# Patient Record
Sex: FEMALE | Race: WHITE | NOT HISPANIC OR LATINO | Employment: OTHER | URBAN - METROPOLITAN AREA
[De-identification: names, ages, dates, MRNs, and addresses within clinical notes are randomized per-mention and may not be internally consistent; named-entity substitution may affect disease eponyms.]

---

## 2017-02-14 ENCOUNTER — HOSPITAL ENCOUNTER (OUTPATIENT)
Dept: ORTHOPEDIC SURGERY | Facility: CLINIC | Age: 62
Discharge: HOME | End: 2017-02-14
Attending: PHYSICIAN ASSISTANT | Admitting: PHYSICIAN ASSISTANT

## 2017-06-07 ENCOUNTER — HOSPITAL ENCOUNTER (OUTPATIENT)
Dept: MRI IMAGING | Facility: HOSPITAL | Age: 62
Discharge: HOME | End: 2017-06-07
Attending: ORTHOPAEDIC SURGERY | Admitting: ORTHOPAEDIC SURGERY

## 2018-01-24 ENCOUNTER — HOSPITAL ENCOUNTER (OUTPATIENT)
Dept: CARDIOLOGY | Facility: HOSPITAL | Age: 63
Discharge: HOME | End: 2018-01-24
Attending: NURSE PRACTITIONER | Admitting: NURSE PRACTITIONER

## 2018-02-09 ENCOUNTER — HOSPITAL ENCOUNTER (OUTPATIENT)
Dept: ULTRASOUND IMAGING | Facility: HOSPITAL | Age: 63
Discharge: HOME | End: 2018-02-09
Attending: NURSE PRACTITIONER | Admitting: NURSE PRACTITIONER

## 2018-04-12 ENCOUNTER — OFFICE (AMBULATORY)
Dept: URBAN - METROPOLITAN AREA CLINIC 64 | Facility: CLINIC | Age: 63
End: 2018-04-12

## 2018-04-12 VITALS
HEIGHT: 60 IN | SYSTOLIC BLOOD PRESSURE: 108 MMHG | HEART RATE: 88 BPM | WEIGHT: 165 LBS | DIASTOLIC BLOOD PRESSURE: 67 MMHG

## 2018-04-12 DIAGNOSIS — R19.7 DIARRHEA, UNSPECIFIED: ICD-10-CM

## 2018-04-12 DIAGNOSIS — K21.9 GASTRO-ESOPHAGEAL REFLUX DISEASE WITHOUT ESOPHAGITIS: ICD-10-CM

## 2018-04-12 DIAGNOSIS — R13.10 DYSPHAGIA, UNSPECIFIED: ICD-10-CM

## 2018-04-12 PROCEDURE — 99203 OFFICE O/P NEW LOW 30 MIN: CPT | Performed by: NURSE PRACTITIONER

## 2018-04-12 RX ORDER — COLESTIPOL HYDROCHLORIDE 1 G/1
2 TABLET, FILM COATED ORAL
Qty: 60 | Refills: 11 | Status: COMPLETED
Start: 2018-04-12 | End: 2018-08-22

## 2018-04-26 ENCOUNTER — ON CAMPUS - OUTPATIENT (AMBULATORY)
Dept: URBAN - METROPOLITAN AREA HOSPITAL 85 | Facility: HOSPITAL | Age: 63
End: 2018-04-26

## 2018-04-26 ENCOUNTER — HOSPITAL ENCOUNTER (OUTPATIENT)
Dept: PREOP | Facility: HOSPITAL | Age: 63
Setting detail: HOSPITAL OUTPATIENT SURGERY
Discharge: HOME | End: 2018-04-26
Attending: INTERNAL MEDICINE | Admitting: INTERNAL MEDICINE

## 2018-04-26 DIAGNOSIS — K22.2 ESOPHAGEAL OBSTRUCTION: ICD-10-CM

## 2018-04-26 DIAGNOSIS — R13.10 DYSPHAGIA, UNSPECIFIED: ICD-10-CM

## 2018-04-26 DIAGNOSIS — K21.9 GASTRO-ESOPHAGEAL REFLUX DISEASE WITHOUT ESOPHAGITIS: ICD-10-CM

## 2018-04-26 LAB — GLUCOSE BLD-MCNC: 122 MG/DL (ref 70–105)

## 2018-04-26 PROCEDURE — 43450 DILATE ESOPHAGUS 1/MULT PASS: CPT | Performed by: INTERNAL MEDICINE

## 2018-04-26 PROCEDURE — 43239 EGD BIOPSY SINGLE/MULTIPLE: CPT | Performed by: INTERNAL MEDICINE

## 2018-08-22 ENCOUNTER — OFFICE (AMBULATORY)
Dept: URBAN - METROPOLITAN AREA CLINIC 64 | Facility: CLINIC | Age: 63
End: 2018-08-22

## 2018-08-22 VITALS
WEIGHT: 165 LBS | HEART RATE: 71 BPM | SYSTOLIC BLOOD PRESSURE: 115 MMHG | DIASTOLIC BLOOD PRESSURE: 72 MMHG | HEIGHT: 60 IN

## 2018-08-22 DIAGNOSIS — R10.32 LEFT LOWER QUADRANT PAIN: ICD-10-CM

## 2018-08-22 DIAGNOSIS — R19.7 DIARRHEA, UNSPECIFIED: ICD-10-CM

## 2018-08-22 PROCEDURE — 99213 OFFICE O/P EST LOW 20 MIN: CPT | Performed by: NURSE PRACTITIONER

## 2018-08-22 RX ORDER — DICYCLOMINE HYDROCHLORIDE 10 MG/1
40 CAPSULE ORAL
Qty: 60 | Refills: 11 | Status: COMPLETED
Start: 2018-08-22 | End: 2020-07-10

## 2018-08-27 ENCOUNTER — HOSPITAL ENCOUNTER (OUTPATIENT)
Dept: CT IMAGING | Facility: HOSPITAL | Age: 63
Discharge: HOME | End: 2018-08-27
Attending: NURSE PRACTITIONER | Admitting: NURSE PRACTITIONER

## 2018-08-27 LAB — CREAT BLDA-MCNC: 1.2 MG/DL (ref 0.6–1.3)

## 2019-02-21 ENCOUNTER — HOSPITAL ENCOUNTER (OUTPATIENT)
Dept: PHYSICAL THERAPY | Facility: HOSPITAL | Age: 64
Setting detail: RECURRING SERIES
Discharge: HOME | End: 2019-05-24
Attending: NURSE PRACTITIONER | Admitting: NURSE PRACTITIONER

## 2019-02-27 ENCOUNTER — HOSPITAL ENCOUNTER (OUTPATIENT)
Dept: ORTHOPEDIC SURGERY | Facility: CLINIC | Age: 64
Discharge: HOME | End: 2019-02-27
Attending: ORTHOPAEDIC SURGERY | Admitting: ORTHOPAEDIC SURGERY

## 2019-03-04 ENCOUNTER — HOSPITAL ENCOUNTER (OUTPATIENT)
Dept: ORTHOPEDIC SURGERY | Facility: CLINIC | Age: 64
Discharge: HOME | End: 2019-03-04
Attending: PHYSICIAN ASSISTANT | Admitting: PHYSICIAN ASSISTANT

## 2019-04-04 ENCOUNTER — HOSPITAL ENCOUNTER (OUTPATIENT)
Dept: OTHER | Facility: HOSPITAL | Age: 64
Discharge: HOME | End: 2019-04-04
Attending: NEUROLOGICAL SURGERY | Admitting: NEUROLOGICAL SURGERY

## 2019-09-19 ENCOUNTER — OFFICE VISIT (OUTPATIENT)
Dept: NEUROSURGERY | Facility: CLINIC | Age: 64
End: 2019-09-19

## 2019-09-19 VITALS
HEART RATE: 71 BPM | OXYGEN SATURATION: 99 % | DIASTOLIC BLOOD PRESSURE: 77 MMHG | SYSTOLIC BLOOD PRESSURE: 121 MMHG | HEIGHT: 60 IN | WEIGHT: 148 LBS | BODY MASS INDEX: 29.06 KG/M2

## 2019-09-19 DIAGNOSIS — M47.22 CERVICAL SPONDYLOSIS WITH MYELOPATHY AND RADICULOPATHY: Primary | ICD-10-CM

## 2019-09-19 DIAGNOSIS — M47.12 CERVICAL SPONDYLOSIS WITH MYELOPATHY AND RADICULOPATHY: Primary | ICD-10-CM

## 2019-09-19 PROCEDURE — 99213 OFFICE O/P EST LOW 20 MIN: CPT | Performed by: NEUROLOGICAL SURGERY

## 2019-09-19 RX ORDER — GABAPENTIN 600 MG/1
600 TABLET ORAL 2 TIMES DAILY
Refills: 0 | COMMUNITY
Start: 2019-09-09 | End: 2022-12-29 | Stop reason: SDUPTHER

## 2019-09-19 RX ORDER — METOPROLOL SUCCINATE 100 MG/1
100 TABLET, EXTENDED RELEASE ORAL 2 TIMES DAILY
COMMUNITY
Start: 2015-10-02 | End: 2023-03-24

## 2019-09-19 RX ORDER — HYDROXYZINE 50 MG/1
TABLET, FILM COATED ORAL
COMMUNITY
Start: 2019-02-27 | End: 2020-08-11

## 2019-09-19 RX ORDER — FENOFIBRATE 160 MG/1
160 TABLET ORAL EVERY EVENING
COMMUNITY
End: 2022-10-18 | Stop reason: SDDI

## 2019-09-19 RX ORDER — GLIPIZIDE 5 MG/1
5 TABLET ORAL
COMMUNITY
Start: 2015-09-30 | End: 2022-10-03

## 2019-09-19 RX ORDER — CLONIDINE HYDROCHLORIDE 0.2 MG/1
0.2 TABLET ORAL NIGHTLY
COMMUNITY
Start: 2015-10-02 | End: 2022-09-22

## 2019-09-19 RX ORDER — HYDRALAZINE HYDROCHLORIDE 50 MG/1
50 TABLET, FILM COATED ORAL 3 TIMES DAILY
COMMUNITY
Start: 2015-10-02 | End: 2022-09-22

## 2019-09-19 RX ORDER — ICOSAPENT ETHYL 1000 MG/1
1 CAPSULE ORAL DAILY
COMMUNITY
Start: 2016-09-30 | End: 2022-10-18 | Stop reason: SDDI

## 2019-09-19 RX ORDER — PRAVASTATIN SODIUM 20 MG
20 TABLET ORAL NIGHTLY
COMMUNITY
Start: 2019-02-27 | End: 2021-08-11

## 2019-09-19 RX ORDER — FUROSEMIDE 40 MG/1
40 TABLET ORAL EVERY MORNING
COMMUNITY
Start: 2015-09-30

## 2019-09-19 RX ORDER — DULOXETIN HYDROCHLORIDE 30 MG/1
30 CAPSULE, DELAYED RELEASE ORAL EVERY MORNING
COMMUNITY
End: 2020-10-15

## 2019-09-19 RX ORDER — SPIRONOLACTONE 50 MG/1
50 TABLET, FILM COATED ORAL EVERY MORNING
COMMUNITY
Start: 2015-09-30 | End: 2022-09-22

## 2019-09-19 NOTE — PROGRESS NOTES
"Subjective   Odalis Solo is a 64 y.o. female.     Chief Complaint   Patient presents with   • Neck Pain     follow up after Pain Management injections and ablation, continues to have radicular pain and paresthesias to the left upper extremity     Visit Vitals  /77 (BP Location: Left arm, Patient Position: Sitting, Cuff Size: Adult)   Pulse 71   Ht 152.4 cm (60\")   Wt 67.1 kg (148 lb)   SpO2 99%   BMI 28.90 kg/m²       History of Present Illness: Mrs. Solo is here today for follow-up after undergoing a series of epidural injections.  She states that the injections might of helped her neck pain for just a few days.  She is still experiencing worsening left arm pain and now right arm pain.  She has numbness in the hands feels like she is dropping things more readily.  She states her arms are feeling more fatigued when she tries to use them too much.  She denies any gait dysfunction or urinary urgency or incontinence.  She was recently prescribed Cymbalta but she has not started taking this yet.    The following portions of the patient's history were reviewed and updated as appropriate: allergies, current medications, past family history, past medical history, past social history, past surgical history and problem list.    Review of Systems      Past Surgical History:   Procedure Laterality Date   • APPENDECTOMY     • BRAIN SURGERY      x7   • CARPAL TUNNEL RELEASE     •  SECTION     • CHOLECYSTECTOMY OPEN     • HIP SURGERY     • SHOULDER ARTHROSCOPY         Past Medical History:   Diagnosis Date   • Diabetes mellitus (CMS/[facility])    • Hyperlipidemia    • Hypertension    • Low back pain    • TIA (transient ischemic attack)        Objective   Physical Exam  Neurologic Exam  Ortho Exam    Bilateral weakness deltoid biceps and hand intrinsics of 4/5  Positive Spurling sign  Positive intermittent  Decreased sensation in a patchy distribution along the C5-6 distribution  No overt upper motor neuron " signs    Assessment and Plan: It is my feeling that Mrs. 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  I reviewed again the MRI of the cervical flexion-extension films.  She has slight motion at the C4-5 level on flexion-extension.  She has relatively severe bilateral foraminal stenosis at C4-5 and central canal stenosis of approximately 7 mm.  This is also prevalent at the C5-6.  This does correlate with her radicular signs and my examination last time.  At this time I feel she is failed conservative measures and with her complaints progressive weakness in the hands I feel it more prudent to proceed ahead with surgical intervention.  I proposed her today C4-6 intracervical discectomy and arthrodesis.  I did discuss with her all the risk being bleeding, death, paralysis, infection, CSF leak, stroke, they were the procedure need for the procedures, injury to the current laryngeal nerve or to the esophagus.  She indicates she understands wished to proceed.  She watch the video of the procedure today and I answered all the questions to the best of my ability and her satisfaction.      Problems Addressed this Visit     None

## 2019-09-23 ENCOUNTER — PREP FOR SURGERY (OUTPATIENT)
Dept: OTHER | Facility: HOSPITAL | Age: 64
End: 2019-09-23

## 2019-09-23 DIAGNOSIS — M47.22 CERVICAL SPONDYLOSIS WITH MYELOPATHY AND RADICULOPATHY: Primary | ICD-10-CM

## 2019-09-23 DIAGNOSIS — M47.12 CERVICAL SPONDYLOSIS WITH MYELOPATHY AND RADICULOPATHY: Primary | ICD-10-CM

## 2019-09-23 DIAGNOSIS — Z51.89 ENCOUNTER FOR OTHER SPECIFIED AFTERCARE: ICD-10-CM

## 2019-09-23 RX ORDER — CEFAZOLIN SODIUM IN 0.9 % NACL 3 G/100 ML
3 INTRAVENOUS SOLUTION, PIGGYBACK (ML) INTRAVENOUS ONCE
Status: CANCELLED | OUTPATIENT
Start: 2019-09-23 | End: 2019-09-23

## 2019-09-23 RX ORDER — SODIUM CHLORIDE 0.9 % (FLUSH) 0.9 %
3 SYRINGE (ML) INJECTION EVERY 12 HOURS SCHEDULED
Status: CANCELLED | OUTPATIENT
Start: 2019-09-23

## 2019-09-23 RX ORDER — SODIUM CHLORIDE 0.9 % (FLUSH) 0.9 %
3-10 SYRINGE (ML) INJECTION AS NEEDED
Status: CANCELLED | OUTPATIENT
Start: 2019-09-23

## 2019-11-12 ENCOUNTER — HOSPITAL ENCOUNTER (OUTPATIENT)
Dept: GENERAL RADIOLOGY | Facility: HOSPITAL | Age: 64
Discharge: HOME | End: 2019-11-12
Admitting: PHYSICIAN ASSISTANT

## 2019-11-12 ENCOUNTER — APPOINTMENT (OUTPATIENT)
Dept: PREADMISSION TESTING | Facility: HOSPITAL | Age: 64
End: 2019-11-12

## 2019-11-12 VITALS
HEART RATE: 66 BPM | RESPIRATION RATE: 14 BRPM | DIASTOLIC BLOOD PRESSURE: 73 MMHG | WEIGHT: 149.4 LBS | SYSTOLIC BLOOD PRESSURE: 107 MMHG | OXYGEN SATURATION: 96 % | BODY MASS INDEX: 29.33 KG/M2 | HEIGHT: 60 IN | TEMPERATURE: 98.2 F

## 2019-11-12 DIAGNOSIS — M47.22 CERVICAL SPONDYLOSIS WITH MYELOPATHY AND RADICULOPATHY: ICD-10-CM

## 2019-11-12 DIAGNOSIS — Z51.89 ENCOUNTER FOR OTHER SPECIFIED AFTERCARE: ICD-10-CM

## 2019-11-12 DIAGNOSIS — M47.12 CERVICAL SPONDYLOSIS WITH MYELOPATHY AND RADICULOPATHY: ICD-10-CM

## 2019-11-12 LAB
ABO GROUP BLD: NORMAL
ALBUMIN SERPL-MCNC: 4.7 G/DL (ref 3.5–5.2)
ALBUMIN/GLOB SERPL: 1.7 G/DL
ALP SERPL-CCNC: 63 U/L (ref 39–117)
ALT SERPL W P-5'-P-CCNC: 17 U/L (ref 1–33)
ANION GAP SERPL CALCULATED.3IONS-SCNC: 12 MMOL/L (ref 5–15)
APTT PPP: 24.9 SECONDS (ref 24–31)
AST SERPL-CCNC: 17 U/L (ref 1–32)
BASOPHILS # BLD AUTO: 0 10*3/MM3 (ref 0–0.2)
BASOPHILS NFR BLD AUTO: 0.7 % (ref 0–1.5)
BILIRUB SERPL-MCNC: 0.3 MG/DL (ref 0.2–1.2)
BILIRUB UR QL STRIP: NEGATIVE
BLD GP AB SCN SERPL QL: NEGATIVE
BUN BLD-MCNC: 22 MG/DL (ref 8–23)
BUN/CREAT SERPL: 17.7 (ref 7–25)
CALCIUM SPEC-SCNC: 10.2 MG/DL (ref 8.6–10.5)
CHLORIDE SERPL-SCNC: 101 MMOL/L (ref 98–107)
CLARITY UR: CLEAR
CO2 SERPL-SCNC: 27 MMOL/L (ref 22–29)
COLOR UR: YELLOW
CREAT BLD-MCNC: 1.24 MG/DL (ref 0.57–1)
DEPRECATED RDW RBC AUTO: 42.4 FL (ref 37–54)
EOSINOPHIL # BLD AUTO: 0.1 10*3/MM3 (ref 0–0.4)
EOSINOPHIL NFR BLD AUTO: 1.6 % (ref 0.3–6.2)
ERYTHROCYTE [DISTWIDTH] IN BLOOD BY AUTOMATED COUNT: 13.3 % (ref 12.3–15.4)
GFR SERPL CREATININE-BSD FRML MDRD: 44 ML/MIN/1.73
GLOBULIN UR ELPH-MCNC: 2.7 GM/DL
GLUCOSE BLD-MCNC: 115 MG/DL (ref 65–99)
GLUCOSE UR STRIP-MCNC: NEGATIVE MG/DL
HCT VFR BLD AUTO: 37 % (ref 34–46.6)
HGB BLD-MCNC: 12.4 G/DL (ref 12–15.9)
HGB UR QL STRIP.AUTO: NEGATIVE
INR PPP: 0.97 (ref 0.9–1.1)
KETONES UR QL STRIP: NEGATIVE
LEUKOCYTE ESTERASE UR QL STRIP.AUTO: NEGATIVE
LYMPHOCYTES # BLD AUTO: 1.5 10*3/MM3 (ref 0.7–3.1)
LYMPHOCYTES NFR BLD AUTO: 25.5 % (ref 19.6–45.3)
MCH RBC QN AUTO: 30.3 PG (ref 26.6–33)
MCHC RBC AUTO-ENTMCNC: 33.6 G/DL (ref 31.5–35.7)
MCV RBC AUTO: 90.2 FL (ref 79–97)
MONOCYTES # BLD AUTO: 0.5 10*3/MM3 (ref 0.1–0.9)
MONOCYTES NFR BLD AUTO: 9.1 % (ref 5–12)
NEUTROPHILS # BLD AUTO: 3.7 10*3/MM3 (ref 1.7–7)
NEUTROPHILS NFR BLD AUTO: 63.1 % (ref 42.7–76)
NITRITE UR QL STRIP: NEGATIVE
NRBC BLD AUTO-RTO: 0.1 /100 WBC (ref 0–0.2)
PH UR STRIP.AUTO: 6 [PH] (ref 5–8)
PLATELET # BLD AUTO: 280 10*3/MM3 (ref 140–450)
PMV BLD AUTO: 9.2 FL (ref 6–12)
POTASSIUM BLD-SCNC: 4.3 MMOL/L (ref 3.5–5.2)
PROT SERPL-MCNC: 7.4 G/DL (ref 6–8.5)
PROT UR QL STRIP: NEGATIVE
PROTHROMBIN TIME: 10.2 SECONDS (ref 9.6–11.7)
RBC # BLD AUTO: 4.1 10*6/MM3 (ref 3.77–5.28)
RH BLD: POSITIVE
SODIUM BLD-SCNC: 140 MMOL/L (ref 136–145)
SP GR UR STRIP: 1.01 (ref 1–1.03)
T&S EXPIRATION DATE: NORMAL
UROBILINOGEN UR QL STRIP: NORMAL
WBC NRBC COR # BLD: 5.9 10*3/MM3 (ref 3.4–10.8)

## 2019-11-12 PROCEDURE — 93010 ELECTROCARDIOGRAM REPORT: CPT | Performed by: INTERNAL MEDICINE

## 2019-11-12 PROCEDURE — 85610 PROTHROMBIN TIME: CPT | Performed by: PHYSICIAN ASSISTANT

## 2019-11-12 PROCEDURE — 85730 THROMBOPLASTIN TIME PARTIAL: CPT | Performed by: PHYSICIAN ASSISTANT

## 2019-11-12 PROCEDURE — 86900 BLOOD TYPING SEROLOGIC ABO: CPT | Performed by: PHYSICIAN ASSISTANT

## 2019-11-12 PROCEDURE — 85025 COMPLETE CBC W/AUTO DIFF WBC: CPT | Performed by: PHYSICIAN ASSISTANT

## 2019-11-12 PROCEDURE — 86901 BLOOD TYPING SEROLOGIC RH(D): CPT | Performed by: PHYSICIAN ASSISTANT

## 2019-11-12 PROCEDURE — 80053 COMPREHEN METABOLIC PANEL: CPT | Performed by: PHYSICIAN ASSISTANT

## 2019-11-12 PROCEDURE — 93005 ELECTROCARDIOGRAM TRACING: CPT

## 2019-11-12 PROCEDURE — 86850 RBC ANTIBODY SCREEN: CPT | Performed by: PHYSICIAN ASSISTANT

## 2019-11-12 PROCEDURE — 36415 COLL VENOUS BLD VENIPUNCTURE: CPT

## 2019-11-12 PROCEDURE — 86900 BLOOD TYPING SEROLOGIC ABO: CPT

## 2019-11-12 PROCEDURE — 86901 BLOOD TYPING SEROLOGIC RH(D): CPT

## 2019-11-12 PROCEDURE — 71046 X-RAY EXAM CHEST 2 VIEWS: CPT

## 2019-11-12 PROCEDURE — 87081 CULTURE SCREEN ONLY: CPT | Performed by: PHYSICIAN ASSISTANT

## 2019-11-12 PROCEDURE — 81003 URINALYSIS AUTO W/O SCOPE: CPT | Performed by: PHYSICIAN ASSISTANT

## 2019-11-13 DIAGNOSIS — Z22.322 MRSA CARRIER: Primary | ICD-10-CM

## 2019-11-13 LAB — MRSA SPEC QL CULT: NORMAL

## 2019-11-13 RX ORDER — CHLORHEXIDINE GLUCONATE 4 G/100ML
SOLUTION TOPICAL
Qty: 118 ML | Refills: 0 | Status: SHIPPED | OUTPATIENT
Start: 2019-11-13 | End: 2019-12-05

## 2019-11-18 ENCOUNTER — ANESTHESIA EVENT (OUTPATIENT)
Dept: PERIOP | Facility: HOSPITAL | Age: 64
End: 2019-11-18

## 2019-11-19 ENCOUNTER — APPOINTMENT (OUTPATIENT)
Dept: GENERAL RADIOLOGY | Facility: HOSPITAL | Age: 64
End: 2019-11-19

## 2019-11-19 ENCOUNTER — HOSPITAL ENCOUNTER (INPATIENT)
Facility: HOSPITAL | Age: 64
LOS: 1 days | Discharge: HOME | End: 2019-11-20
Attending: NEUROLOGICAL SURGERY | Admitting: NEUROLOGICAL SURGERY

## 2019-11-19 ENCOUNTER — ANESTHESIA (OUTPATIENT)
Dept: PERIOP | Facility: HOSPITAL | Age: 64
End: 2019-11-19

## 2019-11-19 DIAGNOSIS — M47.22 CERVICAL SPONDYLOSIS WITH MYELOPATHY AND RADICULOPATHY: ICD-10-CM

## 2019-11-19 DIAGNOSIS — M47.12 CERVICAL SPONDYLOSIS WITH MYELOPATHY AND RADICULOPATHY: ICD-10-CM

## 2019-11-19 DIAGNOSIS — Z22.322 MRSA CARRIER: ICD-10-CM

## 2019-11-19 LAB
GLUCOSE BLDC GLUCOMTR-MCNC: 120 MG/DL (ref 70–105)
GLUCOSE BLDC GLUCOMTR-MCNC: 148 MG/DL (ref 70–105)
GLUCOSE BLDC GLUCOMTR-MCNC: 94 MG/DL (ref 70–105)

## 2019-11-19 PROCEDURE — 4A11X4G MONITORING OF PERIPHERAL NERVOUS ELECTRICAL ACTIVITY, INTRAOPERATIVE, EXTERNAL APPROACH: ICD-10-PCS | Performed by: NEUROLOGICAL SURGERY

## 2019-11-19 PROCEDURE — 00NW0ZZ RELEASE CERVICAL SPINAL CORD, OPEN APPROACH: ICD-10-PCS | Performed by: NEUROLOGICAL SURGERY

## 2019-11-19 PROCEDURE — 22845 INSERT SPINE FIXATION DEVICE: CPT | Performed by: NEUROLOGICAL SURGERY

## 2019-11-19 PROCEDURE — 22552 ARTHRD ANT NTRBD CERVICAL EA: CPT | Performed by: NEUROLOGICAL SURGERY

## 2019-11-19 PROCEDURE — 25010000002 FENTANYL CITRATE (PF) 100 MCG/2ML SOLUTION: Performed by: ANESTHESIOLOGIST ASSISTANT

## 2019-11-19 PROCEDURE — 25010000002 DEXAMETHASONE PER 1 MG: Performed by: ANESTHESIOLOGIST ASSISTANT

## 2019-11-19 PROCEDURE — 25010000002 CEFAZOLIN PER 500 MG: Performed by: NEUROLOGICAL SURGERY

## 2019-11-19 PROCEDURE — 25010000002 MIDAZOLAM PER 1 MG: Performed by: ANESTHESIOLOGIST ASSISTANT

## 2019-11-19 PROCEDURE — 25010000002 PHENYLEPHRINE PER 1 ML: Performed by: ANESTHESIOLOGIST ASSISTANT

## 2019-11-19 PROCEDURE — 25010000002 ONDANSETRON PER 1 MG: Performed by: ANESTHESIOLOGIST ASSISTANT

## 2019-11-19 PROCEDURE — 72020 X-RAY EXAM OF SPINE 1 VIEW: CPT

## 2019-11-19 PROCEDURE — 25010000002 PROPOFOL 200 MG/20ML EMULSION: Performed by: ANESTHESIOLOGIST ASSISTANT

## 2019-11-19 PROCEDURE — C1713 ANCHOR/SCREW BN/BN,TIS/BN: HCPCS | Performed by: NEUROLOGICAL SURGERY

## 2019-11-19 PROCEDURE — 0RB30ZZ EXCISION OF CERVICAL VERTEBRAL DISC, OPEN APPROACH: ICD-10-PCS | Performed by: NEUROLOGICAL SURGERY

## 2019-11-19 PROCEDURE — 82962 GLUCOSE BLOOD TEST: CPT

## 2019-11-19 PROCEDURE — 25010000002 MORPHINE PER 10 MG: Performed by: ANESTHESIOLOGIST ASSISTANT

## 2019-11-19 PROCEDURE — 25010000002 KETOROLAC TROMETHAMINE PER 15 MG: Performed by: ANESTHESIOLOGIST ASSISTANT

## 2019-11-19 PROCEDURE — 22853 INSJ BIOMECHANICAL DEVICE: CPT | Performed by: NEUROLOGICAL SURGERY

## 2019-11-19 PROCEDURE — 01N10ZZ RELEASE CERVICAL NERVE, OPEN APPROACH: ICD-10-PCS | Performed by: NEUROLOGICAL SURGERY

## 2019-11-19 PROCEDURE — 22551 ARTHRD ANT NTRBDY CERVICAL: CPT | Performed by: NEUROLOGICAL SURGERY

## 2019-11-19 PROCEDURE — 0RG20A0 FUSION OF 2 OR MORE CERVICAL VERTEBRAL JOINTS WITH INTERBODY FUSION DEVICE, ANTERIOR APPROACH, ANTERIOR COLUMN, OPEN APPROACH: ICD-10-PCS | Performed by: NEUROLOGICAL SURGERY

## 2019-11-19 DEVICE — IMPLANTABLE DEVICE: Type: IMPLANTABLE DEVICE | Site: SPINE CERVICAL | Status: FUNCTIONAL

## 2019-11-19 DEVICE — ALLOGRFT SPNG OSTEOAMP COMPRESSIBLE SM TALL 10X10X16MM: Type: IMPLANTABLE DEVICE | Site: SPINE CERVICAL | Status: FUNCTIONAL

## 2019-11-19 RX ORDER — HYDROCODONE BITARTRATE AND ACETAMINOPHEN 7.5; 325 MG/1; MG/1
1 TABLET ORAL EVERY 4 HOURS PRN
Status: DISCONTINUED | OUTPATIENT
Start: 2019-11-19 | End: 2019-11-20 | Stop reason: HOSPADM

## 2019-11-19 RX ORDER — HYDROXYZINE HYDROCHLORIDE 25 MG/1
50 TABLET, FILM COATED ORAL NIGHTLY PRN
Status: DISCONTINUED | OUTPATIENT
Start: 2019-11-19 | End: 2019-11-20 | Stop reason: HOSPADM

## 2019-11-19 RX ORDER — HYDRALAZINE HYDROCHLORIDE 25 MG/1
50 TABLET, FILM COATED ORAL EVERY 8 HOURS SCHEDULED
Status: DISCONTINUED | OUTPATIENT
Start: 2019-11-19 | End: 2019-11-20 | Stop reason: HOSPADM

## 2019-11-19 RX ORDER — GABAPENTIN 300 MG/1
600 CAPSULE ORAL EVERY 8 HOURS SCHEDULED
Status: DISCONTINUED | OUTPATIENT
Start: 2019-11-19 | End: 2019-11-20 | Stop reason: HOSPADM

## 2019-11-19 RX ORDER — DEXAMETHASONE SODIUM PHOSPHATE 4 MG/ML
INJECTION, SOLUTION INTRA-ARTICULAR; INTRALESIONAL; INTRAMUSCULAR; INTRAVENOUS; SOFT TISSUE AS NEEDED
Status: DISCONTINUED | OUTPATIENT
Start: 2019-11-19 | End: 2019-11-19 | Stop reason: SURG

## 2019-11-19 RX ORDER — LIDOCAINE HYDROCHLORIDE 10 MG/ML
INJECTION, SOLUTION EPIDURAL; INFILTRATION; INTRACAUDAL; PERINEURAL AS NEEDED
Status: DISCONTINUED | OUTPATIENT
Start: 2019-11-19 | End: 2019-11-19 | Stop reason: SURG

## 2019-11-19 RX ORDER — DOCUSATE SODIUM 100 MG/1
100 CAPSULE, LIQUID FILLED ORAL 2 TIMES DAILY PRN
Status: DISCONTINUED | OUTPATIENT
Start: 2019-11-19 | End: 2019-11-20 | Stop reason: HOSPADM

## 2019-11-19 RX ORDER — PROMETHAZINE HYDROCHLORIDE 25 MG/1
25 TABLET ORAL ONCE AS NEEDED
Status: DISCONTINUED | OUTPATIENT
Start: 2019-11-19 | End: 2019-11-19 | Stop reason: HOSPADM

## 2019-11-19 RX ORDER — SODIUM CHLORIDE 0.9 % (FLUSH) 0.9 %
3 SYRINGE (ML) INJECTION EVERY 12 HOURS SCHEDULED
Status: DISCONTINUED | OUTPATIENT
Start: 2019-11-19 | End: 2019-11-19 | Stop reason: HOSPADM

## 2019-11-19 RX ORDER — MORPHINE SULFATE 4 MG/ML
4 INJECTION, SOLUTION INTRAMUSCULAR; INTRAVENOUS
Status: DISCONTINUED | OUTPATIENT
Start: 2019-11-19 | End: 2019-11-19 | Stop reason: HOSPADM

## 2019-11-19 RX ORDER — MORPHINE SULFATE 4 MG/ML
1 INJECTION, SOLUTION INTRAMUSCULAR; INTRAVENOUS
Status: DISCONTINUED | OUTPATIENT
Start: 2019-11-19 | End: 2019-11-19 | Stop reason: HOSPADM

## 2019-11-19 RX ORDER — PROMETHAZINE HYDROCHLORIDE 25 MG/ML
6.25 INJECTION, SOLUTION INTRAMUSCULAR; INTRAVENOUS ONCE AS NEEDED
Status: DISCONTINUED | OUTPATIENT
Start: 2019-11-19 | End: 2019-11-19 | Stop reason: HOSPADM

## 2019-11-19 RX ORDER — SODIUM CHLORIDE 0.9 % (FLUSH) 0.9 %
3 SYRINGE (ML) INJECTION EVERY 12 HOURS SCHEDULED
Status: DISCONTINUED | OUTPATIENT
Start: 2019-11-19 | End: 2019-11-20 | Stop reason: HOSPADM

## 2019-11-19 RX ORDER — DIPHENHYDRAMINE HCL 25 MG
25 TABLET ORAL NIGHTLY PRN
Status: DISCONTINUED | OUTPATIENT
Start: 2019-11-19 | End: 2019-11-20 | Stop reason: HOSPADM

## 2019-11-19 RX ORDER — METOPROLOL SUCCINATE 50 MG/1
100 TABLET, EXTENDED RELEASE ORAL
Status: DISCONTINUED | OUTPATIENT
Start: 2019-11-20 | End: 2019-11-20 | Stop reason: HOSPADM

## 2019-11-19 RX ORDER — CEFAZOLIN SODIUM IN 0.9 % NACL 3 G/100 ML
3 INTRAVENOUS SOLUTION, PIGGYBACK (ML) INTRAVENOUS ONCE
Status: DISCONTINUED | OUTPATIENT
Start: 2019-11-19 | End: 2019-11-19

## 2019-11-19 RX ORDER — ONDANSETRON 2 MG/ML
4 INJECTION INTRAMUSCULAR; INTRAVENOUS ONCE AS NEEDED
Status: DISCONTINUED | OUTPATIENT
Start: 2019-11-19 | End: 2019-11-19 | Stop reason: HOSPADM

## 2019-11-19 RX ORDER — GLIPIZIDE 5 MG/1
5 TABLET ORAL
Status: DISCONTINUED | OUTPATIENT
Start: 2019-11-19 | End: 2019-11-20 | Stop reason: HOSPADM

## 2019-11-19 RX ORDER — ACETAMINOPHEN 325 MG/1
650 TABLET ORAL EVERY 4 HOURS PRN
Status: DISCONTINUED | OUTPATIENT
Start: 2019-11-19 | End: 2019-11-20 | Stop reason: HOSPADM

## 2019-11-19 RX ORDER — FENTANYL CITRATE 50 UG/ML
50 INJECTION, SOLUTION INTRAMUSCULAR; INTRAVENOUS
Status: DISCONTINUED | OUTPATIENT
Start: 2019-11-19 | End: 2019-11-19 | Stop reason: HOSPADM

## 2019-11-19 RX ORDER — BISACODYL 5 MG/1
5 TABLET, DELAYED RELEASE ORAL DAILY PRN
Status: DISCONTINUED | OUTPATIENT
Start: 2019-11-19 | End: 2019-11-20 | Stop reason: HOSPADM

## 2019-11-19 RX ORDER — NALOXONE HCL 0.4 MG/ML
0.4 VIAL (ML) INJECTION AS NEEDED
Status: DISCONTINUED | OUTPATIENT
Start: 2019-11-19 | End: 2019-11-19 | Stop reason: HOSPADM

## 2019-11-19 RX ORDER — ONDANSETRON 4 MG/1
4 TABLET, FILM COATED ORAL EVERY 6 HOURS PRN
Status: DISCONTINUED | OUTPATIENT
Start: 2019-11-19 | End: 2019-11-20 | Stop reason: HOSPADM

## 2019-11-19 RX ORDER — FUROSEMIDE 40 MG/1
40 TABLET ORAL DAILY
Status: DISCONTINUED | OUTPATIENT
Start: 2019-11-19 | End: 2019-11-20 | Stop reason: HOSPADM

## 2019-11-19 RX ORDER — ROCURONIUM BROMIDE 10 MG/ML
INJECTION, SOLUTION INTRAVENOUS AS NEEDED
Status: DISCONTINUED | OUTPATIENT
Start: 2019-11-19 | End: 2019-11-19 | Stop reason: SURG

## 2019-11-19 RX ORDER — FLUMAZENIL 0.1 MG/ML
0.2 INJECTION INTRAVENOUS AS NEEDED
Status: DISCONTINUED | OUTPATIENT
Start: 2019-11-19 | End: 2019-11-19 | Stop reason: HOSPADM

## 2019-11-19 RX ORDER — EPHEDRINE SULFATE 50 MG/ML
INJECTION INTRAVENOUS AS NEEDED
Status: DISCONTINUED | OUTPATIENT
Start: 2019-11-19 | End: 2019-11-19 | Stop reason: SURG

## 2019-11-19 RX ORDER — BISACODYL 10 MG
10 SUPPOSITORY, RECTAL RECTAL DAILY PRN
Status: DISCONTINUED | OUTPATIENT
Start: 2019-11-19 | End: 2019-11-20 | Stop reason: HOSPADM

## 2019-11-19 RX ORDER — KETOROLAC TROMETHAMINE 15 MG/ML
15 INJECTION, SOLUTION INTRAMUSCULAR; INTRAVENOUS EVERY 6 HOURS PRN
Status: COMPLETED | OUTPATIENT
Start: 2019-11-19 | End: 2019-11-19

## 2019-11-19 RX ORDER — DULOXETIN HYDROCHLORIDE 30 MG/1
30 CAPSULE, DELAYED RELEASE ORAL DAILY
Status: DISCONTINUED | OUTPATIENT
Start: 2019-11-20 | End: 2019-11-20 | Stop reason: HOSPADM

## 2019-11-19 RX ORDER — SODIUM CHLORIDE 9 MG/ML
9 INJECTION, SOLUTION INTRAVENOUS CONTINUOUS PRN
Status: DISCONTINUED | OUTPATIENT
Start: 2019-11-19 | End: 2019-11-20 | Stop reason: HOSPADM

## 2019-11-19 RX ORDER — CLONIDINE HYDROCHLORIDE 0.1 MG/1
0.2 TABLET ORAL EVERY 12 HOURS SCHEDULED
Status: DISCONTINUED | OUTPATIENT
Start: 2019-11-19 | End: 2019-11-20

## 2019-11-19 RX ORDER — PROPOFOL 10 MG/ML
INJECTION, EMULSION INTRAVENOUS AS NEEDED
Status: DISCONTINUED | OUTPATIENT
Start: 2019-11-19 | End: 2019-11-19 | Stop reason: SURG

## 2019-11-19 RX ORDER — HYDROMORPHONE HCL 110MG/55ML
1 PATIENT CONTROLLED ANALGESIA SYRINGE INTRAVENOUS EVERY 4 HOURS PRN
Status: DISCONTINUED | OUTPATIENT
Start: 2019-11-19 | End: 2019-11-20 | Stop reason: HOSPADM

## 2019-11-19 RX ORDER — ATORVASTATIN CALCIUM 10 MG/1
10 TABLET, FILM COATED ORAL DAILY
Status: DISCONTINUED | OUTPATIENT
Start: 2019-11-19 | End: 2019-11-20 | Stop reason: HOSPADM

## 2019-11-19 RX ORDER — MIDAZOLAM HYDROCHLORIDE 1 MG/ML
INJECTION INTRAMUSCULAR; INTRAVENOUS AS NEEDED
Status: DISCONTINUED | OUTPATIENT
Start: 2019-11-19 | End: 2019-11-19 | Stop reason: SURG

## 2019-11-19 RX ORDER — HYDRALAZINE HYDROCHLORIDE 20 MG/ML
5 INJECTION INTRAMUSCULAR; INTRAVENOUS
Status: DISCONTINUED | OUTPATIENT
Start: 2019-11-19 | End: 2019-11-19 | Stop reason: HOSPADM

## 2019-11-19 RX ORDER — FENTANYL CITRATE 50 UG/ML
INJECTION, SOLUTION INTRAMUSCULAR; INTRAVENOUS AS NEEDED
Status: DISCONTINUED | OUTPATIENT
Start: 2019-11-19 | End: 2019-11-19 | Stop reason: SURG

## 2019-11-19 RX ORDER — ONDANSETRON 2 MG/ML
4 INJECTION INTRAMUSCULAR; INTRAVENOUS EVERY 6 HOURS PRN
Status: DISCONTINUED | OUTPATIENT
Start: 2019-11-19 | End: 2019-11-20 | Stop reason: HOSPADM

## 2019-11-19 RX ORDER — IPRATROPIUM BROMIDE AND ALBUTEROL SULFATE 2.5; .5 MG/3ML; MG/3ML
3 SOLUTION RESPIRATORY (INHALATION) ONCE AS NEEDED
Status: DISCONTINUED | OUTPATIENT
Start: 2019-11-19 | End: 2019-11-19 | Stop reason: HOSPADM

## 2019-11-19 RX ORDER — SODIUM CHLORIDE 0.9 % (FLUSH) 0.9 %
10 SYRINGE (ML) INJECTION AS NEEDED
Status: DISCONTINUED | OUTPATIENT
Start: 2019-11-19 | End: 2019-11-20 | Stop reason: HOSPADM

## 2019-11-19 RX ORDER — NALOXONE HCL 0.4 MG/ML
0.4 VIAL (ML) INJECTION
Status: DISCONTINUED | OUTPATIENT
Start: 2019-11-19 | End: 2019-11-20 | Stop reason: HOSPADM

## 2019-11-19 RX ORDER — SPIRONOLACTONE 25 MG/1
50 TABLET ORAL DAILY
Status: DISCONTINUED | OUTPATIENT
Start: 2019-11-19 | End: 2019-11-20 | Stop reason: HOSPADM

## 2019-11-19 RX ORDER — LABETALOL HYDROCHLORIDE 5 MG/ML
5 INJECTION, SOLUTION INTRAVENOUS
Status: DISCONTINUED | OUTPATIENT
Start: 2019-11-19 | End: 2019-11-19 | Stop reason: HOSPADM

## 2019-11-19 RX ORDER — MORPHINE SULFATE 4 MG/ML
2 INJECTION, SOLUTION INTRAMUSCULAR; INTRAVENOUS
Status: DISCONTINUED | OUTPATIENT
Start: 2019-11-19 | End: 2019-11-19 | Stop reason: HOSPADM

## 2019-11-19 RX ORDER — OXYCODONE HYDROCHLORIDE 5 MG/1
7.5 TABLET ORAL ONCE AS NEEDED
Status: COMPLETED | OUTPATIENT
Start: 2019-11-19 | End: 2019-11-19

## 2019-11-19 RX ORDER — SODIUM CHLORIDE 0.9 % (FLUSH) 0.9 %
3-10 SYRINGE (ML) INJECTION AS NEEDED
Status: DISCONTINUED | OUTPATIENT
Start: 2019-11-19 | End: 2019-11-19 | Stop reason: HOSPADM

## 2019-11-19 RX ORDER — MEPERIDINE HYDROCHLORIDE 25 MG/ML
12.5 INJECTION INTRAMUSCULAR; INTRAVENOUS; SUBCUTANEOUS
Status: DISCONTINUED | OUTPATIENT
Start: 2019-11-19 | End: 2019-11-19 | Stop reason: HOSPADM

## 2019-11-19 RX ORDER — ONDANSETRON 2 MG/ML
INJECTION INTRAMUSCULAR; INTRAVENOUS AS NEEDED
Status: DISCONTINUED | OUTPATIENT
Start: 2019-11-19 | End: 2019-11-19 | Stop reason: SURG

## 2019-11-19 RX ORDER — FENTANYL CITRATE 50 UG/ML
25 INJECTION, SOLUTION INTRAMUSCULAR; INTRAVENOUS
Status: DISCONTINUED | OUTPATIENT
Start: 2019-11-19 | End: 2019-11-19 | Stop reason: HOSPADM

## 2019-11-19 RX ORDER — PROMETHAZINE HYDROCHLORIDE 25 MG/1
25 SUPPOSITORY RECTAL ONCE AS NEEDED
Status: DISCONTINUED | OUTPATIENT
Start: 2019-11-19 | End: 2019-11-19 | Stop reason: HOSPADM

## 2019-11-19 RX ADMIN — PROPOFOL 200 MG: 10 INJECTION, EMULSION INTRAVENOUS at 07:20

## 2019-11-19 RX ADMIN — CLONIDINE HYDROCHLORIDE 0.2 MG: 0.1 TABLET ORAL at 20:58

## 2019-11-19 RX ADMIN — HYDROCODONE BITARTRATE AND ACETAMINOPHEN 1 TABLET: 7.5; 325 TABLET ORAL at 14:35

## 2019-11-19 RX ADMIN — GABAPENTIN 600 MG: 300 CAPSULE ORAL at 21:00

## 2019-11-19 RX ADMIN — HYDRALAZINE HYDROCHLORIDE 50 MG: 25 TABLET, FILM COATED ORAL at 21:00

## 2019-11-19 RX ADMIN — EPHEDRINE SULFATE 5 MG: 50 INJECTION, SOLUTION INTRAVENOUS at 08:15

## 2019-11-19 RX ADMIN — EPHEDRINE SULFATE 5 MG: 50 INJECTION, SOLUTION INTRAVENOUS at 07:36

## 2019-11-19 RX ADMIN — MORPHINE SULFATE 1 MG: 4 INJECTION INTRAVENOUS at 10:40

## 2019-11-19 RX ADMIN — CEFAZOLIN SODIUM 2 G: 10 INJECTION, POWDER, FOR SOLUTION INTRAVENOUS at 20:58

## 2019-11-19 RX ADMIN — GLIPIZIDE 5 MG: 5 TABLET ORAL at 20:58

## 2019-11-19 RX ADMIN — OXYCODONE HYDROCHLORIDE 7.5 MG: 5 TABLET ORAL at 11:24

## 2019-11-19 RX ADMIN — MORPHINE SULFATE 1 MG: 4 INJECTION INTRAVENOUS at 09:43

## 2019-11-19 RX ADMIN — LIDOCAINE HYDROCHLORIDE 50 MG: 10 INJECTION, SOLUTION EPIDURAL; INFILTRATION; INTRACAUDAL; PERINEURAL at 07:20

## 2019-11-19 RX ADMIN — DEXAMETHASONE SODIUM PHOSPHATE 4 MG: 4 INJECTION, SOLUTION INTRAMUSCULAR; INTRAVENOUS at 07:37

## 2019-11-19 RX ADMIN — CEFAZOLIN SODIUM 2 G: 1 INJECTION, POWDER, FOR SOLUTION INTRAMUSCULAR; INTRAVENOUS at 07:25

## 2019-11-19 RX ADMIN — FENTANYL CITRATE 50 MCG: 50 INJECTION, SOLUTION INTRAMUSCULAR; INTRAVENOUS at 07:40

## 2019-11-19 RX ADMIN — SODIUM CHLORIDE: 0.9 INJECTION, SOLUTION INTRAVENOUS at 07:11

## 2019-11-19 RX ADMIN — PHENYLEPHRINE HYDROCHLORIDE 100 MCG: 10 INJECTION, SOLUTION INTRAMUSCULAR; INTRAVENOUS; SUBCUTANEOUS at 09:19

## 2019-11-19 RX ADMIN — FENTANYL CITRATE 50 MCG: 50 INJECTION, SOLUTION INTRAMUSCULAR; INTRAVENOUS at 07:14

## 2019-11-19 RX ADMIN — MORPHINE SULFATE 1 MG: 4 INJECTION INTRAVENOUS at 10:08

## 2019-11-19 RX ADMIN — FENTANYL CITRATE 25 MCG: 50 INJECTION, SOLUTION INTRAMUSCULAR; INTRAVENOUS at 09:11

## 2019-11-19 RX ADMIN — MIDAZOLAM 2 MG: 1 INJECTION INTRAMUSCULAR; INTRAVENOUS at 07:13

## 2019-11-19 RX ADMIN — Medication 3 ML: at 21:00

## 2019-11-19 RX ADMIN — KETOROLAC TROMETHAMINE 15 MG: 15 INJECTION, SOLUTION INTRAMUSCULAR; INTRAVENOUS at 09:23

## 2019-11-19 RX ADMIN — ACETAMINOPHEN 650 MG: 325 TABLET ORAL at 19:19

## 2019-11-19 RX ADMIN — HYDROXYZINE HYDROCHLORIDE 50 MG: 25 TABLET ORAL at 20:58

## 2019-11-19 RX ADMIN — ROCURONIUM BROMIDE 25 MG: 10 INJECTION, SOLUTION INTRAVENOUS at 07:20

## 2019-11-19 RX ADMIN — ONDANSETRON 4 MG: 2 INJECTION INTRAMUSCULAR; INTRAVENOUS at 08:54

## 2019-11-19 RX ADMIN — MORPHINE SULFATE 1 MG: 4 INJECTION INTRAVENOUS at 10:29

## 2019-11-19 NOTE — H&P
Patient Care Team:  Carol Meehan APRN as PCP - General (Family Medicine)    Chief complaint neck and arm pain    Subjective     Mrs Solo is a 63 yr old female that presents for follow up for Cervical spine pain and cervical radiculopathy to the left upper extremity.  She continues to have some numbness that extends to the left hand into the thumb.   Her symptoms continue and are starting to worsen.  The MRI demonstrates advanced spondylitic disease with loss of cervical lordosis.  Her worst levels of the C3-4 C4-5 and C5-6 levels.  At C4-5 there is bilateral neural foraminal  stenosis left greater than right.  She describes more but tingling sensation in the arm but mainly it is her neck pain.  She  does have cerebellar sag on the MRI I at the prior decompression but she demonstrates no symptoms cerebellar ptosis.  At this time I feel she suffers more from symptoms suggestive spondylosis with mild spondylitic radiculopathy.  At this time she cannot take   anti-inflammatories due to her kidney function.  She has mild stenosis but no overt cord compression.  She failed conservative measures including traction and rhizotomies. She is scheduled for a C4-6 ACDF  Review of Systems   Pertinent items are noted in HPI    History  Past Medical History:   Diagnosis Date   • Diabetes mellitus (CMS/HCC)    • Hyperlipidemia    • Hypertension    • Low back pain    • MRSA (methicillin resistant Staphylococcus aureus)     after brain surgery   • TIA (transient ischemic attack)      Past Surgical History:   Procedure Laterality Date   • APPENDECTOMY     • BRAIN SURGERY      x7   • CARPAL TUNNEL RELEASE     •  SECTION     • CHOLECYSTECTOMY OPEN     • HIP SURGERY     • HYSTERECTOMY     • JOINT REPLACEMENT Left     hip   • SHOULDER ARTHROSCOPY     • TONSILLECTOMY       Family History   Problem Relation Age of Onset   • Cancer Mother    • Heart disease Mother    • Hypertension Mother    • Hyperlipidemia Mother    •  Cancer Father    • Diabetes Sister    • Asthma Sister    • Leukemia Brother    • Hypertension Brother      Social History     Tobacco Use   • Smoking status: Former Smoker     Types: Cigarettes   • Smokeless tobacco: Never Used   Substance Use Topics   • Alcohol use: Yes     Comment: 2 times a month   • Drug use: Yes     Types: Hydrocodone     Facility-Administered Medications Prior to Admission   Medication Dose Route Frequency Provider Last Rate Last Dose   • mupirocin (BACTROBAN) 2 % ointment   Topical Q12H Lipscomb, Sierra, PA-C         Medications Prior to Admission   Medication Sig Dispense Refill Last Dose   • chlorhexidine (HIBICLENS) 4 % external liquid Wash BID x 2 days. Do NOT get into eyes or wash face. 118 mL 0 11/19/2019 at Unknown time   • Cholecalciferol (VITAMIN D3 PO) Take 2,000 Units by mouth Daily.   Past Month at Unknown time   • CloNIDine (CATAPRES) 0.2 MG tablet clonidine HCl 0.2 mg tablet  BID   11/18/2019 at Unknown time   • Dulaglutide (TRULICITY) 0.75 MG/0.5ML solution pen-injector Trulicity 0.75 mg/0.5 mL subcutaneous pen injector Weekly on Tuesday 11/18/2019 at Unknown time   • DULoxetine (CYMBALTA) 30 MG capsule Take 30 mg by mouth Daily.   11/19/2019 at Unknown time   • fenofibrate 160 MG tablet fenofibrate 160 mg tablet Daily   11/18/2019 at Unknown time   • furosemide (LASIX) 40 MG tablet furosemide 40 mg tablet Daily   11/18/2019 at Unknown time   • glipiZIDE (GLUCOTROL) 5 MG tablet glipizide 5 mg tablet  BID   11/18/2019 at Unknown time   • hydrOXYzine (ATARAX) 50 MG tablet hydroxyzine HCl 50 mg tablet @ HS   11/18/2019 at Unknown time   • icosapent ethyl (VASCEPA) 1 g capsule capsule VASCEPA CAPS   BID   11/18/2019 at Unknown time   • metoprolol succinate XL (TOPROL-XL) 100 MG 24 hr tablet METOPROLOL SUCCINATE  MG XR24H-TAB  BID   11/19/2019 at Unknown time   • pravastatin (PRAVACHOL) 20 MG tablet pravastatin 20 mg tablet @ HS   11/18/2019 at Unknown time   • spironolactone  (ALDACTONE) 50 MG tablet Take 50 mg by mouth Daily.   11/18/2019 at Unknown time   • Acetaminophen 500 MG capsule Take 500 mg by mouth Every 4 (Four) Hours As Needed.   Unknown at Unknown time   • gabapentin (NEURONTIN) 600 MG tablet Take 600 mg by mouth 3 (Three) Times a Day.  0 Taking   • hydrALAZINE (APRESOLINE) 50 MG tablet hydralazine 50 mg tablet  TID   Taking       Objective     Vital Signs  Temp:  [97.6 °F (36.4 °C)] 97.6 °F (36.4 °C)  Heart Rate:  [67] 67  Resp:  [12] 12  BP: (125)/(72) 125/72    Physical Exam:    General Appearance: alert, appears stated age and cooperative  Neck: no adenopathy, supple, trachea midline, no thyromegaly, no carotid bruit and no JVD  Neurologic: Mental Status orientated to person, place, time and situation, Sensory intact, Motor strength is equal in upper and lower extremities    Results Review:    I reviewed the patient's new clinical results.    Assessment/Plan       Cervical spondylosis with myelopathy and radiculopathy      C4-6 ACDF    I discussed the patients findings and my recommendations with patient, family and nursing staff.     Ian Foley MD  11/19/19  7:07 AM

## 2019-11-19 NOTE — ANESTHESIA PROCEDURE NOTES
Airway  Urgency: elective    Date/Time: 11/19/2019 7:23 AM  Airway not difficult    General Information and Staff    Patient location during procedure: OR  Anesthesiologist: Alfredo Workman MD  CRNA: Dinah Hickey AA    Indications and Patient Condition  Indications for airway management: airway protection    Preoxygenated: yes  MILS maintained throughout  Mask difficulty assessment: 2 - vent by mask + OA or adjuvant +/- NMBA    Final Airway Details  Final airway type: endotracheal airway      Successful airway: ETT  Cuffed: yes   Successful intubation technique: direct laryngoscopy  Endotracheal tube insertion site: oral  Blade: Toi  Blade size: 3  ETT size (mm): 7.0  Cormack-Lehane Classification: grade I - full view of glottis  Placement verified by: chest auscultation and capnometry   Measured from: lips  ETT/EBT  to lips (cm): 22  Number of attempts at approach: 1  Assessment: lips, teeth, and gum same as pre-op and atraumatic intubation

## 2019-11-19 NOTE — ANESTHESIA POSTPROCEDURE EVALUATION
Patient: Odalis Solo    Procedure Summary     Date:  11/19/19 Room / Location:  [facility] OR  / [facility] MAIN OR    Anesthesia Start:  0711 Anesthesia Stop:  0904    Procedure:  CERVICAL DISCECTOMY ANTERIOR WITH FUSION, C4-6 (N/A Spine Cervical) Diagnosis:       Cervical spondylosis with myelopathy and radiculopathy      (Cervical spondylosis with myelopathy and radiculopathy [M47.12, M47.22])    Surgeon:  Ian Foley MD Provider:  Alfredo Workman MD    Anesthesia Type:  general ASA Status:  3          Anesthesia Type: general  Last vitals  BP   135/78 (11/19/19 0947)   Temp   97 °F (36.1 °C) (11/19/19 0902)   Pulse   70 (11/19/19 0947)   Resp   12 (11/19/19 0947)     SpO2   100 % (11/19/19 0947)     Post Anesthesia Care and Evaluation    Patient location during evaluation: PACU  Patient participation: complete - patient participated  Level of consciousness: awake  Pain score: 3 (See nurse's notes for pain score)  Pain management: adequate  Airway patency: patent  Anesthetic complications: No anesthetic complications  PONV Status: none  Cardiovascular status: acceptable  Respiratory status: acceptable  Hydration status: acceptable    Comments: Patient seen and examined postoperatively; vital signs stable; SpO2 greater than or equal to 90%; cardiopulmonary status stable; nausea/vomiting adequately controlled; pain adequately controlled; no apparent anesthesia complications; patient discharged from anesthesia care when discharge criteria were met

## 2019-11-19 NOTE — ANESTHESIA PREPROCEDURE EVALUATION
Anesthesia Evaluation     Patient summary reviewed and Nursing notes reviewed   NPO Solid Status: > 8 hours  NPO Liquid Status: > 8 hours           Airway   Mallampati: II  TM distance: >3 FB  Neck ROM: full  No difficulty expected  Dental - normal exam   (+) edentulous    Pulmonary - normal exam   Cardiovascular - normal exam    ECG reviewed    (+) hypertension, hyperlipidemia,       Neuro/Psych  (+) TIA,     GI/Hepatic/Renal/Endo    (+)   diabetes mellitus,     Musculoskeletal     Abdominal  - normal exam    Bowel sounds: normal.   Substance History      OB/GYN          Other                        Anesthesia Plan    ASA 3     general   total IV anesthesia  intravenous induction     Use of blood products discussed with patient  Consented to blood products.

## 2019-11-19 NOTE — PLAN OF CARE
Problem: Laminectomy/Foraminotomy/Discectomy (Adult)  Goal: Signs and Symptoms of Listed Potential Problems Will be Absent, Minimized or Managed (Laminectomy/Foraminotomy/Discectomy)  Outcome: Ongoing (interventions implemented as appropriate)    Goal: Anesthesia/Sedation Recovery  Outcome: Ongoing (interventions implemented as appropriate)

## 2019-11-19 NOTE — OP NOTE
CERVICAL DISCECTOMY ANTERIOR WITH FUSION  Procedure Report    Patient Name:  Odalis Solo  YOB: 1955    Date of Surgery:  11/19/2019     Indications:  Mrs. Solo is a 64-year-old lady who suffers from cervical spondylitic radiculopathy with axial back pain.  She also has mild myelopathic features.  She is here today for a C4-6 anterior cervical discectomy and arthrodesis    Pre-op Diagnosis:   Cervical spondylosis with myelopathy and radiculopathy [M47.12, M47.22]       Post-Op Diagnosis Codes:     * Cervical spondylosis with myelopathy and radiculopathy [M47.12, M47.22]    Procedure/CPT® Codes:  1.  C4-6 intracervical discectomy and arthrodesis (Innovasis 28 mm Excella plate)  2.  Use of PEEK interbody spacers (CXHA - 6 x 14 mm cage)  3.  Use of allograft (Bioventus - Osteoamp)    Procedure(s):  CERVICAL DISCECTOMY ANTERIOR WITH FUSION, C4-6    Staff:  Surgeon(s):  Ian Foley MD         Anesthesia: General    Estimated Blood Loss: 15 ml    Implants:    Implant Name Type Inv. Item Serial No.  Lot No. LRB No. Used   ALLOGRFT SPNG OSTEOAMP COMPRESSIBLE SM TALL [id number] - [id number] Implant ALLOGRFT SPNG OSTEOAMP COMPRESSIBLE SM TALL [id number]  [employer] [id number] N/A 1   INTRBDYANT/CERV CXHA PEEK 8D [id number] - [id number] Implant INTRBDYANT/CERV CXHA PEEK 8D [id number]  INNOVASIS 1901 N/A 1   ALLOGRFT SPNG OSTEOAMP COMPRESSIBLE SM TALL [id number] - [id number] Implant ALLOGRFT SPNG OSTEOAMP COMPRESSIBLE SM TALL [id number]  [employer] [id number] N/A 1   INTRBDYANT/CERV CXHA PEEK 8D [id number] - [id number] Implant INTRBDYANT/CERV CXHA PEEK 8D [id number]  INNOVASIS 1901 N/A 1   SCRW VARI OPTERYX 4X14MM - [id number] Implant SCRW VARI OPTERYX 4X14MM  INNOVASIS . N/A 4   SCRW FIX OPTERYX 4X14MM - [id number] Implant SCRW FIX OPTERYX 4X14MM  INNOVASIS . N/A 2   ACP OPTRYX LVL2 28MM - [id number] Implant ACP OPTRYX LVL2 28MM  INNOVASIS . N/A 1       Specimen:          None         Findings: Dictated    Complications: None    Description of Procedure:    After induction of general anesthesia with intravenous agents patient was intubated and placed on the operating table in the supine position.  A transverse shoulder roll was then placed in the head was hyperextended and rested on a surgical pillow.  The neuro monitoring technician placed electrodes for free running EMG and SSEP.  Baseline recordings were performed demonstrating good activity.  All pressure points were adequately padded and inspected and the arms were tucked.  The neck was prepped prepped and draped in a sterile fashion.  An incision was made in the right lower portion of the neck with the midportion of the incision being at the medial border of the sternocleidomastoid muscle.  Hemostasis was achieved with bipolar cautery and using pickups and Metzenbaum scissors the subcutaneous tissue was dissected off of the platysma.  At this time the platysma was divided longitudinally and dissection was carried down through the middle cervical fascia, between the tracheoesophageal complex and carotid complex.  Dissection was carried above the omohyoid muscle which had been mobilized.  Dissection was carried down to the anterior aspect of the cervical spine.  A needle was placed in the first available disc space.  Intraoperative x-ray demonstrated this to be C4-5.  Using Bovie cautery the soft tissue was dissected off of the C4-6 segments and the longus colli muscles were elevated off both sides of the anterior cervical spine using Bovie cautery.  A Leksell rondure was then used to remove the anterior osteophytes.  Trimline retractors were then placed and used to hold the esophagus, trachea, and recurrent laryngeal bundle medially and the carotid bundle and its contents laterally.  The disc space at the C4-5 level was incised with an 11 blade scalpel and disc material was removed with the 0 and 3-0 up-biting and straight curettes. The  pituitary was also used to remove disc material.  Following this the posterior lip of the vertebral body was drilled off in combination with the uncovertebral joints on each side.  The posterior longitudinal ligament was left intact and was quite mobile.  Bilateral foraminotomies were then performed with a combination of 1 and 2 mm Kerrison punches completely decompressing the spinal cord and the nerve roots.  Once the nerve roots were completely decompressed bleeding was controlled with a combination of the bipolar cautery, FloSeal.  Once the bleeding was stopped the disc space was sized and a 6 x 14 mm CXHA PEEK was packed with the allograft and tapped into the disc space under direct vision.    Following this attention was turned to the C5-6 level.  At this level the same procedure was carried out as described and a 6 x 14 mm size cage was placed at this segment.  A 28 mm Excella plate was then attached to the front of the cervical spine using 14 mm screws.  The retractors were removed and final x-rays taken. Bleeding was controlled with the FloSeal which was then irrigated out.  Inspection demonstrated hemostasis.  The incision was closed in layers, dressed and the patient was taken to the recovery room in stable condition.  Sponge instrument and needle counts were correct at the end of the procedure.          Ian Foley MD     Date: 11/19/2019  Time: 10:52 AM

## 2019-11-20 ENCOUNTER — APPOINTMENT (OUTPATIENT)
Dept: GENERAL RADIOLOGY | Facility: HOSPITAL | Age: 64
End: 2019-11-20

## 2019-11-20 VITALS
WEIGHT: 147.27 LBS | TEMPERATURE: 98.8 F | RESPIRATION RATE: 16 BRPM | HEART RATE: 81 BPM | DIASTOLIC BLOOD PRESSURE: 74 MMHG | BODY MASS INDEX: 28.91 KG/M2 | HEIGHT: 60 IN | OXYGEN SATURATION: 97 % | SYSTOLIC BLOOD PRESSURE: 121 MMHG

## 2019-11-20 LAB
ALBUMIN SERPL-MCNC: 3.6 G/DL (ref 3.5–5.2)
ALBUMIN/GLOB SERPL: 1.8 G/DL
ALP SERPL-CCNC: 46 U/L (ref 39–117)
ALT SERPL W P-5'-P-CCNC: 11 U/L (ref 1–33)
ANION GAP SERPL CALCULATED.3IONS-SCNC: 11 MMOL/L (ref 5–15)
AST SERPL-CCNC: 19 U/L (ref 1–32)
BASOPHILS # BLD AUTO: 0 10*3/MM3 (ref 0–0.2)
BASOPHILS NFR BLD AUTO: 0.2 % (ref 0–1.5)
BILIRUB SERPL-MCNC: <0.2 MG/DL (ref 0.2–1.2)
BUN BLD-MCNC: 27 MG/DL (ref 8–23)
BUN/CREAT SERPL: 19.7 (ref 7–25)
CALCIUM SPEC-SCNC: 8.4 MG/DL (ref 8.6–10.5)
CHLORIDE SERPL-SCNC: 104 MMOL/L (ref 98–107)
CO2 SERPL-SCNC: 23 MMOL/L (ref 22–29)
CREAT BLD-MCNC: 1.37 MG/DL (ref 0.57–1)
DEPRECATED RDW RBC AUTO: 41.1 FL (ref 37–54)
EOSINOPHIL # BLD AUTO: 0 10*3/MM3 (ref 0–0.4)
EOSINOPHIL NFR BLD AUTO: 0.4 % (ref 0.3–6.2)
ERYTHROCYTE [DISTWIDTH] IN BLOOD BY AUTOMATED COUNT: 12.9 % (ref 12.3–15.4)
GFR SERPL CREATININE-BSD FRML MDRD: 39 ML/MIN/1.73
GLOBULIN UR ELPH-MCNC: 2 GM/DL
GLUCOSE BLD-MCNC: 83 MG/DL (ref 65–99)
HCT VFR BLD AUTO: 28.3 % (ref 34–46.6)
HGB BLD-MCNC: 9.4 G/DL (ref 12–15.9)
LYMPHOCYTES # BLD AUTO: 1.3 10*3/MM3 (ref 0.7–3.1)
LYMPHOCYTES NFR BLD AUTO: 16.4 % (ref 19.6–45.3)
MCH RBC QN AUTO: 30 PG (ref 26.6–33)
MCHC RBC AUTO-ENTMCNC: 33.3 G/DL (ref 31.5–35.7)
MCV RBC AUTO: 90.1 FL (ref 79–97)
MONOCYTES # BLD AUTO: 0.7 10*3/MM3 (ref 0.1–0.9)
MONOCYTES NFR BLD AUTO: 9.1 % (ref 5–12)
NEUTROPHILS # BLD AUTO: 5.8 10*3/MM3 (ref 1.7–7)
NEUTROPHILS NFR BLD AUTO: 73.9 % (ref 42.7–76)
NRBC BLD AUTO-RTO: 0.1 /100 WBC (ref 0–0.2)
PLATELET # BLD AUTO: 177 10*3/MM3 (ref 140–450)
PMV BLD AUTO: 8.3 FL (ref 6–12)
POTASSIUM BLD-SCNC: 4.5 MMOL/L (ref 3.5–5.2)
PROT SERPL-MCNC: 5.6 G/DL (ref 6–8.5)
RBC # BLD AUTO: 3.14 10*6/MM3 (ref 3.77–5.28)
SODIUM BLD-SCNC: 138 MMOL/L (ref 136–145)
WBC NRBC COR # BLD: 7.8 10*3/MM3 (ref 3.4–10.8)

## 2019-11-20 PROCEDURE — 25010000002 CEFAZOLIN PER 500 MG: Performed by: NEUROLOGICAL SURGERY

## 2019-11-20 PROCEDURE — 80053 COMPREHEN METABOLIC PANEL: CPT | Performed by: NEUROLOGICAL SURGERY

## 2019-11-20 PROCEDURE — 85025 COMPLETE CBC W/AUTO DIFF WBC: CPT | Performed by: NEUROLOGICAL SURGERY

## 2019-11-20 PROCEDURE — 25010000002 KETOROLAC TROMETHAMINE PER 15 MG: Performed by: PHYSICIAN ASSISTANT

## 2019-11-20 PROCEDURE — 72040 X-RAY EXAM NECK SPINE 2-3 VW: CPT

## 2019-11-20 RX ORDER — KETOROLAC TROMETHAMINE 30 MG/ML
30 INJECTION, SOLUTION INTRAMUSCULAR; INTRAVENOUS ONCE
Status: COMPLETED | OUTPATIENT
Start: 2019-11-20 | End: 2019-11-20

## 2019-11-20 RX ORDER — CLONIDINE HYDROCHLORIDE 0.1 MG/1
0.2 TABLET ORAL NIGHTLY
Status: DISCONTINUED | OUTPATIENT
Start: 2019-11-20 | End: 2019-11-20 | Stop reason: HOSPADM

## 2019-11-20 RX ORDER — HYDROCODONE BITARTRATE AND ACETAMINOPHEN 7.5; 325 MG/1; MG/1
1 TABLET ORAL EVERY 6 HOURS PRN
Qty: 90 TABLET | Refills: 0 | Status: SHIPPED | OUTPATIENT
Start: 2019-11-20 | End: 2020-08-06

## 2019-11-20 RX ADMIN — Medication 3 ML: at 09:05

## 2019-11-20 RX ADMIN — FUROSEMIDE 40 MG: 40 TABLET ORAL at 09:03

## 2019-11-20 RX ADMIN — GABAPENTIN 600 MG: 300 CAPSULE ORAL at 05:34

## 2019-11-20 RX ADMIN — GABAPENTIN 600 MG: 300 CAPSULE ORAL at 13:54

## 2019-11-20 RX ADMIN — ACETAMINOPHEN 650 MG: 325 TABLET ORAL at 05:34

## 2019-11-20 RX ADMIN — SPIRONOLACTONE 50 MG: 25 TABLET, FILM COATED ORAL at 09:03

## 2019-11-20 RX ADMIN — ONDANSETRON HYDROCHLORIDE 4 MG: 4 TABLET, FILM COATED ORAL at 08:44

## 2019-11-20 RX ADMIN — CEFAZOLIN SODIUM 2 G: 10 INJECTION, POWDER, FOR SOLUTION INTRAVENOUS at 05:34

## 2019-11-20 RX ADMIN — DULOXETINE HYDROCHLORIDE 30 MG: 30 CAPSULE, DELAYED RELEASE ORAL at 09:03

## 2019-11-20 RX ADMIN — KETOROLAC TROMETHAMINE 30 MG: 30 INJECTION, SOLUTION INTRAMUSCULAR at 08:30

## 2019-11-20 RX ADMIN — ACETAMINOPHEN 650 MG: 325 TABLET ORAL at 13:54

## 2019-11-20 RX ADMIN — ACETAMINOPHEN 650 MG: 325 TABLET ORAL at 01:10

## 2019-11-20 RX ADMIN — GLIPIZIDE 5 MG: 5 TABLET ORAL at 09:03

## 2019-11-20 RX ADMIN — ATORVASTATIN CALCIUM 10 MG: 10 TABLET, FILM COATED ORAL at 09:05

## 2019-11-20 NOTE — PROGRESS NOTES
Neurosurgery Progress Note      Patient: Odalis Solo    YOB: 1955    Medical Record Number: [id number]    Attending Physician: Ian Foley MD    Date of Admission: 11/19/2019  5:48 AM    Status Post: CERVICAL DISCECTOMY ANTERIOR WITH FUSION, C4-6    Post Operative Day Number: POD 1    Admitting Dx: Cervical spondylosis with myelopathy and radiculopathy [M47.12, M47.22]  Cervical spondylosis with myelopathy and radiculopathy [M47.12, M47.22]  Cervical spondylosis with myelopathy and radiculopathy [M47.12, M47.22]    General Appearance:  64 y.o. female     Current Problem List:   Patient Active Problem List   Diagnosis   • Cervical spondylosis with myelopathy and radiculopathy           Current Medications:  Scheduled Meds:  atorvastatin 10 mg Oral Daily   cloNIDine 0.2 mg Oral Nightly   Dulaglutide 0.75 mg Subcutaneous Weekly   DULoxetine 30 mg Oral Daily   furosemide 40 mg Oral Daily   gabapentin 600 mg Oral Q8H   glipizide 5 mg Oral BID AC   hydrALAZINE 50 mg Oral Q8H   ketorolac 30 mg Intravenous Once   metoprolol succinate  mg Oral Q24H   sodium chloride 3 mL Intravenous Q12H   spironolactone 50 mg Oral Daily     Continuous Infusions:  sodium chloride 9 mL/hr     PRN Meds:.•  acetaminophen  •  bisacodyl  •  bisacodyl  •  diphenhydrAMINE  •  docusate sodium  •  HYDROcodone-acetaminophen  •  HYDROmorphone **AND** naloxone  •  hydrOXYzine  •  ondansetron **OR** ondansetron  •  sodium chloride  •  sodium chloride      Diagnostic Tests:    Lab Results (last 24 hours)     Procedure Component Value Units Date/Time    Comprehensive Metabolic Panel [[id number]]  (Abnormal) Collected:  11/20/19 0435    Specimen:  Blood Updated:  11/20/19 0545     Glucose 83 mg/dL      BUN 27 mg/dL      Creatinine 1.37 mg/dL      Sodium 138 mmol/L      Potassium 4.5 mmol/L      Chloride 104 mmol/L      CO2 23.0 mmol/L      Calcium 8.4 mg/dL      Total Protein 5.6 g/dL      Albumin 3.60 g/dL      ALT (SGPT) 11  U/L      AST (SGOT) 19 U/L      Alkaline Phosphatase 46 U/L      Total Bilirubin <0.2 mg/dL      eGFR Non African Amer 39 mL/min/1.73      Globulin 2.0 gm/dL      A/G Ratio 1.8 g/dL      BUN/Creatinine Ratio 19.7     Anion Gap 11.0 mmol/L     Narrative:       GFR Normal >60  Chronic Kidney Disease <60  Kidney Failure <15    CBC & Differential [[id number]] Collected:  11/20/19 0435    Specimen:  Blood Updated:  11/20/19 0528    Narrative:       The following orders were created for panel order CBC & Differential.  Procedure                               Abnormality         Status                     ---------                               -----------         ------                     CBC Auto Differential[[id number]]        Abnormal            Final result                 Please view results for these tests on the individual orders.    CBC Auto Differential [[id number]]  (Abnormal) Collected:  11/20/19 0435    Specimen:  Blood Updated:  11/20/19 0528     WBC 7.80 10*3/mm3      RBC 3.14 10*6/mm3      Hemoglobin 9.4 g/dL      Hematocrit 28.3 %      MCV 90.1 fL      MCH 30.0 pg      MCHC 33.3 g/dL      RDW 12.9 %      RDW-SD 41.1 fl      MPV 8.3 fL      Platelets 177 10*3/mm3      Neutrophil % 73.9 %      Lymphocyte % 16.4 %      Monocyte % 9.1 %      Eosinophil % 0.4 %      Basophil % 0.2 %      Neutrophils, Absolute 5.80 10*3/mm3      Lymphocytes, Absolute 1.30 10*3/mm3      Monocytes, Absolute 0.70 10*3/mm3      Eosinophils, Absolute 0.00 10*3/mm3      Basophils, Absolute 0.00 10*3/mm3      nRBC 0.1 /100 WBC     POC Glucose Once [[id number]]  (Abnormal) Collected:  11/19/19 1650    Specimen:  Blood Updated:  11/19/19 1657     Glucose 120 mg/dL      Comment: Serial Number: [id number]:  [id number]       POC Glucose Once [[id number]]  (Abnormal) Collected:  11/19/19 0909    Specimen:  Blood Updated:  11/19/19 0910     Glucose 148 mg/dL      Comment: Serial Number: [id number]:  [id number]             Imaging  Results (Last 24 Hours)     Procedure Component Value Units Date/Time    XR Spine Cervical 1 View [[id number]] Collected:  11/19/19 1020     Updated:  11/19/19 1023    Narrative:       DATE OF EXAM:  11/19/2019 8:30 AM     PROCEDURE:  XR SPINE CERVICAL 1 VW-     INDICATIONS:  Final intra-op portable for ACDF C4-6; M47.12-Other spondylosis with  myelopathy, cervical region; M47.22-Other spondylosis with  radiculopathy, cervical region     COMPARISON:  March 4, 2019     TECHNIQUE:   A single radiologic view of the cervical spine was obtained.     FINDINGS:  The patient has had anterior discectomy and interbody fusion from C4-5  and C5-6. A screw-plate device is seen from C4 to C6. There some  anterior osseous spurring about the disc space at the C3-4 level. Exam  is somewhat limited by intraoperative hardware.       Impression:       1.Postsurgical changes from C4-5 to C5-6.     Electronically Signed By-Rg Patrick On:11/19/2019 10:21 AM  This report was finalized on [id number] by  Rg Patrick, .    XR Spine Cervical 1 View [[id number]] Collected:  11/19/19 0825     Updated:  11/19/19 0829    Narrative:       DATE OF EXAM:  11/19/2019 7:39 AM     PROCEDURE:  XR SPINE CERVICAL 1 VW-     INDICATIONS:  level verification for ACDF C4-6; M47.12-Other spondylosis with  myelopathy, cervical region; M47.22-Other spondylosis with  radiculopathy, cervical region     COMPARISON:  Cervical spine radiographs 03/04/2019     TECHNIQUE:   2 intraoperative crosstable lateral views of the upper cervical spine  were obtained.     FINDINGS:  Radiopaque probe projects at the anterior C4-C5 disc space. Degenerative  changes are seen in the included upper cervical spine.        Impression:       Intraoperative crosstable lateral views show radiopaque probe at the  anterior C4-C5 disc space.     Electronically Signed By-Milka Covington On:11/19/2019 8:26 AM  This report was finalized on [id number] by  Milka Covington, .          Physical  Exam:   General Appearance:    Alert, cooperative, in no acute distress   Head:    Normocephalic, without obvious abnormality, atraumatic   Back:    Decreased range of motion around her neck due to recent fusion   Abdomen:    non-tender, non-distended   Extremities/MSK:   Moves all extremities well, no edema, no cyanosis, no             Redness    Skin:  Well-healing surgical incision to the anterior neck with surgical glue in place   Neurologic:   Cranial nerves 2 - 12 grossly intact, sensation intact, DTR       present and equal bilaterally          Vitals:    11/19/19 2300 11/20/19 0245 11/20/19 0534 11/20/19 0600   BP: 142/78 95/56 101/63 101/63   BP Location:  Right arm     Patient Position: Lying Lying  Lying   Pulse: 77 75  71   Resp: 16 14  16   Temp: 98.1 °F (36.7 °C) 98.6 °F (37 °C)  99.3 °F (37.4 °C)   TempSrc: Oral Oral  Oral   SpO2: 97% 97%  97%   Weight:       Height:             Assessment:   Postop day 1 from a C4-6 ACDF performed for cervical radiculopathy with myelopathy    Plan:    Plan is to discharge the patient home today with outpatient follow-up in 2 weeks.    We will have the patient fit for a [place] J cervical collar this morning prior to her discharge    Continue Pain management efforts-1 dose of IV Toradol for acute postop inflammation and difficulty swallowing.  Patient is able to handle her own secretions and swallow water she is has some pain with swallowing.  This is to be expected.  I did discuss this with the patient in length and instructed her not to worry.  If She is unable to handle her own secretions she is to go straight to the emergency department after discharge.    Continue mobilization efforts-patient to walk as much as possible    Continue incisional care-keep the incision open to air.  Keep the incision dry.  Do not allow wet washcloth to remain over the incision.  Surgical glue over the incision will dissolve in time.    Continue DVT Prophylaxis    Postop x-rays are  pending    Discharge Plan: Home today after patient is fit for a cervical collar and after her postop x-rays    Date: 11/20/2019    Sierra Arnold PA-C  8:25 AM

## 2019-11-20 NOTE — PLAN OF CARE
Problem: Laminectomy/Foraminotomy/Discectomy (Adult)  Goal: Anesthesia/Sedation Recovery  Outcome: Outcome(s) achieved Date Met: 11/20/19

## 2019-11-20 NOTE — PROGRESS NOTES
Discharge Planning Assessment   [place]     Patient Name: Odalis Solo  MRN: [id number]  Today's Date: 11/20/2019    Admit Date: 11/19/2019    Discharge Needs Assessment     Row Name 11/20/19 1421       Living Environment    Lives With  alone Senior apartments in [facility]    Current Living Arrangements  home/apartment/condo    Primary Care Provided by  self    Provides Primary Care For  no one    Family Caregiver if Needed  none    Able to Return to Prior Arrangements  yes       Resource/Environmental Concerns    Resource/Environmental Concerns  none    Transportation Concerns  car, none       Transition Planning    Patient/Family Anticipates Transition to  home with family    Patient/Family Anticipated Services at Transition  none    Transportation Anticipated  family or friend will provide       Discharge Needs Assessment    Readmission Within the Last 30 Days  no previous admission in last 30 days    Concerns to be Addressed  no discharge needs identified    Equipment Currently Used at Home  cane, straight    Anticipated Changes Related to Illness  none    Equipment Needed After Discharge  none    Discharge Coordination/Progress  Routine discharge home. Sister will help out. Patient denies any needs at this time.         Discharge Plan     Row Name 11/20/19 1424       Plan    Plan  Routine discharge home. Sister will help out. Patient denies any needs at this time.     Provided post acute provider list?  Refused    Patient/Family in Agreement with Plan  yes    Plan Comments  Patient received a neck brace from MD office.              Expected Discharge Date and Time     Expected Discharge Date Expected Discharge Time    Nov 20, 2019         Demographic Summary     Row Name 11/20/19 1420       General Information    Admission Type  inpatient    Arrived From  home    Referral Source  other (see comments)    Reason for Consult  discharge planning    Preferred Language  English     Used During This  Interaction  no        Functional Status     Row Name 11/20/19 1421       Functional Status    Usual Activity Tolerance  good    Current Activity Tolerance  moderate       Functional Status, IADL    Medications  independent    Meal Preparation  independent    Housekeeping  independent    Laundry  independent    Shopping  independent       Mental Status    General Appearance WDL  WDL       Mental Status Summary    Recent Changes in Mental Status/Cognitive Functioning  no changes        Psychosocial     Row Name 11/20/19 1421       Values/Beliefs    Spiritual, Cultural Beliefs, Protestant Practices, Values that Affect Care  no       Behavior WDL    Behavior WDL  WDL       Emotion Mood WDL    Emotion/Mood/Affect WDL  WDL       Speech WDL    Speech WDL  WDL       Perceptual State WDL    Perceptual State WDL  WDL       Thought Process WDL    Thought Process WDL  WDL       Intellectual Performance WDL    Intellectual Performance WDL  WDL    Level of Consciousness  Alert       Coping/Stress    Major Change/Loss/Stressor  medical condition/diagnosis    Patient Personal Strengths  able to adapt    Sources of Support  other family members    Reaction to Health Status  accepting;adjusting    Understanding of Condition and Treatment  adequate understanding of medical condition       C-SSRS (Screen-Recent) Past Month    Q1 Wished to be Dead (Past Month)  no    Q2 Suicidal Thoughts (Past Month)  no    Q6 Suicide Behavior (Lifetime)  no       Violence Risk    Previous Attempt to Harm Others  no          Anna Naegele RN    Phone [phone]  Fax   [phone]

## 2019-11-20 NOTE — DISCHARGE SUMMARY
Odalis Solo  1955    Patient Care Team:  Carol Meehan APRN as PCP - General (Family Medicine)    Date of Admit: 11/19/2019    Date of Discharge:  11/20/2019    Discharge Diagnosis:  Cervical spondylosis with myelopathy and radiculopathy      Procedures Performed  Procedure(s):  CERVICAL DISCECTOMY ANTERIOR WITH FUSION, C4-6       Complications: None    Consultants:   Consults     No orders found for last 30 day(s).          Condition on Discharge: stable    Discharge disposition: home    HPI: Odalis Solo is a 64 y.o. female     Hospital Course: Patient admitted for above procedure. The patient was transferred to [facility] following recovery.  On postop day 1 the patient was fit for a cervical collar.  She was tolerating p.o. intake and ambulating around the floor.  She did complain of some mild pain when she tried to swallow she felt a little nauseated.  This resolved with some medication.  I did instruct the patient to avoid any foods that irritate her throat.  Patient was deemed stable for discharge home and was instructed to follow-up in the office in 2 weeks.    Vitals:    11/20/19 1116   BP: 106/66   Pulse: 71   Resp: 16   Temp: 98.9 °F (37.2 °C)   SpO2: 96%         Lab Results (last 24 hours)     Procedure Component Value Units Date/Time    Comprehensive Metabolic Panel [[id number]]  (Abnormal) Collected:  11/20/19 0435    Specimen:  Blood Updated:  11/20/19 0545     Glucose 83 mg/dL      BUN 27 mg/dL      Creatinine 1.37 mg/dL      Sodium 138 mmol/L      Potassium 4.5 mmol/L      Chloride 104 mmol/L      CO2 23.0 mmol/L      Calcium 8.4 mg/dL      Total Protein 5.6 g/dL      Albumin 3.60 g/dL      ALT (SGPT) 11 U/L      AST (SGOT) 19 U/L      Alkaline Phosphatase 46 U/L      Total Bilirubin <0.2 mg/dL      eGFR Non African Amer 39 mL/min/1.73      Globulin 2.0 gm/dL      A/G Ratio 1.8 g/dL      BUN/Creatinine Ratio 19.7     Anion Gap 11.0 mmol/L     Narrative:       GFR Normal >60  Chronic Kidney  Disease <60  Kidney Failure <15    CBC & Differential [[id number]] Collected:  11/20/19 0435    Specimen:  Blood Updated:  11/20/19 0528    Narrative:       The following orders were created for panel order CBC & Differential.  Procedure                               Abnormality         Status                     ---------                               -----------         ------                     CBC Auto Differential[[id number]]        Abnormal            Final result                 Please view results for these tests on the individual orders.    CBC Auto Differential [[id number]]  (Abnormal) Collected:  11/20/19 0435    Specimen:  Blood Updated:  11/20/19 0528     WBC 7.80 10*3/mm3      RBC 3.14 10*6/mm3      Hemoglobin 9.4 g/dL      Hematocrit 28.3 %      MCV 90.1 fL      MCH 30.0 pg      MCHC 33.3 g/dL      RDW 12.9 %      RDW-SD 41.1 fl      MPV 8.3 fL      Platelets 177 10*3/mm3      Neutrophil % 73.9 %      Lymphocyte % 16.4 %      Monocyte % 9.1 %      Eosinophil % 0.4 %      Basophil % 0.2 %      Neutrophils, Absolute 5.80 10*3/mm3      Lymphocytes, Absolute 1.30 10*3/mm3      Monocytes, Absolute 0.70 10*3/mm3      Eosinophils, Absolute 0.00 10*3/mm3      Basophils, Absolute 0.00 10*3/mm3      nRBC 0.1 /100 WBC     POC Glucose Once [[id number]]  (Abnormal) Collected:  11/19/19 1650    Specimen:  Blood Updated:  11/19/19 1657     Glucose 120 mg/dL      Comment: Serial Number: [id number]:  [id number]             Imaging Results (Last 24 Hours)     Procedure Component Value Units Date/Time    XR Spine Cervical 2 or 3 View [[id number]] Collected:  11/20/19 1107     Updated:  11/20/19 1111    Narrative:       Examination: XR SPINE CERVICAL 2 OR 3 VW-     Date of Exam: 11/20/2019 10:00 AM     Indication: post surgical, C4-6 ACDF, ap/lat only; M47.12-Other  spondylosis with myelopathy, cervical region; M47.22-Other spondylosis  with radiculopathy, cervical region; 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     Comparison: Intraoperative fluoroscopic radiographs 11/19/2019     Technique: Three radiographic views of the cervical spine were obtained.     Findings:  Postoperative changes of recent ACDF noted at the C4-C6 levels. There is  soft tissue swelling in the prevertebral soft tissues with a small  amount of postoperative air within the prevertebral soft tissues. There  is disc narrowing at the C3-4 and C6-7 levels. Prosthetic disc spacers  noted at the C4-5 and C5-6 levels. Lateral masses of C1 are normally  aligned on C2. Carotid calcifications noted on the left. The included  lung apices are clear.        Impression:       1. Postoperative findings of recent ACDF at C4-C6 with hardware intact.  2. Degenerative findings above at C3-4 and C6-7.     Electronically Signed By-Kevin Starkey On:11/20/2019 11:09 AM  This report was finalized on [id number] by  Kevin Starkey, .            Discharge Physical Exam:      General Appearance:    Alert, cooperative, in no acute distress   Head:    Normocephalic, without obvious abnormality, atraumatic   Back:     No kyphosis present, no scoliosis present, no skin lesions,      erythema or scars, no tenderness to percussion or                   palpation,   range of motion normal   Abdomen:    non-tender, non-distended   Extremities/MSK:   Moves all extremities well, no edema, no cyanosis, no             Redness    Skin:  Surgical incision to her anterior neck   Neurologic:   Cranial nerves 2 - 12 grossly intact, sensation intact, DTR       present and equal bilaterally        Discharge Medications  Inspect has been reviewed and narcotic consent is on file in the patient's chart.     Your medication list      START taking these medications      Instructions Last Dose Given Next Dose Due   HYDROcodone-acetaminophen 7.5-325 MG per tablet  Commonly known as:  NORCO      Take 1 tablet by mouth Every 6 (Six) Hours As Needed for Moderate  Pain .          CONTINUE taking these medications      Instructions Last Dose Given Next Dose Due   Acetaminophen 500 MG capsule      Take 500 mg by mouth Every 4 (Four) Hours As Needed.       chlorhexidine 4 % external liquid  Commonly known as:  HIBICLENS      Wash BID x 2 days. Do NOT get into eyes or wash face.       cloNIDine 0.2 MG tablet  Commonly known as:  CATAPRES      clonidine HCl 0.2 mg tablet  BID       DULoxetine 30 MG capsule  Commonly known as:  CYMBALTA      Take 30 mg by mouth Daily.       fenofibrate 160 MG tablet      fenofibrate 160 mg tablet Daily       furosemide 40 MG tablet  Commonly known as:  LASIX      furosemide 40 mg tablet Daily       gabapentin 600 MG tablet  Commonly known as:  NEURONTIN      Take 600 mg by mouth 3 (Three) Times a Day.       glipizide 5 MG tablet  Commonly known as:  GLUCOTROL      glipizide 5 mg tablet  BID       hydrALAZINE 50 MG tablet  Commonly known as:  APRESOLINE      hydralazine 50 mg tablet  TID       hydrOXYzine 50 MG tablet  Commonly known as:  ATARAX      hydroxyzine HCl 50 mg tablet @ HS       metoprolol succinate  MG 24 hr tablet  Commonly known as:  TOPROL-XL      METOPROLOL SUCCINATE  MG XR24H-TAB  BID       pravastatin 20 MG tablet  Commonly known as:  PRAVACHOL      pravastatin 20 mg tablet @ HS       spironolactone 50 MG tablet  Commonly known as:  ALDACTONE      Take 50 mg by mouth Daily.       TRULICITY 0.75 MG/0.5ML solution pen-injector  Generic drug:  Dulaglutide      Trulicity 0.75 mg/0.5 mL subcutaneous pen injector Weekly on Tuesday       VASCEPA 1 g capsule capsule  Generic drug:  icosapent ethyl      VASCEPA CAPS   BID       VITAMIN D3 PO      Take 2,000 Units by mouth Daily.             Where to Get Your Medications      You can get these medications from any pharmacy    Bring a paper prescription for each of these medications  · HYDROcodone-acetaminophen 7.5-325 MG per tablet         Discharge Diet:   Diet Instructions      Diet:      Diet Texture / Consistency:  Regular          Activity at Discharge:   Activity Instructions     Discharge Activity      INSTRUCTION & CARE AFTER YOUR ANTERIOR CERVICAL FUSION    [facility]   [address] [place] [address]  [address]   P: [phone]  F: [phone]    Ian Foley M.D., F.A.C.S     Wear your neck collar when you are up more than 45 degrees for more than 15 minutes, except when showering, shaving, eating or drinking, being careful not to bend your head forward, backward, or to the side while the collar is off.    Wear your collar when you are riding in a vehicle.    No lifting anything heavier than gallon of milk    No driving for one week. We recommend that you limit riding in a car because of the risk of an accident. If you are a passenger, wear your seatbelt. In order to advance to driving, you will have to adjust your mirrors so that you are not turning your head and looking behind you.     You may bend over or reach over your head as long as you don't lift anything heavy.    Walk as much as possible.    Leave the incision open to air. If you notice any redness, swelling or drainage, call the office.     You may shower the evening of discharge. Don't let the water beat directly on the incision. Gently pat the incision with mild soap and water, pat dry.     Schedule a 2 week follow up appointment.    You have a prescription for pain medication that will last you through to your 2 week follow up appointment. This prescription refill cannot be called in to the pharmacy. You must see us for your two week follow up appointment for a refill.    You may have some difficulty with swallowing. This is normal and may persist for up to 6 months post operatively. We recommend a soft to chew diet to include thickened liquids, cold ice water and easy to chew food. Avoid fried foods and chips. If you are unable to swallow your own secretions, please call the  office.    Don't be alarmed if you experience some of your pre-operative symptoms after going home. This is not uncommon and normally goes away in a few days but may last longer. Pain, aching and stiffness in the neck, across the shoulders and between the shoulder blades are very common. If you have any questions or concerns don't hesitate to call the office.    Do NOT take any anti-inflammatory medications to include Ibuprofen, Aleve, Naproxen, Advil, Mobic, Celebrex    Constipation is common after surgery. Your bowels are slow due to anesthesia, opioid pain medications and lack of movement. We do not want you to strain to use the restroom. Use a mild stool softener or laxative as needed. Drink plenty of liquids to include water and juice.     Nausea is common with pain medications. To reduce this chance, do not take your medication on an empty stomach.          Call for: questions or concerns    Follow-up Appointments  No future appointments.  Follow-up Information     Ian Foley MD Follow up in 2 week(s).    Specialties:  Neurosurgery, Spine Surgery  Contact information:  [facility] [address] [phone]             Carol Meehan, APRN .    Specialty:  Family Medicine  Contact information:  [facility] [place] [address] [phone]                     I discussed the discharge instructions with patient    Sierra Arnold PA-C  11/20/19  1:16 PM

## 2019-11-20 NOTE — PLAN OF CARE
Problem: Laminectomy/Foraminotomy/Discectomy (Adult)  Goal: Signs and Symptoms of Listed Potential Problems Will be Absent, Minimized or Managed (Laminectomy/Foraminotomy/Discectomy)  Outcome: Ongoing (interventions implemented as appropriate)

## 2019-11-21 ENCOUNTER — READMISSION MANAGEMENT (OUTPATIENT)
Dept: CALL CENTER | Facility: HOSPITAL | Age: 64
End: 2019-11-21

## 2019-11-21 NOTE — PROGRESS NOTES
Case Management Discharge Note      Final Note: Routine discharge home. Sister will help out. Patient denies any needs at this time    Provided post acute provider list?: Refused              Final Discharge Disposition Code: 01 - home or self-care

## 2019-11-21 NOTE — OUTREACH NOTE
Prep Survey      Responses   Facility patient discharged from?  [facility]   Is patient eligible?  Yes   Discharge diagnosis  CERVICAL SPONDYLOSIS WITH MYELOPATHY AND RADICULOPATHY   Does the patient have one of the following disease processes/diagnoses(primary or secondary)?  General Surgery   Does the patient have Home health ordered?  No   Is there a DME ordered?  No   Medication alerts for this patient  NORCO   Prep survey completed?  Yes          Saba Price LPN

## 2019-11-22 ENCOUNTER — READMISSION MANAGEMENT (OUTPATIENT)
Dept: CALL CENTER | Facility: HOSPITAL | Age: 64
End: 2019-11-22

## 2019-11-22 NOTE — OUTREACH NOTE
General Surgery Week 1 Survey      Responses   Facility patient discharged from?  [facility]   Does the patient have one of the following disease processes/diagnoses(primary or secondary)?  General Surgery   Is there a successful TCM telephone encounter documented?  No   Week 1 attempt successful?  Yes   Call start time  1407   Call end time  1412   Medication alerts for this patient  NORCO   Meds reviewed with patient/caregiver?  Yes   Is the patient having any side effects they believe may be caused by any medication additions or changes?  No   Does the patient have all medications related to this admission filled (includes all antibiotics, pain medications, etc.)  Yes   Is the patient taking all medications as directed (includes completed medication regime)?  Yes   Does the patient have a follow up appointment scheduled with their surgeon?  Yes   Has the patient kept scheduled appointments due by today?  N/A   Has home health visited the patient within 72 hours of discharge?  N/A   Did the patient receive a copy of their discharge instructions?  Yes   Nursing interventions  Reviewed instructions with patient   What is the patient's perception of their health status since discharge?  Improving   Nursing interventions  Nurse provided patient education   Is the patient /caregiver able to teach back basic post-op care?  Continue use of incentive spirometry at least 1 week post discharge, Practice 'cough and deep breath', Take showers only when approved by MD-sponge bathe until then, Drive as instructed by MD in discharge instructions, No tub bath, swimming, or hot tub until instructed by MD, Keep incision areas clean,dry and protected, Do not remove steri-strips, Lifting as instructed by MD in discharge instructions   Is the patient/caregiver able to teach back signs and symptoms of incisional infection?  Increased redness, swelling or pain at the incisonal site, Increased drainage or bleeding, Incisional warmth, Pus or  odor from incision, Fever   Is the patient/caregiver able to teach back steps to recovery at home?  Set small, achievable goals for return to baseline health, Rest and rebuild strength, gradually increase activity, Make a list of questions for surgeon's appointment   Is the patient/caregiver able to teach back the hierarchy of who to call/visit for symptoms/problems? PCP, Specialist, Home health nurse, Urgent Care, ED, 911  Yes   Week 1 call completed?  Yes   Revoked  No further contact(revokes)-requires comment          Saba Price LPN

## 2019-12-05 ENCOUNTER — OFFICE VISIT (OUTPATIENT)
Dept: NEUROSURGERY | Facility: CLINIC | Age: 64
End: 2019-12-05

## 2019-12-05 VITALS
HEIGHT: 60 IN | DIASTOLIC BLOOD PRESSURE: 81 MMHG | WEIGHT: 145.8 LBS | BODY MASS INDEX: 28.62 KG/M2 | HEART RATE: 73 BPM | SYSTOLIC BLOOD PRESSURE: 128 MMHG

## 2019-12-05 DIAGNOSIS — Z98.1 S/P CERVICAL SPINAL FUSION: Primary | ICD-10-CM

## 2019-12-05 PROBLEM — M50.30 DDD (DEGENERATIVE DISC DISEASE), CERVICAL: Status: ACTIVE | Noted: 2019-04-24

## 2019-12-05 PROBLEM — E11.9 TYPE 2 DIABETES MELLITUS (HCC): Status: ACTIVE | Noted: 2019-12-05

## 2019-12-05 PROBLEM — M21.70 LEG LENGTH DISCREPANCY: Status: ACTIVE | Noted: 2017-02-14

## 2019-12-05 PROBLEM — M54.16 LUMBAR RADICULOPATHY: Status: ACTIVE | Noted: 2017-06-02

## 2019-12-05 PROBLEM — M25.512 PAIN IN LEFT SHOULDER: Status: ACTIVE | Noted: 2019-02-27

## 2019-12-05 PROBLEM — I10 BENIGN ESSENTIAL HTN: Status: ACTIVE | Noted: 2019-12-05

## 2019-12-05 PROBLEM — E78.5 HYPERLIPIDEMIA: Status: ACTIVE | Noted: 2019-12-05

## 2019-12-05 PROBLEM — E66.9 OBESITY: Status: ACTIVE | Noted: 2019-12-05

## 2019-12-05 PROBLEM — Z83.3 FAMILY HISTORY OF DIABETES MELLITUS: Status: ACTIVE | Noted: 2019-12-05

## 2019-12-05 PROBLEM — I10 HYPERTENSION: Status: ACTIVE | Noted: 2019-12-05

## 2019-12-05 PROBLEM — M54.2 NECK PAIN: Status: ACTIVE | Noted: 2019-03-04

## 2019-12-05 PROBLEM — I63.9 STROKE: Status: ACTIVE | Noted: 2019-12-05

## 2019-12-05 PROBLEM — M70.70 BURSITIS OF HIP: Status: ACTIVE | Noted: 2017-03-28

## 2019-12-05 PROBLEM — M47.812 CERVICAL SPONDYLOSIS: Status: ACTIVE | Noted: 2019-04-24

## 2019-12-05 PROBLEM — M54.12 CERVICAL RADICULOPATHY: Status: ACTIVE | Noted: 2019-03-04

## 2019-12-05 PROCEDURE — 99024 POSTOP FOLLOW-UP VISIT: CPT | Performed by: PHYSICIAN ASSISTANT

## 2019-12-05 NOTE — PROGRESS NOTES
"Subjective   Odalis Solo is a 64 y.o. female.     Chief Complaint   Patient presents with   • Post-op     ACDF C4-6     Visit Vitals  /81   Pulse 73   Ht 152.4 cm (60\")   Wt 66.1 kg (145 lb 12.8 oz)   BMI 28.47 kg/m²       History of Present Illness:  Ms. Odalis Solo is a 64-year-old female that presents the office today as a postop follow-up.  On 2018 she underwent a C4-6 ACDF.  Patient states that her preoperative symptoms are about 90% better.  She does not have any pain or numbness down her left upper extremity.  She only has some intermittent numbness in her right thumb and pointer finger.  She does have some posterior neck pain but this is positional.  She has been wearing her neck brace daily basis.  She denies any difficulty swallowing, gait instability, fever, chills, weight loss.    The following portions of the patient's history were reviewed and updated as appropriate: allergies, current medications, past family history, past medical history, past social history, past surgical history and problem list.    Review of Systems      Past Surgical History:   Procedure Laterality Date   • ANTERIOR CERVICAL DISCECTOMY W/ FUSION N/A 2019    Procedure: CERVICAL DISCECTOMY ANTERIOR WITH FUSION, C4-6;  Surgeon: Ian Foley MD;  Location: [facility];  Service: Neurosurgery   • APPENDECTOMY     • BRAIN SURGERY      x7   • CARPAL TUNNEL RELEASE     •  SECTION     • CHOLECYSTECTOMY OPEN     • HIP SURGERY     • HYSTERECTOMY     • JOINT REPLACEMENT Left     hip   • SHOULDER ARTHROSCOPY     • TONSILLECTOMY         Past Medical History:   Diagnosis Date   • Diabetes mellitus (CMS/HCC)    • Hyperlipidemia    • Hypertension    • Low back pain    • MRSA (methicillin resistant Staphylococcus aureus)     after brain surgery   • TIA (transient ischemic attack)        Objective   Physical Exam  Neurologic Exam  Ortho Exam        Assessment and Plan:   Ms. Solo is a 64-year-old female that is " 2 weeks postop from a C4-6 ACDF.  Patient states she is doing pretty well in the postoperative period and she has no pain in her left arm anymore.  She does have some residual pain just in the thumb and pointer on her right hand.  This may take several weeks to resolve.  Patient is aware of this.  She does have some mild pain in the back of her neck but this is more positional.  I did review range of motion exercises with the patient that she can do on a daily basis.  Explained that they needs to be gentle movements and no harsh movements around her neck.  She is to continue to wear her cervical collar on a daily basis especially when she is in a vehicle.  Her incision is now well-healed and she can wait to the end of the month before taking a bath or submerging in any body of water.  She can continue to take shower and wash the incision with soap and water and keep it dry.  Surgical glue still in place and will fall off over time.    At this time Ms. Solo can follow-up in our office in 3 months or sooner for any concerns.      Problems Addressed this Visit     None

## 2020-01-24 ENCOUNTER — TELEPHONE (OUTPATIENT)
Dept: NEUROSURGERY | Facility: CLINIC | Age: 65
End: 2020-01-24

## 2020-01-24 NOTE — TELEPHONE ENCOUNTER
Voicemail from patient is s/p C4-C6 ACDF 11/19/2019. Her follow up appt was moved from 3/5/2020 to 3/25/2020 and wanted to know if she had to continue to wear her cervical collar until she is seen on 3/25/2020 or if she can come out of it now. Please advise.

## 2020-01-28 NOTE — TELEPHONE ENCOUNTER
Per Dr. Foley patient is okay to come out of her cervical collar at this time.     Call placed to patient advised as per Dr. Foley. Patient expressed understanding thanked us for calling.

## 2020-05-13 ENCOUNTER — OFFICE VISIT (OUTPATIENT)
Dept: NEUROSURGERY | Facility: CLINIC | Age: 65
End: 2020-05-13

## 2020-05-13 DIAGNOSIS — Z98.1 S/P CERVICAL SPINAL FUSION: ICD-10-CM

## 2020-05-13 DIAGNOSIS — M54.12 CERVICAL RADICULOPATHY: ICD-10-CM

## 2020-05-13 DIAGNOSIS — R20.2 PARESTHESIA OF RIGHT ARM: Primary | ICD-10-CM

## 2020-05-13 PROCEDURE — 99441 PR PHYS/QHP TELEPHONE EVALUATION 5-10 MIN: CPT | Performed by: NURSE PRACTITIONER

## 2020-05-13 NOTE — PROGRESS NOTES
Subjective   History of Present Illness: Odalis Solo is a 64 y.o. female is here today for follow-up via Telephone Visit. Ms. Solo is s/p C4-6 ACDF done by Dr. Foley 11/19/2019.    You have chosen to receive care through a telephone visit. Do you consent to use a telephone visit for your medical care today? Yes      History of Present Illness     She is 6 months postop C4-C6 ACDF.  She says most of the preop symptoms have resolved.  She still has some numbness and tingling in the right thumb.  She describes it as burning sensation at times.  She also gets a sensation in her palm. She also gets burning in the right elbow. Before surgery she had more of a tightness in the outside of her right arm and numbness in the thumb.. She says if she accidentally hits the elbow on anything, it shoots a terrible pain down her forearm. She denies any swelling in the elbow or hand. She denies any similar symptoms in the left arm except an occasional tingling sensation in the pinky side of the left hand. She has a history of bilateral carpal tunnel release over 25 years ago. She has been taking Tylenol as she cannot take NSAIDs. She also takes gabapentin twice daily chronically. She uses ice packs on her elbow as needed.  She denies any weakness in the hand.  She denies any trouble with her incision.  She has no hoarseness of voice or difficulty swallowing.    The following portions of the patient's history were reviewed and updated as appropriate: allergies, current medications, past family history, past medical history, past social history, past surgical history and problem list.    Review of Systems   Genitourinary: Negative for difficulty urinating.   Musculoskeletal: Positive for myalgias. Negative for neck pain.   Neurological: Positive for numbness. Negative for weakness.   All other systems reviewed and are negative.      Objective     .There were no vitals taken for this visit.   There is no height or weight on file to  calculate BMI.    Last BMI= 28.5  Physical Exam   Constitutional: She is oriented to person, place, and time.   Neurological: She is oriented to person, place, and time.   Psychiatric: Her speech is normal.     Neurologic Exam     Mental Status   Oriented to person, place, and time.   Speech: speech is normal   Level of consciousness: alert        Assessment/Plan   Independent Review of Radiographic Studies:      I personally reviewed the images from the following studies.    No recent imaging    Medical Decision Making:      This was a telephone visit agreed upon by the patient due to risk of coronavirus.  I spent 9 minutes on the phone discussing her symptoms and treatment options.    She is overall satisfied with the results of surgery, but has developed fairly new symptoms since surgery.  The sensation in her right thumb and the palm of her hand is now described as a burning sensation.  She also now has the burning sensation in the elbow and will get a shooting pain down her forearm with any irritation of the elbow.  She has a history of carpal tunnel release 25 years ago so we will investigate further with EMG/NCV of the right arm or possibly both arms if needed for comparison.  We will bring her back afterwards to discuss the results and treatment options.  She is currently on Neurontin and is taking Tylenol as needed.  She denies any swelling or redness of the elbow and cannot take NSAIDs due to having 1 kidney.  We will see her back after completion of the EMG/NCV.  At that time we may also obtain cervical x-rays to evaluate the bony healing since her for cervical fusion.  She understands and agrees the plan.    Diagnoses and all orders for this visit:    Paresthesia of right arm  -     EMG Right Arm; Future  -     Nerve Conduction Test Right Arm; Future    Cervical radiculopathy      Return for After EMG/NCV tests.

## 2020-06-22 RX ORDER — METOPROLOL TARTRATE 100 MG/1
100 TABLET ORAL 2 TIMES DAILY
COMMUNITY
Start: 2020-04-06 | End: 2020-08-06

## 2020-06-22 RX ORDER — CYCLOBENZAPRINE HCL 10 MG
TABLET ORAL
COMMUNITY
Start: 2020-04-06 | End: 2020-10-15

## 2020-06-22 RX ORDER — ACETAMINOPHEN 160 MG
2000 TABLET,DISINTEGRATING ORAL DAILY
COMMUNITY
Start: 2020-04-06 | End: 2020-08-11

## 2020-06-22 NOTE — PROGRESS NOTES
EMG and Nerve Conduction Studies    The complete report includes the data sheets.     Referring Doctor:Luz Maria DAS    History:RUE PARESTHESIA OF RIGHT ARM    Results:    1.  The right median sensory and motor nerve conduction studies are normal.    2.  The right ulnar sensory and ulnar motor nerve conduction studies were normal.    3.  EMG of the deltoid and triceps muscle showed normal insertional activity there were some increased amplitude and prolonged duration motor units and decreased recruitment in the triceps muscle.  There were no fibrillations or positive sharp waves.  The extensor digitorum first dorsal interosseous and abductor pollicis brevis muscles were normal.    Impression:    This is a mildly abnormal study and that there are some chronic neurogenic changes predominantly in the right C6 myotome however there were no acute neurogenic changes in any muscles examined in the C5-T1 myotomes.  There is no evidence of focal median or ulnar neuropathy.      Joseph Seipel, M.D.

## 2020-06-23 ENCOUNTER — PROCEDURE VISIT (OUTPATIENT)
Dept: NEUROLOGY | Facility: CLINIC | Age: 65
End: 2020-06-23

## 2020-06-23 VITALS — TEMPERATURE: 98.2 F

## 2020-06-23 DIAGNOSIS — R20.2 PARESTHESIA OF RIGHT ARM: ICD-10-CM

## 2020-06-23 DIAGNOSIS — M54.12 CERVICAL RADICULOPATHY: Primary | ICD-10-CM

## 2020-06-23 PROCEDURE — 95886 MUSC TEST DONE W/N TEST COMP: CPT | Performed by: PSYCHIATRY & NEUROLOGY

## 2020-06-23 PROCEDURE — 95908 NRV CNDJ TST 3-4 STUDIES: CPT | Performed by: PSYCHIATRY & NEUROLOGY

## 2020-07-10 ENCOUNTER — OFFICE (AMBULATORY)
Dept: URBAN - METROPOLITAN AREA CLINIC 64 | Facility: CLINIC | Age: 65
End: 2020-07-10

## 2020-07-10 VITALS
WEIGHT: 156 LBS | HEART RATE: 90 BPM | HEIGHT: 60 IN | DIASTOLIC BLOOD PRESSURE: 76 MMHG | SYSTOLIC BLOOD PRESSURE: 103 MMHG

## 2020-07-10 DIAGNOSIS — K21.9 GASTRO-ESOPHAGEAL REFLUX DISEASE WITHOUT ESOPHAGITIS: ICD-10-CM

## 2020-07-10 DIAGNOSIS — I10 ESSENTIAL (PRIMARY) HYPERTENSION: ICD-10-CM

## 2020-07-10 DIAGNOSIS — R13.10 DYSPHAGIA, UNSPECIFIED: ICD-10-CM

## 2020-07-10 DIAGNOSIS — E11.9 TYPE 2 DIABETES MELLITUS WITHOUT COMPLICATIONS: ICD-10-CM

## 2020-07-10 PROCEDURE — 99213 OFFICE O/P EST LOW 20 MIN: CPT | Performed by: INTERNAL MEDICINE

## 2020-07-10 RX ORDER — OMEPRAZOLE 40 MG/1
40 CAPSULE, DELAYED RELEASE ORAL
Qty: 90 | Refills: 3 | Status: COMPLETED
Start: 2020-07-10 | End: 2023-03-02

## 2020-07-15 ENCOUNTER — ON CAMPUS - OUTPATIENT (AMBULATORY)
Dept: URBAN - METROPOLITAN AREA HOSPITAL 2 | Facility: HOSPITAL | Age: 65
End: 2020-07-15

## 2020-07-15 VITALS
DIASTOLIC BLOOD PRESSURE: 86 MMHG | OXYGEN SATURATION: 98 % | HEART RATE: 81 BPM | DIASTOLIC BLOOD PRESSURE: 83 MMHG | OXYGEN SATURATION: 97 % | TEMPERATURE: 97.5 F | HEIGHT: 60 IN | DIASTOLIC BLOOD PRESSURE: 102 MMHG | OXYGEN SATURATION: 99 % | RESPIRATION RATE: 16 BRPM | HEART RATE: 73 BPM | HEART RATE: 77 BPM | DIASTOLIC BLOOD PRESSURE: 69 MMHG | DIASTOLIC BLOOD PRESSURE: 82 MMHG | WEIGHT: 157 LBS | RESPIRATION RATE: 18 BRPM | OXYGEN SATURATION: 100 % | HEART RATE: 85 BPM | SYSTOLIC BLOOD PRESSURE: 131 MMHG | SYSTOLIC BLOOD PRESSURE: 126 MMHG | HEART RATE: 79 BPM | SYSTOLIC BLOOD PRESSURE: 130 MMHG | SYSTOLIC BLOOD PRESSURE: 124 MMHG

## 2020-07-15 DIAGNOSIS — K22.10 ULCER OF ESOPHAGUS WITHOUT BLEEDING: ICD-10-CM

## 2020-07-15 DIAGNOSIS — K22.2 ESOPHAGEAL OBSTRUCTION: ICD-10-CM

## 2020-07-15 DIAGNOSIS — K21.0 GASTRO-ESOPHAGEAL REFLUX DISEASE WITH ESOPHAGITIS: ICD-10-CM

## 2020-07-15 DIAGNOSIS — R13.10 DYSPHAGIA, UNSPECIFIED: ICD-10-CM

## 2020-07-15 PROBLEM — K20.8 OTHER ESOPHAGITIS: Status: ACTIVE | Noted: 2020-07-15

## 2020-07-15 PROCEDURE — 43450 DILATE ESOPHAGUS 1/MULT PASS: CPT | Performed by: INTERNAL MEDICINE

## 2020-07-15 PROCEDURE — 43235 EGD DIAGNOSTIC BRUSH WASH: CPT | Performed by: INTERNAL MEDICINE

## 2020-08-06 ENCOUNTER — OFFICE VISIT (OUTPATIENT)
Dept: ORTHOPEDIC SURGERY | Facility: CLINIC | Age: 65
End: 2020-08-06

## 2020-08-06 VITALS
SYSTOLIC BLOOD PRESSURE: 104 MMHG | WEIGHT: 145 LBS | HEIGHT: 55 IN | HEART RATE: 91 BPM | DIASTOLIC BLOOD PRESSURE: 70 MMHG | BODY MASS INDEX: 33.56 KG/M2

## 2020-08-06 DIAGNOSIS — M25.511 ACUTE PAIN OF RIGHT SHOULDER: Primary | ICD-10-CM

## 2020-08-06 PROCEDURE — 99213 OFFICE O/P EST LOW 20 MIN: CPT | Performed by: ORTHOPAEDIC SURGERY

## 2020-08-06 RX ORDER — OMEPRAZOLE 10 MG/1
40 CAPSULE, DELAYED RELEASE ORAL EVERY MORNING
COMMUNITY
End: 2021-08-11

## 2020-08-06 NOTE — PROGRESS NOTES
Patient ID: Odalis Solo is a 65 y.o. female.    Chief Complaint:    Chief Complaint   Patient presents with   • Right Shoulder - Consult       HPI:  Odalis is a 65-year-old female here with several months of right shoulder pain.  She had a history of rotator cuff repair about 20 years ago.  There is no recent injury but she has noted significant loss of range of motion and strength in the shoulder.  She has pain in the impingement area and bicep groove which is worse at night, and is no better with oral medication or stretching.  It is sharp and a 6/10  Past Medical History:   Diagnosis Date   • Diabetes mellitus (CMS/HCC)    • Hyperlipidemia    • Hypertension    • Low back pain    • MRSA (methicillin resistant Staphylococcus aureus)     after brain surgery   • TIA (transient ischemic attack)        Past Surgical History:   Procedure Laterality Date   • ANTERIOR CERVICAL DISCECTOMY W/ FUSION N/A 2019    Procedure: CERVICAL DISCECTOMY ANTERIOR WITH FUSION, C4-6;  Surgeon: Ian Foley MD;  Location: [facility];  Service: Neurosurgery   • APPENDECTOMY     • BRAIN SURGERY      x7   • CARPAL TUNNEL RELEASE     •  SECTION     • CHOLECYSTECTOMY OPEN     • HIP SURGERY     • HYSTERECTOMY     • JOINT REPLACEMENT Left     hip   • SHOULDER ARTHROSCOPY     • TONSILLECTOMY         Family History   Problem Relation Age of Onset   • Cancer Mother    • Heart disease Mother    • Hypertension Mother    • Hyperlipidemia Mother    • Cancer Father    • Diabetes Sister    • Asthma Sister    • Leukemia Brother    • Hypertension Brother           Social History     Occupational History   • Not on file   Tobacco Use   • Smoking status: Former Smoker     Types: Cigarettes   • Smokeless tobacco: Never Used   Substance and Sexual Activity   • Alcohol use: Yes     Comment: 2 times a month   • Drug use: Yes     Types: Hydrocodone   • Sexual activity: Defer      Review of Systems   Cardiovascular: Negative for chest  "pain.   Musculoskeletal: Positive for arthralgias.       Objective:    /70   Pulse 91   Ht 127 cm (50\")   Wt 65.8 kg (145 lb)   BMI 40.78 kg/m²     Physical Examination:  She is a pleasant female in no distress. She is alert and oriented x3 and appears her stated age.  Right shoulder demonstrates a healed mini open deltoid incision from cuff repair.  There is mild supraspinatus atrophy.  She has pain over the impingement area.  Passive elevation is 160 degrees abduction 130 degrees external rotation 30 degrees, internal rotation S1.  She has pain and weakness on Speed, [place], and supraspinatus testing.  Belly press and liftoff are 4/5 on 3/5.Sensory and motor exam are intact all distributions. Radial pulse is palpable and capillary refill is less than two seconds to all digits  Imaging:  X-rays demonstrate well-maintained joint spaces    Assessment:  Right shoulder pain possible cuff tear    Plan:  Recommend MRI to evaluate his rotator cuff          Disclaimer: Please note that areas of this note were completed with computer voice recognition software.  Quite often unanticipated grammatical, syntax, homophones, and other interpretive errors are inadvertently transcribed by the computer software. Please excuse any errors that have escaped final proofreading.  "

## 2020-08-12 ENCOUNTER — HOSPITAL ENCOUNTER (OUTPATIENT)
Dept: MRI IMAGING | Facility: HOSPITAL | Age: 65
Discharge: HOME | End: 2020-08-12
Admitting: ORTHOPAEDIC SURGERY

## 2020-08-12 DIAGNOSIS — M25.511 ACUTE PAIN OF RIGHT SHOULDER: ICD-10-CM

## 2020-08-12 PROCEDURE — 73221 MRI JOINT UPR EXTREM W/O DYE: CPT

## 2020-08-19 ENCOUNTER — OFFICE VISIT (OUTPATIENT)
Dept: ORTHOPEDIC SURGERY | Facility: CLINIC | Age: 65
End: 2020-08-19

## 2020-08-19 ENCOUNTER — PREP FOR SURGERY (OUTPATIENT)
Dept: OTHER | Facility: HOSPITAL | Age: 65
End: 2020-08-19

## 2020-08-19 VITALS
SYSTOLIC BLOOD PRESSURE: 114 MMHG | DIASTOLIC BLOOD PRESSURE: 75 MMHG | BODY MASS INDEX: 30.43 KG/M2 | HEIGHT: 60 IN | HEART RATE: 88 BPM | WEIGHT: 155 LBS

## 2020-08-19 DIAGNOSIS — M75.121 COMPLETE TEAR OF RIGHT ROTATOR CUFF, UNSPECIFIED WHETHER TRAUMATIC: Primary | ICD-10-CM

## 2020-08-19 DIAGNOSIS — R79.1 ABNORMAL COAGULATION PROFILE: ICD-10-CM

## 2020-08-19 PROCEDURE — 99214 OFFICE O/P EST MOD 30 MIN: CPT | Performed by: ORTHOPAEDIC SURGERY

## 2020-08-19 NOTE — PROGRESS NOTES
"     Patient ID: Odalis Solo is a 65 y.o. female.  Right shoulder pain  Odalis is a 65 female with continued right shoulder pain, she has difficulty at night with overhead activities, pain is sharp and a 6/10 the day    Review of Systems:    Right shoulder pain  Denies chest pain    Objective:    /75   Pulse 88   Ht 152.4 cm (60\")   Wt 70.3 kg (155 lb)   BMI 30.27 kg/m²     Physical Examination:   She is a pleasant female in no distress. She is alert and oriented x3 and appears her stated age.  Right shoulder demonstrates a healed mini open deltoid incision from cuff repair.  There is mild supraspinatus atrophy.  She has pain over the impingement area.  Passive elevation is 160 degrees abduction 130 degrees external rotation 30 degrees, internal rotation S1.  She has pain and weakness on Speed, [place], and supraspinatus testing.  Belly press and liftoff are 4/5 on 3/5.Sensory and motor exam are intact all distributions. Radial pulse is palpable and capillary refill is less than two seconds to all digits      Imaging:   MRI demonstrates full-thickness moderately retracted and thinned tear of the supraspinatus with upper subscapularis tearing and on my read possible bicep instability with fluid in the biceps sheath and mild to moderate degenerative joint disease    Assessment:    Right shoulder full-thickness cuff tear with possible bicep tendinitis    Plan:  Treatment options discussed and she would like to with right shoulder arthroscopy with rotator cuff repair and possible bicep tenodesis.Risks and benefits, specifically risks of bleeding, scar, infection, fracture, stiffness, retear, nerve, tendon, artery damage, the need for further surgery, DVT, and loss of life or limb and rehab were discussed. All questions were answered and addressed.          Disclaimer: Please note that areas of this note were completed with computer voice recognition software.  Quite often unanticipated grammatical, syntax, " homophones, and other interpretive errors are inadvertently transcribed by the computer software. Please excuse any errors that have escaped final proofreading.

## 2020-08-20 PROBLEM — M75.121 COMPLETE TEAR OF RIGHT ROTATOR CUFF: Status: ACTIVE | Noted: 2020-08-20

## 2020-09-09 ENCOUNTER — HOSPITAL ENCOUNTER (OUTPATIENT)
Dept: CARDIOLOGY | Facility: HOSPITAL | Age: 65
Discharge: HOME | End: 2020-09-09

## 2020-09-09 ENCOUNTER — LAB (OUTPATIENT)
Dept: LAB | Facility: HOSPITAL | Age: 65
End: 2020-09-09

## 2020-09-09 DIAGNOSIS — M75.121 COMPLETE TEAR OF RIGHT ROTATOR CUFF, UNSPECIFIED WHETHER TRAUMATIC: ICD-10-CM

## 2020-09-09 DIAGNOSIS — R79.1 ABNORMAL COAGULATION PROFILE: ICD-10-CM

## 2020-09-09 LAB
ANION GAP SERPL CALCULATED.3IONS-SCNC: 11.8 MMOL/L (ref 5–15)
APTT PPP: 27.6 SECONDS (ref 24–31)
BASOPHILS # BLD AUTO: 0.03 10*3/MM3 (ref 0–0.2)
BASOPHILS NFR BLD AUTO: 0.6 % (ref 0–1.5)
BUN SERPL-MCNC: 25 MG/DL (ref 8–23)
BUN/CREAT SERPL: 23.1 (ref 7–25)
CALCIUM SPEC-SCNC: 9.4 MG/DL (ref 8.6–10.5)
CHLORIDE SERPL-SCNC: 101 MMOL/L (ref 98–107)
CO2 SERPL-SCNC: 21.2 MMOL/L (ref 22–29)
CREAT SERPL-MCNC: 1.08 MG/DL (ref 0.57–1)
DEPRECATED RDW RBC AUTO: 38.1 FL (ref 37–54)
EOSINOPHIL # BLD AUTO: 0.13 10*3/MM3 (ref 0–0.4)
EOSINOPHIL NFR BLD AUTO: 2.5 % (ref 0.3–6.2)
ERYTHROCYTE [DISTWIDTH] IN BLOOD BY AUTOMATED COUNT: 12.3 % (ref 12.3–15.4)
GFR SERPL CREATININE-BSD FRML MDRD: 51 ML/MIN/1.73
GLUCOSE SERPL-MCNC: 117 MG/DL (ref 65–99)
HCT VFR BLD AUTO: 32.5 % (ref 34–46.6)
HGB BLD-MCNC: 11.3 G/DL (ref 12–15.9)
IMM GRANULOCYTES # BLD AUTO: 0.02 10*3/MM3 (ref 0–0.05)
IMM GRANULOCYTES NFR BLD AUTO: 0.4 % (ref 0–0.5)
INR PPP: 0.95 (ref 0.93–1.1)
LYMPHOCYTES # BLD AUTO: 1.63 10*3/MM3 (ref 0.7–3.1)
LYMPHOCYTES NFR BLD AUTO: 32 % (ref 19.6–45.3)
MCH RBC QN AUTO: 29.6 PG (ref 26.6–33)
MCHC RBC AUTO-ENTMCNC: 34.8 G/DL (ref 31.5–35.7)
MCV RBC AUTO: 85.1 FL (ref 79–97)
MONOCYTES # BLD AUTO: 0.39 10*3/MM3 (ref 0.1–0.9)
MONOCYTES NFR BLD AUTO: 7.6 % (ref 5–12)
NEUTROPHILS NFR BLD AUTO: 2.9 10*3/MM3 (ref 1.7–7)
NEUTROPHILS NFR BLD AUTO: 56.9 % (ref 42.7–76)
NRBC BLD AUTO-RTO: 0 /100 WBC (ref 0–0.2)
PLATELET # BLD AUTO: 216 10*3/MM3 (ref 140–450)
PMV BLD AUTO: 10.9 FL (ref 6–12)
POTASSIUM SERPL-SCNC: 4.4 MMOL/L (ref 3.5–5.2)
PROTHROMBIN TIME: 10.3 SECONDS (ref 9.6–11.7)
RBC # BLD AUTO: 3.82 10*6/MM3 (ref 3.77–5.28)
SODIUM SERPL-SCNC: 134 MMOL/L (ref 136–145)
WBC # BLD AUTO: 5.1 10*3/MM3 (ref 3.4–10.8)

## 2020-09-09 PROCEDURE — 93010 ELECTROCARDIOGRAM REPORT: CPT | Performed by: INTERNAL MEDICINE

## 2020-09-09 PROCEDURE — 85025 COMPLETE CBC W/AUTO DIFF WBC: CPT

## 2020-09-09 PROCEDURE — 93005 ELECTROCARDIOGRAM TRACING: CPT | Performed by: ORTHOPAEDIC SURGERY

## 2020-09-09 PROCEDURE — 80048 BASIC METABOLIC PNL TOTAL CA: CPT

## 2020-09-09 PROCEDURE — 85610 PROTHROMBIN TIME: CPT

## 2020-09-09 PROCEDURE — 36415 COLL VENOUS BLD VENIPUNCTURE: CPT

## 2020-09-09 PROCEDURE — 85730 THROMBOPLASTIN TIME PARTIAL: CPT

## 2020-09-12 ENCOUNTER — LAB (OUTPATIENT)
Dept: LAB | Facility: HOSPITAL | Age: 65
End: 2020-09-12

## 2020-09-12 DIAGNOSIS — M75.121 COMPLETE TEAR OF RIGHT ROTATOR CUFF, UNSPECIFIED WHETHER TRAUMATIC: ICD-10-CM

## 2020-09-12 PROCEDURE — U0004 COV-19 TEST NON-CDC HGH THRU: HCPCS

## 2020-09-12 PROCEDURE — C9803 HOPD COVID-19 SPEC COLLECT: HCPCS

## 2020-09-13 LAB — SARS-COV-2 RNA NOSE QL NAA+PROBE: NOT DETECTED

## 2020-09-14 DIAGNOSIS — M75.121 COMPLETE TEAR OF RIGHT ROTATOR CUFF, UNSPECIFIED WHETHER TRAUMATIC: Primary | ICD-10-CM

## 2020-09-14 RX ORDER — OXYCODONE AND ACETAMINOPHEN 10; 325 MG/1; MG/1
1 TABLET ORAL EVERY 6 HOURS PRN
Qty: 28 TABLET | Refills: 0 | Status: SHIPPED | OUTPATIENT
Start: 2020-09-14 | End: 2020-10-15

## 2020-09-14 RX ORDER — NAPROXEN 500 MG/1
500 TABLET ORAL 2 TIMES DAILY WITH MEALS
Qty: 28 TABLET | Refills: 0 | Status: SHIPPED | OUTPATIENT
Start: 2020-09-14 | End: 2020-10-15

## 2020-09-14 RX ORDER — CEPHALEXIN 500 MG/1
500 CAPSULE ORAL 4 TIMES DAILY
Qty: 4 CAPSULE | Refills: 0 | Status: SHIPPED | OUTPATIENT
Start: 2020-09-14 | End: 2020-09-15

## 2020-09-14 RX ORDER — PROMETHAZINE HYDROCHLORIDE 25 MG/1
25 TABLET ORAL EVERY 6 HOURS PRN
Qty: 21 TABLET | Refills: 1 | Status: SHIPPED | OUTPATIENT
Start: 2020-09-14 | End: 2020-10-15

## 2020-09-14 NOTE — H&P
Patient Care Team:  Carol Meehan APRN as PCP - General (Family Medicine)  Ian Foley MD as Consulting Physician (Neurosurgery)    Chief complaint right shoulder pain    Odalis is a 65-year-old female who with a previous history of rotator cuff repair presented with recurrence for revision cuff repair and possible bicep tenodesis      Review of Systems   Cardiovascular: Negative for chest pain.   Musculoskeletal: Positive for arthralgias.        She is a pleasant female in no distress. She is alert and oriented x3 and appears her stated age.  Right shoulder demonstrates a healed mini open deltoid incision from cuff repair.  There is mild supraspinatus atrophy.  She has pain over the impingement area.  Passive elevation is 160 degrees abduction 130 degrees external rotation 30 degrees, internal rotation S1.  She has pain and weakness on Speed, [place], and supraspinatus testing.  Belly press and liftoff are 4/5 on 3/5.Sensory and motor exam are intact all distributions. Radial pulse is palpable and capillary refill is less than two seconds to all digits        Imaging:   MRI demonstrates full-thickness moderately retracted and thinned tear of the supraspinatus with upper subscapularis tearing and on my read possible bicep instability with fluid in the biceps sheath and mild to moderate degenerative joint disease     Assessment:    Right shoulder full-thickness cuff tear with possible bicep tendinitis     Plan:  Treatment options discussed and she would like to with right shoulder arthroscopy with rotator cuff repair and possible bicep tenodesis.Risks and benefits, specifically risks of bleeding, scar, infection, fracture, stiffness, retear, nerve, tendon, artery damage, the need for further surgery, DVT, and loss of life or limb and rehab were discussed. All questions were answered and addressed.

## 2020-09-15 ENCOUNTER — ANESTHESIA EVENT (OUTPATIENT)
Dept: PERIOP | Facility: HOSPITAL | Age: 65
End: 2020-09-15

## 2020-09-15 ENCOUNTER — ANESTHESIA (OUTPATIENT)
Dept: PERIOP | Facility: HOSPITAL | Age: 65
End: 2020-09-15

## 2020-09-15 ENCOUNTER — HOSPITAL ENCOUNTER (OUTPATIENT)
Facility: HOSPITAL | Age: 65
Setting detail: HOSPITAL OUTPATIENT SURGERY
Discharge: HOME | End: 2020-09-15
Attending: ORTHOPAEDIC SURGERY | Admitting: ORTHOPAEDIC SURGERY

## 2020-09-15 VITALS
TEMPERATURE: 96.8 F | DIASTOLIC BLOOD PRESSURE: 74 MMHG | WEIGHT: 153.66 LBS | BODY MASS INDEX: 30.17 KG/M2 | HEART RATE: 57 BPM | RESPIRATION RATE: 15 BRPM | OXYGEN SATURATION: 97 % | SYSTOLIC BLOOD PRESSURE: 124 MMHG | HEIGHT: 60 IN

## 2020-09-15 DIAGNOSIS — M75.121 COMPLETE TEAR OF RIGHT ROTATOR CUFF, UNSPECIFIED WHETHER TRAUMATIC: ICD-10-CM

## 2020-09-15 LAB — GLUCOSE BLDC GLUCOMTR-MCNC: 137 MG/DL (ref 70–105)

## 2020-09-15 PROCEDURE — C1713 ANCHOR/SCREW BN/BN,TIS/BN: HCPCS | Performed by: ORTHOPAEDIC SURGERY

## 2020-09-15 PROCEDURE — 29826 SHO ARTHRS SRG DECOMPRESSION: CPT | Performed by: ORTHOPAEDIC SURGERY

## 2020-09-15 PROCEDURE — 25010000002 MIDAZOLAM PER 1 MG: Performed by: ANESTHESIOLOGY

## 2020-09-15 PROCEDURE — 25010000002 DEXAMETHASONE PER 1 MG: Performed by: ANESTHESIOLOGY

## 2020-09-15 PROCEDURE — 25010000002 DEXAMETHASONE PER 1 MG: Performed by: ANESTHESIOLOGIST ASSISTANT

## 2020-09-15 PROCEDURE — 25010000002 FENTANYL CITRATE (PF) 100 MCG/2ML SOLUTION: Performed by: ANESTHESIOLOGY

## 2020-09-15 PROCEDURE — 29827 SHO ARTHRS SRG RT8TR CUF RPR: CPT | Performed by: ORTHOPAEDIC SURGERY

## 2020-09-15 PROCEDURE — 25010000002 ROPIVACAINE PER 1 MG: Performed by: ANESTHESIOLOGY

## 2020-09-15 PROCEDURE — 25010000003 LIDOCAINE 1 % SOLUTION 50 ML VIAL: Performed by: ORTHOPAEDIC SURGERY

## 2020-09-15 PROCEDURE — 82962 GLUCOSE BLOOD TEST: CPT

## 2020-09-15 PROCEDURE — 25010000002 EPINEPHRINE PER 0.1 MG: Performed by: ORTHOPAEDIC SURGERY

## 2020-09-15 PROCEDURE — 25010000002 KETOROLAC TROMETHAMINE PER 15 MG: Performed by: ORTHOPAEDIC SURGERY

## 2020-09-15 PROCEDURE — 25010000002 FENTANYL CITRATE (PF) 100 MCG/2ML SOLUTION: Performed by: ANESTHESIOLOGIST ASSISTANT

## 2020-09-15 PROCEDURE — 23430 REPAIR BICEPS TENDON: CPT | Performed by: ORTHOPAEDIC SURGERY

## 2020-09-15 PROCEDURE — 29823 SHO ARTHRS SRG XTNSV DBRDMT: CPT | Performed by: ORTHOPAEDIC SURGERY

## 2020-09-15 PROCEDURE — 25010000003 LIDOCAINE 1 % SOLUTION 20 ML VIAL: Performed by: ORTHOPAEDIC SURGERY

## 2020-09-15 PROCEDURE — 25010000002 ONDANSETRON PER 1 MG: Performed by: ANESTHESIOLOGY

## 2020-09-15 PROCEDURE — 25010000002 PROPOFOL 10 MG/ML EMULSION: Performed by: ANESTHESIOLOGY

## 2020-09-15 DEVICE — HI-FI® PASSING LOOP
Type: IMPLANTABLE DEVICE | Site: SHOULDER | Status: FUNCTIONAL
Brand: HI-FI

## 2020-09-15 DEVICE — CROSSFT KNOTLESS BIOCOMPOSITE 5.5 MM SUTURE ANCHOR
Type: IMPLANTABLE DEVICE | Status: FUNCTIONAL
Brand: CROSSFT

## 2020-09-15 DEVICE — 2MM HI-FI® TAPE BLUE
Type: IMPLANTABLE DEVICE | Site: SHOULDER | Status: FUNCTIONAL
Brand: HI-FI

## 2020-09-15 DEVICE — KT BIOTENODESIS W/FW4PK: Type: IMPLANTABLE DEVICE | Site: SHOULDER | Status: FUNCTIONAL

## 2020-09-15 DEVICE — SUT/ANCH BIOCOMP SWIVELOCK TENODESIS 6.25X19.1: Type: IMPLANTABLE DEVICE | Status: FUNCTIONAL

## 2020-09-15 RX ORDER — PROMETHAZINE HYDROCHLORIDE 25 MG/1
25 TABLET ORAL ONCE AS NEEDED
Status: DISCONTINUED | OUTPATIENT
Start: 2020-09-15 | End: 2020-09-15 | Stop reason: HOSPADM

## 2020-09-15 RX ORDER — SODIUM CHLORIDE 0.9 % (FLUSH) 0.9 %
10 SYRINGE (ML) INJECTION AS NEEDED
Status: DISCONTINUED | OUTPATIENT
Start: 2020-09-15 | End: 2020-09-15 | Stop reason: HOSPADM

## 2020-09-15 RX ORDER — ACETAMINOPHEN 650 MG/1
650 SUPPOSITORY RECTAL ONCE AS NEEDED
Status: DISCONTINUED | OUTPATIENT
Start: 2020-09-15 | End: 2020-09-15 | Stop reason: HOSPADM

## 2020-09-15 RX ORDER — FENTANYL CITRATE 50 UG/ML
INJECTION, SOLUTION INTRAMUSCULAR; INTRAVENOUS
Status: COMPLETED | OUTPATIENT
Start: 2020-09-15 | End: 2020-09-15

## 2020-09-15 RX ORDER — PROMETHAZINE HYDROCHLORIDE 25 MG/1
25 SUPPOSITORY RECTAL ONCE AS NEEDED
Status: DISCONTINUED | OUTPATIENT
Start: 2020-09-15 | End: 2020-09-15 | Stop reason: HOSPADM

## 2020-09-15 RX ORDER — LABETALOL HYDROCHLORIDE 5 MG/ML
5 INJECTION, SOLUTION INTRAVENOUS
Status: DISCONTINUED | OUTPATIENT
Start: 2020-09-15 | End: 2020-09-15 | Stop reason: HOSPADM

## 2020-09-15 RX ORDER — NEOSTIGMINE METHYLSULFATE 5 MG/5 ML
SYRINGE (ML) INTRAVENOUS AS NEEDED
Status: DISCONTINUED | OUTPATIENT
Start: 2020-09-15 | End: 2020-09-15 | Stop reason: SURG

## 2020-09-15 RX ORDER — PHENYLEPHRINE HCL IN 0.9% NACL 0.5 MG/5ML
SYRINGE (ML) INTRAVENOUS AS NEEDED
Status: DISCONTINUED | OUTPATIENT
Start: 2020-09-15 | End: 2020-09-15 | Stop reason: SURG

## 2020-09-15 RX ORDER — LIDOCAINE HYDROCHLORIDE 10 MG/ML
INJECTION, SOLUTION EPIDURAL; INFILTRATION; INTRACAUDAL; PERINEURAL AS NEEDED
Status: DISCONTINUED | OUTPATIENT
Start: 2020-09-15 | End: 2020-09-15 | Stop reason: SURG

## 2020-09-15 RX ORDER — DEXAMETHASONE SODIUM PHOSPHATE 4 MG/ML
INJECTION, SOLUTION INTRA-ARTICULAR; INTRALESIONAL; INTRAMUSCULAR; INTRAVENOUS; SOFT TISSUE AS NEEDED
Status: DISCONTINUED | OUTPATIENT
Start: 2020-09-15 | End: 2020-09-15 | Stop reason: SURG

## 2020-09-15 RX ORDER — PROPOFOL 10 MG/ML
VIAL (ML) INTRAVENOUS AS NEEDED
Status: DISCONTINUED | OUTPATIENT
Start: 2020-09-15 | End: 2020-09-15 | Stop reason: SURG

## 2020-09-15 RX ORDER — ROPIVACAINE HYDROCHLORIDE 5 MG/ML
INJECTION, SOLUTION EPIDURAL; INFILTRATION; PERINEURAL
Status: COMPLETED | OUTPATIENT
Start: 2020-09-15 | End: 2020-09-15

## 2020-09-15 RX ORDER — MIDAZOLAM HYDROCHLORIDE 1 MG/ML
INJECTION INTRAMUSCULAR; INTRAVENOUS
Status: COMPLETED | OUTPATIENT
Start: 2020-09-15 | End: 2020-09-15

## 2020-09-15 RX ORDER — SODIUM CHLORIDE, SODIUM LACTATE, POTASSIUM CHLORIDE, CALCIUM CHLORIDE 600; 310; 30; 20 MG/100ML; MG/100ML; MG/100ML; MG/100ML
9 INJECTION, SOLUTION INTRAVENOUS CONTINUOUS PRN
Status: DISCONTINUED | OUTPATIENT
Start: 2020-09-15 | End: 2020-09-15 | Stop reason: HOSPADM

## 2020-09-15 RX ORDER — ONDANSETRON 2 MG/ML
4 INJECTION INTRAMUSCULAR; INTRAVENOUS ONCE AS NEEDED
Status: DISCONTINUED | OUTPATIENT
Start: 2020-09-15 | End: 2020-09-15 | Stop reason: HOSPADM

## 2020-09-15 RX ORDER — ROCURONIUM BROMIDE 10 MG/ML
INJECTION, SOLUTION INTRAVENOUS AS NEEDED
Status: DISCONTINUED | OUTPATIENT
Start: 2020-09-15 | End: 2020-09-15 | Stop reason: SURG

## 2020-09-15 RX ORDER — FENTANYL CITRATE 50 UG/ML
25 INJECTION, SOLUTION INTRAMUSCULAR; INTRAVENOUS
Status: DISCONTINUED | OUTPATIENT
Start: 2020-09-15 | End: 2020-09-15 | Stop reason: HOSPADM

## 2020-09-15 RX ORDER — GLYCOPYRROLATE 1 MG/5 ML
SYRINGE (ML) INTRAVENOUS AS NEEDED
Status: DISCONTINUED | OUTPATIENT
Start: 2020-09-15 | End: 2020-09-15 | Stop reason: SURG

## 2020-09-15 RX ORDER — ACETAMINOPHEN 325 MG/1
650 TABLET ORAL ONCE AS NEEDED
Status: DISCONTINUED | OUTPATIENT
Start: 2020-09-15 | End: 2020-09-15 | Stop reason: HOSPADM

## 2020-09-15 RX ORDER — DEXAMETHASONE SODIUM PHOSPHATE 4 MG/ML
INJECTION, SOLUTION INTRA-ARTICULAR; INTRALESIONAL; INTRAMUSCULAR; INTRAVENOUS; SOFT TISSUE
Status: COMPLETED | OUTPATIENT
Start: 2020-09-15 | End: 2020-09-15

## 2020-09-15 RX ORDER — ONDANSETRON 2 MG/ML
INJECTION INTRAMUSCULAR; INTRAVENOUS AS NEEDED
Status: DISCONTINUED | OUTPATIENT
Start: 2020-09-15 | End: 2020-09-15 | Stop reason: SURG

## 2020-09-15 RX ADMIN — PHENYLEPHRINE HYDROCHLORIDE 50 MCG: 10 INJECTION INTRAVENOUS at 15:21

## 2020-09-15 RX ADMIN — PROPOFOL 200 MG: 10 INJECTION, EMULSION INTRAVENOUS at 14:49

## 2020-09-15 RX ADMIN — CEFAZOLIN SODIUM 2 G: 1 INJECTION, POWDER, FOR SOLUTION INTRAMUSCULAR; INTRAVENOUS at 14:56

## 2020-09-15 RX ADMIN — FENTANYL CITRATE 100 MCG: 50 INJECTION, SOLUTION INTRAMUSCULAR; INTRAVENOUS at 13:45

## 2020-09-15 RX ADMIN — LIDOCAINE HYDROCHLORIDE 50 MG: 10 INJECTION, SOLUTION EPIDURAL; INFILTRATION; INTRACAUDAL; PERINEURAL at 14:49

## 2020-09-15 RX ADMIN — ONDANSETRON 4 MG: 2 INJECTION INTRAMUSCULAR; INTRAVENOUS at 15:54

## 2020-09-15 RX ADMIN — DEXAMETHASONE SODIUM PHOSPHATE 4 MG: 4 INJECTION, SOLUTION INTRAMUSCULAR; INTRAVENOUS at 13:45

## 2020-09-15 RX ADMIN — ROCURONIUM BROMIDE 10 MG: 10 INJECTION, SOLUTION INTRAVENOUS at 15:09

## 2020-09-15 RX ADMIN — Medication 4 MG: at 16:01

## 2020-09-15 RX ADMIN — FENTANYL CITRATE 25 MCG: 50 INJECTION INTRAMUSCULAR; INTRAVENOUS at 16:50

## 2020-09-15 RX ADMIN — Medication 0.4 MG: at 16:01

## 2020-09-15 RX ADMIN — SODIUM CHLORIDE, SODIUM LACTATE, POTASSIUM CHLORIDE, AND CALCIUM CHLORIDE 9 ML/HR: 600; 310; 30; 20 INJECTION, SOLUTION INTRAVENOUS at 12:50

## 2020-09-15 RX ADMIN — MIDAZOLAM 2 MG: 1 INJECTION INTRAMUSCULAR; INTRAVENOUS at 13:45

## 2020-09-15 RX ADMIN — DEXAMETHASONE SODIUM PHOSPHATE 4 MG: 4 INJECTION, SOLUTION INTRAMUSCULAR; INTRAVENOUS at 15:09

## 2020-09-15 RX ADMIN — ROCURONIUM BROMIDE 40 MG: 10 INJECTION, SOLUTION INTRAVENOUS at 14:49

## 2020-09-15 RX ADMIN — ROPIVACAINE HYDROCHLORIDE 30 ML: 5 INJECTION, SOLUTION EPIDURAL; INFILTRATION; PERINEURAL at 13:45

## 2020-09-15 RX ADMIN — PHENYLEPHRINE HYDROCHLORIDE 100 MCG: 10 INJECTION INTRAVENOUS at 15:13

## 2020-09-15 RX ADMIN — DEXAMETHASONE SODIUM PHOSPHATE 4 MG: 4 INJECTION, SOLUTION INTRAMUSCULAR; INTRAVENOUS at 15:54

## 2020-09-15 NOTE — OP NOTE
SHOULDER ARTHROSCOPY WITH ROTATOR CUFF REPAIR  Procedure Report    Patient Name:  Odalis Solo  YOB: 1955    Date of Surgery:  9/15/2020     Indications: This is a 65 y.o. female with pain to the right shoulder with a history of previous cuff repair.  Imaging demonstrated recurrence rotator cuff tear and possible bicep tendonopathy.Treatment options were discussed.  They desired to proceed with shoulder arthroscopy with rotator cuff repair and possible bicep tenodesis after discussing the risks including bleeding, scarring,  infection, stiffness, nerve damage, tendon damage, artery damage, continued  pain, DVT, loss of life or limb, and a need for further surgery.      Pre-op Diagnosis:   Right shoulder rotator cuff tear with bicep tendinitis       Postop diagnosis  Same plus circumferential labral tear, loose suture material glenohumeral joint, partial subscapularis tear, grade 2 chondral defect glenoid, impingement  Procedure/CPT® Codes: 93129 [id number]    Procedure(s):  SHOULDER ARTHROSCOPY WITH  EXTENSIVE DEBRIDEMENT, ROTATOR CUFF REPAIR and bicep tenodesis subacromial decompression    Assistant: Robby Esquivel certified first assist    was responsible for performing the following activities: Retraction, Suction, Irrigation, Suturing, Closing and Placing Dressing and their skilled assistance was necessary for the success of this case.         Anesthesia: General with Block    Estimated Blood Loss: minimal    Implants:    Implant Name Type Inv. Item Serial No.  Lot No. LRB No. Used Action   SUT TPE HIFI NONABS SHAINA 2MM - [id number] Implant SUT TPE HIFI NONABS SHAINA 2MM  [employer] [id number] Right 1 Implanted   SUT LP NONABS HIFI NMBR2 - [id number] Implant SUT LP NONABS HIFI NMBR2  [employer] [id number] Right 1 Implanted   KT BIOTENODESIS W/FW4PK - [id number] Implant KT BIOTENODESIS W/FW4PK  [employer] [id number] Right 1 Implanted         Complications:  None    Specimens:none    Description of Procedure: The patient's operative site was marked.  Regional anesthesia was administered.  They were brought to the operating room and placed  on the operating room table.  General anesthesia was administered. Antibiotics were dosed.  A timeout was taken, confirming the correct operative site and procedure.  Exam under anesthesia indicated full range of motion and no instability.  They were placed in a semilateral position.  An axillary roll and SCDs were placed.  The right shoulder was prepped and draped in the standard surgical fashion.  The shoulder was injected with local anesthetic and was placed into traction.  A posterior portal was created.  A camera was inserted.  The glenoid chondral surface had a small grade 2 flap at the superior labrum area.  The humerus surface was intact.  The subscapularis was torn over its upper 10%.  The biceps was torn within the joint.  The labrum was torn circumferentially.  The undersurface of the cuff demonstrated thinning of the previous supraspinatus repair. A shaver was inserted.  The labrum was debrided completely along with the upper subscapularis tear.  Scapularis tear did not expose any footprint measure less than 10% of the tendon.   the biceps was pulled into the shoulder and found to be torn and unstable.  It was tenotomized for later tenodesis.  The axillary pouch demonstrated previous suture material that was loose and removed with a grasper. The instruments were placed in the subacromial space.  A full-thickness bursectomy was performed.  The CA ligament was torn and resected revealing underlying bony impingement which was resected with a bur to complete the acromioplasty.  An accessory portal was created.  Previous repair was about 50% intact.  In the central aspect of the repair there was a recurrent defect with thin tissue and a full-thickness tear.  Tear debridement was performed and after tear completion it measured  about 1.5 cm with somewhat thin tissue with mild retraction.  Previous suture material was removed with the tuberosity. The tuberosity was skeletonized to bleeding bone.  A microfracture was performed. Tuberosity skeletonized and a microfracture performed.   Fiber tape suture as well as a fiber link suture were used to create a mattress and ripstop configuration. These were secured to a anchor off the lateral edge of the tuberosity restoring the tendon to its footprint.he wounds were closed with suture and Steri-Strips.     An incision at the lower border of the pectoralis tendon was opened.  The skin and fascia were opened.  The biceps tendon was identified and whipstitched.  A socket was created at the lower border of the pectoralis tendon and the anchor and tendon were inserted in the socket with the elbow in 10 degrees of flexion, restoring the tendon to its resting position.  The wound was irrigated and closed in layers with suture and glue and a sterile dressing was applied.  Then 30 mg of Toradol and lidocaine were injected into the deltoid and a sterile dressing was applied to the rest of the shoulder.  They were placed in a sling and taken to the recovery room.  There were no complications.  I was present for all portions.  All counts were correct.  Good capillary refill was noted to the hand.      Silviano Gong MD     Date: 9/15/2020  Time: 15:57 EDT

## 2020-09-15 NOTE — ANESTHESIA PROCEDURE NOTES
Airway  Urgency: elective    Date/Time: 9/15/2020 2:50 PM  End Time:9/15/2020 2:51 AM  Airway not difficult    General Information and Staff    Patient location during procedure: OR  Anesthesiologist: Amena Hui MD  CRNA: Jerod Cui AA    Indications and Patient Condition  Indications for airway management: airway protection    Preoxygenated: yes  MILS maintained throughout  Mask difficulty assessment: 1 - vent by mask    Final Airway Details  Final airway type: endotracheal airway      Successful airway: ETT  Cuffed: yes   Successful intubation technique: direct laryngoscopy  Facilitating devices/methods: intubating stylet  Endotracheal tube insertion site: oral  Blade: Toi  Blade size: 3  Cormack-Lehane Classification: grade I - full view of glottis  Placement verified by: chest auscultation and capnometry   Number of attempts at approach: 1  Assessment: lips, teeth, and gum same as pre-op and atraumatic intubation

## 2020-09-15 NOTE — ANESTHESIA PREPROCEDURE EVALUATION
Anesthesia Evaluation     Patient summary reviewed and Nursing notes reviewed   no history of anesthetic complications:  NPO Solid Status: > 8 hours  NPO Liquid Status: > 8 hours           Airway   Mallampati: II  TM distance: >3 FB  Neck ROM: full  No difficulty expected  Dental      Pulmonary - negative pulmonary ROS    breath sounds clear to auscultation  Cardiovascular     ECG reviewed  Rhythm: regular  Rate: normal    (+) hypertension, hyperlipidemia,       Neuro/Psych- negative ROS  GI/Hepatic/Renal/Endo    (+) obesity,  GERD,  diabetes mellitus,     Musculoskeletal     (+) neck pain,   Abdominal     Abdomen: soft.   Substance History - negative use     OB/GYN negative ob/gyn ROS         Other - negative ROS                       Anesthesia Plan    ASA 3     general with block     intravenous induction   Postoperative Plan: Expected vent after surgery  Anesthetic plan, all risks, benefits, and alternatives have been provided, discussed and informed consent has been obtained with: patient.    Plan discussed with CAA.

## 2020-09-15 NOTE — ANESTHESIA PROCEDURE NOTES
Peripheral Block    Pre-sedation assessment completed: 9/15/2020 1:00 PM    Patient reassessed immediately prior to procedure    Patient location during procedure: pre-op  Start time: 9/15/2020 1:05 PM  Stop time: 9/15/2020 1:09 PM  Reason for block: at surgeon's request and post-op pain management  Performed by  Anesthesiologist: Amena Hui MD  Assisted by: Goyo Skelotn RN  Preanesthetic Checklist  Completed: patient identified, site marked, surgical consent, pre-op evaluation, timeout performed, IV checked, risks and benefits discussed and monitors and equipment checked  Prep:  Pt Position: sitting  Sterile barriers:cap, gloves and sterile barriers  Prep: ChloraPrep  Patient monitoring: blood pressure monitoring, continuous pulse oximetry and EKG  Procedure  Sedation:yes  Performed under: local infiltration  Guidance:ultrasound guided  ULTRASOUND INTERPRETATION. Using ultrasound guidance a gauge needle was placed in close proximity to the brachial plexus nerve, at which point, under ultrasound guidance anesthetic was injected in the area of the nerve and spread of the anesthesia was seen on ultrasound in close proximity thereto.  There were no abnormalities seen on ultrasound; a digital image was taken; and the patient tolerated the procedure with no complications. Images:still images obtained, printed/placed on chart    Laterality:right  Block Type:interscalene  Injection Technique:single-shot  Needle Type:echogenic  Needle Gauge:20 G  Resistance on Injection: none  Sedation medications used: midazolam (VERSED) injection, 2 mg  fentaNYL citrate (PF) (SUBLIMAZE) injection, 100 mcg  Medications Used: dexamethasone (DECADRON) injection, 4 mg  ropivacaine (NAROPIN) 0.5 % injection, 30 mL  Med admintered at 9/15/2020 1:45 PM      Post Assessment  Patient Tolerance:comfortable throughout block  Complications:no

## 2020-09-15 NOTE — ANESTHESIA POSTPROCEDURE EVALUATION
Patient: Odalis Solo    Procedure Summary     Date: 09/15/20 Room / Location: [facility] OR  / [facility] MAIN OR    Anesthesia Start: 1446 Anesthesia Stop: 1609    Procedure: SHOULDER ARTHROSCOPY WITH  EXTENSIVE DEBRIDEMENT,  SUBACROMINAL DECOMPRESSION, ROTATOR CUFF REPAIR and bicep tenodesis (Right Shoulder) Diagnosis:       Complete tear of right rotator cuff, unspecified whether traumatic      (Complete tear of right rotator cuff, unspecified whether traumatic [M75.121])    Surgeon: Silviano Gong MD Provider: Dorian Dumont MD    Anesthesia Type: general with block ASA Status: 3          Anesthesia Type: general with block    Vitals  Vitals Value Taken Time   /74 09/15/20 1704   Temp 98.2 °F (36.8 °C) 09/15/20 1704   Pulse 62 09/15/20 1705   Resp 12 09/15/20 1704   SpO2 96 % 09/15/20 1705   Vitals shown include unvalidated device data.        Post Anesthesia Care and Evaluation    Patient location during evaluation: PACU  Patient participation: complete - patient participated  Level of consciousness: awake  Pain management: adequate  Airway patency: patent  Anesthetic complications: No anesthetic complications  PONV Status: controlled  Cardiovascular status: acceptable  Respiratory status: acceptable  Hydration status: acceptable

## 2020-09-17 ENCOUNTER — OFFICE VISIT (OUTPATIENT)
Dept: ORTHOPEDIC SURGERY | Facility: CLINIC | Age: 65
End: 2020-09-17

## 2020-09-17 VITALS
SYSTOLIC BLOOD PRESSURE: 119 MMHG | BODY MASS INDEX: 30.04 KG/M2 | HEIGHT: 60 IN | DIASTOLIC BLOOD PRESSURE: 76 MMHG | HEART RATE: 76 BPM | WEIGHT: 153 LBS

## 2020-09-17 DIAGNOSIS — M75.121 COMPLETE TEAR OF RIGHT ROTATOR CUFF, UNSPECIFIED WHETHER TRAUMATIC: ICD-10-CM

## 2020-09-17 DIAGNOSIS — Z47.89 ORTHOPEDIC AFTERCARE: Primary | ICD-10-CM

## 2020-09-17 PROCEDURE — 99024 POSTOP FOLLOW-UP VISIT: CPT | Performed by: ORTHOPAEDIC SURGERY

## 2020-09-17 NOTE — PATIENT INSTRUCTIONS
Shoulder Arthroscopy: Post- Operative Visit Objectives    1) Review the operative findings, procedures and photos.  2) Make sure medications are effective and not causing problems.  a) Pain: Oxycodone or hydrocodone is for pain. You may take 1 tablet every 6 hours as necessary.  Some patients don’t require the use of these…in those circumstances just use Tylenol extra-strength 1 or 2 tablets every 4-6 hours.   b) Naproxen 500 mg. For pain and inflammation.  You should take 1 every twice a day.  Do not use Aleve, Ibuprofen, Motrin or Advil during this time.  If you have had any problems stop taking these medicines and please tell us!  c) Keflex (cephalexin). This is an antibiotic to be taken as a preventive medicine.  Take 1 pill every 6 hours for 1 day. If you have a penicillin allergy this will be replaced by other options.  3) Wound Care:  a) Today we will change your dressings and cover your wounds with a plastic covering called Tegaderm. This will allow you to shower immediately.  Remove the tegaderm and underlying dressings in two weeks then ok to get wet in a shower. No Baths or swimming until 4 weeks after surgery.  Keep a towel on the dressing while applying ice.  4) Exercises and Physical Therapy Remember the protocol is 3 phases:  a) Healing (6 wks)  Continue the use of the sling for 6 weeks; depending on procedure utilized this may be shorter. You can do gentle range of motion exercise of the elbow and wrist immediately with the arm at the side.  Formal physical therapy will usually start in two weeks.    b) Rehabilitative (6 wks) You begin a more aggressive phase of physical therapy at the 6 week tamra. Light strengthening and a continued emphasis on protecting the repair are important at this stage.  c) Restorative (4 wks)  Back to your sports and job activities gradually   5) Follow Up appointments Schedule Follow up visits as directed usually in 4 weeks. Physical therapy will be ordered today and you  should receive a phone call or can schedule yourself if appropriate.  6) Notes  Make sure you have all necessary notes and documentation for school or work.  7) Issues: Remember our goal is to make this process smooth and easy if there is any thing you need please ask us or call back [phone]  8)

## 2020-09-17 NOTE — PROGRESS NOTES
"     Patient ID: Odalis Solo is a 65 y.o. female.  9/15/20 right shoulder arthroscopy with revision cuff repair, subacromial decompression and bicep tenodesis      Objective:    /76   Pulse 76   Ht 152.4 cm (60\")   Wt 69.4 kg (153 lb)   BMI 29.88 kg/m²     Physical Examination:        Imaging:      Assessment:      Plan:    "

## 2020-09-24 ENCOUNTER — TREATMENT (OUTPATIENT)
Dept: PHYSICAL THERAPY | Facility: CLINIC | Age: 65
End: 2020-09-24

## 2020-09-24 DIAGNOSIS — Z98.890 HISTORY OF REPAIR OF RIGHT ROTATOR CUFF: ICD-10-CM

## 2020-09-24 DIAGNOSIS — M25.511 ACUTE PAIN OF RIGHT SHOULDER: Primary | ICD-10-CM

## 2020-09-24 PROCEDURE — G0283 ELEC STIM OTHER THAN WOUND: HCPCS | Performed by: PHYSICAL THERAPIST

## 2020-09-24 PROCEDURE — 97161 PT EVAL LOW COMPLEX 20 MIN: CPT | Performed by: PHYSICAL THERAPIST

## 2020-09-24 PROCEDURE — 97140 MANUAL THERAPY 1/> REGIONS: CPT | Performed by: PHYSICAL THERAPIST

## 2020-09-24 PROCEDURE — 97110 THERAPEUTIC EXERCISES: CPT | Performed by: PHYSICAL THERAPIST

## 2020-09-24 NOTE — PROGRESS NOTES
Physical Therapy Initial Evaluation and Plan of Care    Patient: Odalis Solo   : 1955  Diagnosis/ICD-10 Code:  Acute pain of right shoulder [M25.511]  Referring practitioner: Silviano Gong, *  Date of Initial Visit: 2020  Today's Date: 2020  Patient seen for 1 sessions             Subjective Evaluation    History of Present Illness  Mechanism of injury: Patient is a 65 y.o. WF who presents s/p R RCR on 9/15/20.  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  She had L WILLIAM 2 years ago, cervical fusion last November.  She is not allowed to drive currently.  She has a history of pain med addiction and has taken 2 so far with this surgery.  Patient is R hand dominant.  She reports increased sudden pain yesterday in distal bicep after cleaning, laundry.  Personal goals include: return to driving, housework, writing, eating.  She is sleeping on pillows and has good nights and bad nights.    Patient Goals  Patient goals for therapy: decreased pain, increased motion and increased strength             Objective QuickDASH indicates 80% impairment with limitations in chores, recreation, social, carry, ADLs.  PROM R shoulder:  Flex to 100 deg, abd to 45 deg, IR to belly, ER to 35 deg.  Ecchymosis noted R upper arm.  Tenderness and firmness R distal upper arm after irritation yesterday.  Elbow extension limited to -5 deg due to pain, Supination limited to 10 deg due to pain.        Assessment & Plan     Assessment  Impairments: abnormal muscle firing, abnormal or restricted ROM, activity intolerance, impaired physical strength, lacks appropriate home exercise program, pain with function and weight-bearing intolerance  Prognosis: good  Functional Limitations: carrying objects, lifting, sleeping, pulling, pushing, uncomfortable because of pain, moving in bed, reaching behind back and reaching overhead  Goals  Plan Goals: Patient independent with HEP in 2 weeks  Tolerate 10  min Nustep in 2 weeks  Able to return to driving in 2 weeks  Increase PROM flexion R shoulder to 125 deg in 2 weeks  Improve function as evidenced by QuickDASH score of 20% or less by discharge (80% impairment initially)  Able to return to driving, writing and housework by discharge  Decrease pain by 50% (4/10) by discharge       Plan  Therapy options: will be seen for skilled physical therapy services  Planned therapy interventions: manual therapy, neuromuscular re-education, postural training, strengthening, stretching, home exercise program, functional ROM exercises and flexibility  Treatment plan discussed with: patient  Plan details: 2x's per week up to 20 visits if needed.        Timed:         Manual Therapy:    15     mins  96709;     Therapeutic Exercise:    15     mins  93920;     Neuromuscular Lilly:        mins  22347;    Therapeutic Activity:          mins  85009;     Gait Training:           mins  89405;     Ultrasound:          mins  68383;    Ionto                                   mins   19151  Self-care  ____ mins 43398    Un-Timed:  Electrical Stimulation:    15     mins  73081 ( );  Dry Needling          mins self-pay  Traction          mins 53053  Low Eval     15     Mins  27337  Mod Eval          Mins  62252  High Eval                            Mins  77833  Canal repositioning _____ mins  52953        Timed Treatment:   30   mins   Total Treatment:     60   mins    PT SIGNATURE: Robin A Sprigler, PT   DATE TREATMENT INITIATED: 9/24/2020    Initial Certification  Certification Period: 12/23/2020  I certify that the therapy services are furnished while this patient is under my care.  The services outlined above are required by this patient, and will be reviewed every 90 days.     PHYSICIAN: Silviano Gong MD _____________________________________________________________________________________________________     DATE:  _____________________________________________________________________________________________________________________________________    Please sign and return via fax to [phone].. Thank you, [facility].

## 2020-10-07 ENCOUNTER — TREATMENT (OUTPATIENT)
Dept: PHYSICAL THERAPY | Facility: CLINIC | Age: 65
End: 2020-10-07

## 2020-10-07 DIAGNOSIS — M25.511 ACUTE PAIN OF RIGHT SHOULDER: Primary | ICD-10-CM

## 2020-10-07 DIAGNOSIS — Z98.890 HISTORY OF REPAIR OF RIGHT ROTATOR CUFF: ICD-10-CM

## 2020-10-07 PROCEDURE — G0283 ELEC STIM OTHER THAN WOUND: HCPCS | Performed by: PHYSICAL THERAPIST

## 2020-10-07 PROCEDURE — 97140 MANUAL THERAPY 1/> REGIONS: CPT | Performed by: PHYSICAL THERAPIST

## 2020-10-07 PROCEDURE — 97110 THERAPEUTIC EXERCISES: CPT | Performed by: PHYSICAL THERAPIST
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
## 2020-10-15 ENCOUNTER — OFFICE VISIT (OUTPATIENT)
Dept: ORTHOPEDIC SURGERY | Facility: CLINIC | Age: 65
End: 2020-10-15

## 2020-10-15 VITALS
HEIGHT: 60 IN | HEART RATE: 84 BPM | WEIGHT: 153.4 LBS | SYSTOLIC BLOOD PRESSURE: 121 MMHG | BODY MASS INDEX: 30.12 KG/M2 | DIASTOLIC BLOOD PRESSURE: 79 MMHG

## 2020-10-15 DIAGNOSIS — Z47.89 ORTHOPEDIC AFTERCARE: Primary | ICD-10-CM

## 2020-10-15 PROCEDURE — 99024 POSTOP FOLLOW-UP VISIT: CPT | Performed by: ORTHOPAEDIC SURGERY

## 2020-10-15 RX ORDER — DULAGLUTIDE 1.5 MG/.5ML
INJECTION, SOLUTION SUBCUTANEOUS
COMMUNITY
Start: 2020-09-17 | End: 2022-09-22

## 2020-10-15 NOTE — PROGRESS NOTES
Patient ID: Odalis Solo is a 65 y.o. female.  9/15/20 right shoulder arthroscopy with revision cuff repair, subacromial decompression and bicep tenodesis  Pain mild    Objective:    There were no vitals taken for this visit.    Physical Examination:    Incisions are healed, passive elevation 90, abduction 80, external rotation 30    Imaging:      Assessment:  Doing well for revision cuff repair    Plan:  Restrictions discussed. Continue physical therapy. See me in 8 weeks. Sling for 2 weeks

## 2020-10-19 ENCOUNTER — TREATMENT (OUTPATIENT)
Dept: PHYSICAL THERAPY | Facility: CLINIC | Age: 65
End: 2020-10-19

## 2020-10-19 DIAGNOSIS — Z98.890 HISTORY OF REPAIR OF RIGHT ROTATOR CUFF: ICD-10-CM

## 2020-10-19 DIAGNOSIS — M25.511 ACUTE PAIN OF RIGHT SHOULDER: Primary | ICD-10-CM

## 2020-10-19 PROCEDURE — G0283 ELEC STIM OTHER THAN WOUND: HCPCS | Performed by: PHYSICAL THERAPIST

## 2020-10-19 PROCEDURE — 97140 MANUAL THERAPY 1/> REGIONS: CPT | Performed by: PHYSICAL THERAPIST

## 2020-10-19 PROCEDURE — 97110 THERAPEUTIC EXERCISES: CPT | Performed by: PHYSICAL THERAPIST

## 2020-10-19 NOTE — PROGRESS NOTES
Physical Therapy Daily Progress Note      Patient: Odalis Solo   : 1955  Diagnosis/ICD-10 Code:  Acute pain of right shoulder [M25.511]  Referring practitioner: Silviano Gong, *  Date of Initial Visit: Type: THERAPY  Noted: 2020  Today's Date: 10/19/2020  Patient seen for 3 sessions         Odalis Solo reports: she has been doing good at home with HEP but states her elbow has been sore and she has tried stretching it to feel better. Pt. Reports her sling has felt too long and she feels like it isn't holding her arm up well.    Objective   See Exercise, Manual, and Modality Logs for complete treatment.     Assessment/Plan   Pt. Tolerates treatment well today demonstrating full PROM on pulleys this date and scapular mobility that is improving as well. Pt. Sling was adjusted and reported to be much more comfortable upon leaving.     Progress per Plan of Care           Timed:         Manual Therapy:    15     mins  55774;     Therapeutic Exercise:    15     mins  54907;     Neuromuscular Lilly:        mins  66059;    Therapeutic Activity:          mins  97627;     Gait Training:           mins  90473;     Ultrasound:          mins  05665;    Ionto                                   mins   68065  Self Care                            mins   54895  Canalith Repos                   mins  4209    Un-Timed:  Electrical Stimulation:    15     mins  12969 ( );  Dry Needling          mins self-pay  Traction          mins 80216  Low Eval          Mins  90318  Mod Eval          Mins  95971  High Eval                            Mins  67783    Timed Treatment:   30   mins   Total Treatment:     45   mins    Manda Mcrae PTA  Physical Therapist Assistant License #[id number]

## 2020-10-23 ENCOUNTER — TREATMENT (OUTPATIENT)
Dept: PHYSICAL THERAPY | Facility: CLINIC | Age: 65
End: 2020-10-23

## 2020-10-23 DIAGNOSIS — Z98.890 HISTORY OF REPAIR OF RIGHT ROTATOR CUFF: ICD-10-CM

## 2020-10-23 DIAGNOSIS — M25.511 ACUTE PAIN OF RIGHT SHOULDER: Primary | ICD-10-CM

## 2020-10-23 PROCEDURE — G0283 ELEC STIM OTHER THAN WOUND: HCPCS | Performed by: PHYSICAL THERAPIST

## 2020-10-23 PROCEDURE — 97110 THERAPEUTIC EXERCISES: CPT | Performed by: PHYSICAL THERAPIST

## 2020-10-23 PROCEDURE — 97140 MANUAL THERAPY 1/> REGIONS: CPT | Performed by: PHYSICAL THERAPIST

## 2020-10-23 NOTE — PROGRESS NOTES
Physical Therapy Daily Progress Note      Patient: Odalis Solo   : 1955  Diagnosis/ICD-10 Code:  Acute pain of right shoulder [M25.511]  Referring practitioner: Silviano Gong, *  Date of Initial Visit: Type: THERAPY  Noted: 2020  Today's Date: 10/23/2020  Patient seen for 4 sessions         Odalis Solo reports: she is doing good and can't wait to be out of the sling. Pt. States she has had soreness in the Bicep still and at times her shoulder aches more significantly but always has a subtle ache. Pt. Reports self PROM is going very well and only has the soreness and discomfort to complain of.    Objective   See Exercise, Manual, and Modality Logs for complete treatment.     Assessment/Plan  Pt. Tolerated treatment well today and continues to respond well to elbow mobs for relief of stiffness and soreness at elbow. Pt. Had ROM WFL in all directions without pain. Pt. continues to benefit form scapular strengthening and postural education for good mechanics and needs VC's for appropriate shoulder position throughout treatment. Pt. Will benefit from greater strengthening as protocol allows.    Progress per Plan of Care           Timed:         Manual Therapy:    15     mins  21376;     Therapeutic Exercise:    15     mins  66796;     Neuromuscular Lilly:        mins  75051;    Therapeutic Activity:          mins  78127;     Gait Training:           mins  37140;     Ultrasound:          mins  42865;    Ionto                                   mins   96590  Self Care                            mins   92375  Canalith Repos                   mins  4209    Un-Timed:  Electrical Stimulation:    15     mins  93569 (MC );  Dry Needling          mins self-pay  Traction          mins 29069  Low Eval          Mins  08983  Mod Eval          Mins  27477  High Eval                            Mins  47782    Timed Treatment:   30   mins   Total Treatment:     45   mins    Manda Mcrae PTA  Physical  Therapist Assistant License #[id number]

## 2020-10-26 ENCOUNTER — TREATMENT (OUTPATIENT)
Dept: PHYSICAL THERAPY | Facility: CLINIC | Age: 65
End: 2020-10-26

## 2020-10-26 DIAGNOSIS — Z98.890 HISTORY OF REPAIR OF RIGHT ROTATOR CUFF: ICD-10-CM

## 2020-10-26 DIAGNOSIS — M25.511 ACUTE PAIN OF RIGHT SHOULDER: Primary | ICD-10-CM

## 2020-10-26 PROCEDURE — 97140 MANUAL THERAPY 1/> REGIONS: CPT | Performed by: PHYSICAL THERAPIST

## 2020-10-26 PROCEDURE — G0283 ELEC STIM OTHER THAN WOUND: HCPCS | Performed by: PHYSICAL THERAPIST

## 2020-10-26 PROCEDURE — 97110 THERAPEUTIC EXERCISES: CPT | Performed by: PHYSICAL THERAPIST

## 2020-10-26 NOTE — PROGRESS NOTES
Physical Therapy Daily Progress Note    Patient: Odalis Solo   : 1955  Diagnosis/ICD-10 Code:  Acute pain of right shoulder [M25.511]  Referring practitioner: Silviano Gong, *  Date of Initial Visit: Type: THERAPY  Noted: 2020  Today's Date: 10/26/2020  Patient seen for 5 sessions             Subjective States MD wants her to wear sling 2 more months (~12/15/20).     Objective   See Exercise, Manual, and Modality Logs for complete treatment. Still reluctant to wear sling, encouraged compliance. PROM is WNL flexion/scaption and IR.  QuickDASH indicates only 9% impairment. Progressing well per protocol.        Assessment/Plan  Patient independent with HEP in 2 weeks - MET  Tolerate 10 min Nustep in 2 weeks - NOT MET  Able to return to driving in 2 weeks - MET  Increase PROM flexion R shoulder to 125 deg in 2 weeks - MET  Improve function as evidenced by QuickDASH score of 20% or less by discharge (80% impairment initially)- MET (9% on 10/26/20)  Able to return to driving, writing and housework by discharge- MET  Decrease pain by 50% (4/10) by discharge  - MET (2/10 average)    Progress per Plan of Care up to 20 visits if needed           Timed:         Manual Therapy:    10     mins  58060;     Therapeutic Exercise:    20     mins  32769;     Neuromuscular Lilly:        mins  79856;    Therapeutic Activity:          mins  07378;     Gait Training:           mins  75903;     Ultrasound:          mins  14497;    Ionto                                   mins   01964  Self Care                            mins   33555      Un-Timed:  Electrical Stimulation:    15     mins  99348 ( );  Dry Needling          mins self-pay  Traction          mins 72605  Low Eval          Mins  29061  Mod Eval          Mins  82197  High Eval                            Mins  01175  Canalith Repos                   mins  96361    Timed Treatment:   30   mins   Total Treatment:     45   mins    Robin A Sprigler,  PT  Physical Therapist

## 2020-11-09 ENCOUNTER — TREATMENT (OUTPATIENT)
Dept: PHYSICAL THERAPY | Facility: CLINIC | Age: 65
End: 2020-11-09

## 2020-11-09 DIAGNOSIS — M25.511 ACUTE PAIN OF RIGHT SHOULDER: Primary | ICD-10-CM

## 2020-11-09 DIAGNOSIS — Z98.890 HISTORY OF REPAIR OF RIGHT ROTATOR CUFF: ICD-10-CM

## 2020-11-09 PROCEDURE — G0283 ELEC STIM OTHER THAN WOUND: HCPCS | Performed by: PHYSICAL THERAPIST

## 2020-11-09 PROCEDURE — 97110 THERAPEUTIC EXERCISES: CPT | Performed by: PHYSICAL THERAPIST

## 2020-11-09 PROCEDURE — 97140 MANUAL THERAPY 1/> REGIONS: CPT | Performed by: PHYSICAL THERAPIST

## 2020-11-09 NOTE — PROGRESS NOTES
Physical Therapy Daily Progress Note    Patient: Odalis Solo   : 1955  Diagnosis/ICD-10 Code:  Acute pain of right shoulder [M25.511]  Referring practitioner: Silviano Gong, *  Date of Initial Visit: Type: THERAPY  Noted: 2020  Today's Date: 2020  Patient seen for 6 sessions             Subjective My arm is doing great!  She has been having a muscle spasm in her back, took a muscle relaxer which seemed to help but then it came back, wants to make an appointment with PCP in case it's her kidneys.    Objective   See Exercise, Manual, and Modality Logs for complete treatment. Patient not wearing sling in clinic today.  PROM R shoulder is WNL.  Added standing scaption to 90 deg.      Assessment/Plan  Patient independent with HEP in 2 weeks - MET  Tolerate 10 min Nustep in 2 weeks - MET  Able to return to driving in 2 weeks - MET  Increase PROM flexion R shoulder to 125 deg in 2 weeks - MET  Improve function as evidenced by QuickDASH score of 20% or less by discharge (80% impairment initially)- MET (9% on 10/26/20)  Able to return to driving, writing and housework by discharge- MET  Decrease pain by 50% (4/10) by discharge  - MET (2/10 average)    Progress per Plan of Care up to 20 visits if needed.           Timed:         Manual Therapy:    10     mins  39176;     Therapeutic Exercise:    20     mins  14303;     Neuromuscular Lilly:        mins  16020;    Therapeutic Activity:          mins  19995;     Gait Training:           mins  86800;     Ultrasound:          mins  02880;    Ionto                                   mins   90599  Self Care                            mins   10966      Un-Timed:  Electrical Stimulation:    15     mins  36782 ( );  Dry Needling          mins self-pay  Traction          mins 80727  Low Eval          Mins  37869  Mod Eval          Mins  05017  High Eval                            Mins  15496  Canalith Repos                   mins  35328    Timed  Treatment:   30   mins   Total Treatment:     45   mins    Robin A Sprigler, PT  Physical Therapist

## 2020-11-24 ENCOUNTER — TREATMENT (OUTPATIENT)
Dept: PHYSICAL THERAPY | Facility: CLINIC | Age: 65
End: 2020-11-24

## 2020-11-24 DIAGNOSIS — Z98.890 HISTORY OF REPAIR OF RIGHT ROTATOR CUFF: ICD-10-CM

## 2020-11-24 DIAGNOSIS — M25.511 ACUTE PAIN OF RIGHT SHOULDER: Primary | ICD-10-CM

## 2020-11-24 PROCEDURE — 97110 THERAPEUTIC EXERCISES: CPT | Performed by: PHYSICAL THERAPIST

## 2020-11-24 PROCEDURE — 97140 MANUAL THERAPY 1/> REGIONS: CPT | Performed by: PHYSICAL THERAPIST

## 2020-11-24 PROCEDURE — G0283 ELEC STIM OTHER THAN WOUND: HCPCS | Performed by: PHYSICAL THERAPIST

## 2020-11-24 NOTE — PROGRESS NOTES
Physical Therapy Daily Progress Note      Patient: Odalis Solo   : 1955  Diagnosis/ICD-10 Code:  Acute pain of right shoulder [M25.511]  Referring practitioner: Silviano Gong, *  Date of Initial Visit: Type: THERAPY  Noted: 2020  Today's Date: 2020  Patient seen for 7 sessions         Odalis Solo reports: she has been doing much better stating she had a set back one day where her arm fel weak and motion was limited but it has since relieved and she feels very good again. Pt. States her bicep is where she feels her discomfort now and she has sensitivity in that area reminding her of past nerve issues she has had with her neck.    Objective   See Exercise, Manual, and Modality Logs for complete treatment.     Assessment/Plan   Pt. Tolerated exercise well today and continues to show improving range and strength in all planes avoiding extreme extension motion to avoid bicep pain.   Pt. Completes all exercise without pain this visit.    Progress per Plan of Care           Timed:         Manual Therapy:    10     mins  83910;     Therapeutic Exercise:    30     mins  99576;       Un-Timed:  Electrical Stimulation:    15     mins  60968 ( );    Timed Treatment:   40   mins   Total Treatment:     55   mins    Manda Mcrae PTA  Physical Therapist Assistant License #[id number]

## 2020-12-01 ENCOUNTER — TREATMENT (OUTPATIENT)
Dept: PHYSICAL THERAPY | Facility: CLINIC | Age: 65
End: 2020-12-01

## 2020-12-01 DIAGNOSIS — M25.511 ACUTE PAIN OF RIGHT SHOULDER: Primary | ICD-10-CM

## 2020-12-01 DIAGNOSIS — Z98.890 HISTORY OF REPAIR OF RIGHT ROTATOR CUFF: ICD-10-CM

## 2020-12-01 PROCEDURE — G0283 ELEC STIM OTHER THAN WOUND: HCPCS | Performed by: PHYSICAL THERAPIST

## 2020-12-01 PROCEDURE — 97110 THERAPEUTIC EXERCISES: CPT | Performed by: PHYSICAL THERAPIST

## 2020-12-01 PROCEDURE — 97140 MANUAL THERAPY 1/> REGIONS: CPT | Performed by: PHYSICAL THERAPIST

## 2020-12-01 NOTE — PROGRESS NOTES
Physical Therapy Daily Progress Note      Patient: Odalis Solo   : 1955  Diagnosis/ICD-10 Code:  Acute pain of right shoulder [M25.511]  Referring practitioner: Silviano Gong, *  Date of Initial Visit: Type: THERAPY  Noted: 2020  Today's Date: 2020  Patient seen for 8 sessions         Odalis Solo reports: she has been doing much better and states the arm is moving better all of the time. Pt. Does reports however that she still has numbness and burning sensation in hands and arm at times but believes It has to do with previous issues she has had addressed by the MD as nerve damage.    Objective   See Exercise, Manual, and Modality Logs for complete treatment.     Assessment/Plan  Pt. Continues to progress appropriately within protocol and is tolerating exercise in greater ranges continuously. Pt. Has no pain with exercise this visit.    Progress per Plan of Care           Timed:         Manual Therapy:    10     mins  40667;     Therapeutic Exercise:    30     mins  31750;       Un-Timed:  Electrical Stimulation:    15     mins  66419 ( );    Timed Treatment:   40   mins   Total Treatment:     55   mins    Manda Mcrae PTA  Physical Therapist Assistant License #[id number]

## 2020-12-14 ENCOUNTER — TELEPHONE (OUTPATIENT)
Dept: PHYSICAL THERAPY | Facility: CLINIC | Age: 65
End: 2020-12-14

## 2020-12-16 ENCOUNTER — OFFICE VISIT (OUTPATIENT)
Dept: ORTHOPEDIC SURGERY | Facility: CLINIC | Age: 65
End: 2020-12-16

## 2020-12-16 VITALS
WEIGHT: 147 LBS | DIASTOLIC BLOOD PRESSURE: 74 MMHG | HEIGHT: 60 IN | BODY MASS INDEX: 28.86 KG/M2 | HEART RATE: 80 BPM | SYSTOLIC BLOOD PRESSURE: 112 MMHG

## 2020-12-16 DIAGNOSIS — M75.121 COMPLETE TEAR OF RIGHT ROTATOR CUFF, UNSPECIFIED WHETHER TRAUMATIC: Primary | ICD-10-CM

## 2020-12-16 PROCEDURE — 99212 OFFICE O/P EST SF 10 MIN: CPT | Performed by: ORTHOPAEDIC SURGERY

## 2020-12-16 NOTE — PROGRESS NOTES
"     Patient ID: Odalis Solo is a 65 y.o. female.  Right shoulder pain  9/15/20 right shoulder arthroscopy with revision cuff repair, subacromial decompression and bicep tenodesis  Pain mild    Review of Systems:  Right shoulder pain minimal      Objective:    /74   Pulse 80   Ht 152.4 cm (60\")   Wt 66.7 kg (147 lb)   BMI 28.71 kg/m²     Physical Examination:   She is a pleasant female in no distress. She is alert and oriented x3 and appears her stated age.  Right shoulder demonstrates healed incisions, active elevation 180 degrees abduction 40 degrees external rotation 40 degrees, internal rotation L4 with intact repair.Sensory and motor exam are intact all distributions. Radial pulse is palpable and capillary refill is less than two seconds to all digits       Imaging:       Assessment:    Doing well after revision cuff repair    Plan:   Activity as tolerated and see me as needed    Procedures          Disclaimer: Please note that areas of this note were completed with computer voice recognition software.  Quite often unanticipated grammatical, syntax, homophones, and other interpretive errors are inadvertently transcribed by the computer software. Please excuse any errors that have escaped final proofreading.  "

## 2021-02-01 ENCOUNTER — DOCUMENTATION (OUTPATIENT)
Dept: PHYSICAL THERAPY | Facility: CLINIC | Age: 66
End: 2021-02-01

## 2021-02-01 DIAGNOSIS — Z98.890 HISTORY OF REPAIR OF RIGHT ROTATOR CUFF: ICD-10-CM

## 2021-02-01 DIAGNOSIS — M25.511 ACUTE PAIN OF RIGHT SHOULDER: Primary | ICD-10-CM

## 2021-02-01 NOTE — PROGRESS NOTES
Discharge Summary  Discharge Summary from Physical Therapy Report          Goals: Partially Met    Discharge Plan: Continue with current home exercise program as instructed    Comments  See note below.  Patient failed to return for treatment.  Last seen 12/1/2020    Patient seen for 8 sessions            Odalis Solo reports: she has been doing much better and states the arm is moving better all of the time. Pt. Does reports however that she still has numbness and burning sensation in hands and arm at times but believes It has to do with previous issues she has had addressed by the MD as nerve damage.     Objective   See Exercise, Manual, and Modality Logs for complete treatment.      Assessment/Plan  Pt. Continues to progress appropriately within protocol and is tolerating exercise in greater ranges continuously. Pt. Has no pain with exercise this visit.      Date of Discharge 2/1/21        Robin A Sprigler, PT  Physical Therapist

## 2021-08-11 PROBLEM — Z20.822 SUSPECTED COVID-19 VIRUS INFECTION: Status: ACTIVE | Noted: 2021-08-11

## 2021-08-11 PROBLEM — R35.0 URINE FREQUENCY: Status: ACTIVE | Noted: 2021-08-11

## 2021-08-11 PROCEDURE — 87086 URINE CULTURE/COLONY COUNT: CPT | Performed by: EMERGENCY MEDICINE

## 2021-08-11 PROCEDURE — 87077 CULTURE AEROBIC IDENTIFY: CPT | Performed by: EMERGENCY MEDICINE

## 2021-08-11 PROCEDURE — U0003 INFECTIOUS AGENT DETECTION BY NUCLEIC ACID (DNA OR RNA); SEVERE ACUTE RESPIRATORY SYNDROME CORONAVIRUS 2 (SARS-COV-2) (CORONAVIRUS DISEASE [COVID-19]), AMPLIFIED PROBE TECHNIQUE, MAKING USE OF HIGH THROUGHPUT TECHNOLOGIES AS DESCRIBED BY CMS-2020-01-R: HCPCS | Performed by: EMERGENCY MEDICINE

## 2021-08-11 PROCEDURE — 87186 SC STD MICRODIL/AGAR DIL: CPT | Performed by: EMERGENCY MEDICINE

## 2021-08-16 ENCOUNTER — HOSPITAL ENCOUNTER (EMERGENCY)
Facility: HOSPITAL | Age: 66
Discharge: HOME | End: 2021-08-16
Attending: EMERGENCY MEDICINE | Admitting: EMERGENCY MEDICINE

## 2021-08-16 ENCOUNTER — APPOINTMENT (OUTPATIENT)
Dept: GENERAL RADIOLOGY | Facility: HOSPITAL | Age: 66
End: 2021-08-16

## 2021-08-16 VITALS
HEIGHT: 60 IN | SYSTOLIC BLOOD PRESSURE: 128 MMHG | OXYGEN SATURATION: 100 % | TEMPERATURE: 97.9 F | RESPIRATION RATE: 16 BRPM | HEART RATE: 82 BPM | WEIGHT: 147.49 LBS | DIASTOLIC BLOOD PRESSURE: 69 MMHG | BODY MASS INDEX: 28.96 KG/M2

## 2021-08-16 DIAGNOSIS — Z20.822 COVID-19 RULED OUT: ICD-10-CM

## 2021-08-16 DIAGNOSIS — R06.00 DYSPNEA, UNSPECIFIED TYPE: Primary | ICD-10-CM

## 2021-08-16 DIAGNOSIS — J06.9 UPPER RESPIRATORY TRACT INFECTION, UNSPECIFIED TYPE: ICD-10-CM

## 2021-08-16 LAB — SARS-COV-2 RNA PNL SPEC NAA+PROBE: NOT DETECTED

## 2021-08-16 PROCEDURE — U0005 INFEC AGEN DETEC AMPLI PROBE: HCPCS

## 2021-08-16 PROCEDURE — 99283 EMERGENCY DEPT VISIT LOW MDM: CPT

## 2021-08-16 PROCEDURE — U0003 INFECTIOUS AGENT DETECTION BY NUCLEIC ACID (DNA OR RNA); SEVERE ACUTE RESPIRATORY SYNDROME CORONAVIRUS 2 (SARS-COV-2) (CORONAVIRUS DISEASE [COVID-19]), AMPLIFIED PROBE TECHNIQUE, MAKING USE OF HIGH THROUGHPUT TECHNOLOGIES AS DESCRIBED BY CMS-2020-01-R: HCPCS

## 2021-08-16 PROCEDURE — 71045 X-RAY EXAM CHEST 1 VIEW: CPT

## 2021-08-16 NOTE — ED NOTES
Patient reports that she was exposed to covid last week. Has body aches, headache and cough. Had negative covid test 5 days ago. Is not feeling any better and wants to be tested again. Reports currently being treated for a UTI     Ammy Sher RN  08/16/21 1118

## 2021-08-16 NOTE — ED PROVIDER NOTES
Subjective   Patient is a 66-year-old female complaining of cough congestion shortness of breath.  This developed over the past several days patient has fever balm diarrhea or other complaint          Review of Systems  Negative for headache earache sore throat fever abdominal balm diarrhea dysuria or other complaint  Past Medical History:   Diagnosis Date   • Anxiety    • Arthritis    • Chronic kidney disease    • Depression    • Diabetes mellitus (CMS/[facility])    • GERD (gastroesophageal reflux disease)    • History of repair of right rotator cuff 09/15/2020   • Hyperlipidemia    • Hypertension    • Low back pain    • MRSA (methicillin resistant Staphylococcus aureus)     after brain surgery   • Stroke (CMS/[facility])    • Suspected COVID-19 virus infection 2021   • TIA (transient ischemic attack)    • Urine frequency 2021       Allergies   Allergen Reactions   • Sulfa Antibiotics Hives       Past Surgical History:   Procedure Laterality Date   • ANTERIOR CERVICAL DISCECTOMY W/ FUSION N/A 2019    Procedure: CERVICAL DISCECTOMY ANTERIOR WITH FUSION, C4-6;  Surgeon: Ian Foley MD;  Location: [facility];  Service: Neurosurgery   • APPENDECTOMY     • BRAIN SURGERY      x7   • CARPAL TUNNEL RELEASE     •  SECTION     • CHOLECYSTECTOMY OPEN     • HIP SURGERY     • HYSTERECTOMY     • JOINT REPLACEMENT Left     hip   • SHOULDER ARTHROSCOPY     • SHOULDER ARTHROSCOPY W/ ROTATOR CUFF REPAIR Right 9/15/2020    Procedure: SHOULDER ARTHROSCOPY WITH  EXTENSIVE DEBRIDEMENT,  SUBACROMINAL DECOMPRESSION, ROTATOR CUFF REPAIR and bicep tenodesis;  Surgeon: Silviano Gong MD;  Location: [facility];  Service: Orthopedics;  Laterality: Right;   • SHOULDER SURGERY Right 09/15/2020    scope/cuff repair   • TONSILLECTOMY         Family History   Problem Relation Age of Onset   • Cancer Mother    • Heart disease Mother    • Hypertension Mother    • Hyperlipidemia Mother    • Cancer Father    •  Diabetes Sister    • Asthma Sister    • Leukemia Brother    • Hypertension Brother        Social History     Socioeconomic History   • Marital status:      Spouse name: Not on file   • Number of children: Not on file   • Years of education: Not on file   • Highest education level: Not on file   Tobacco Use   • Smoking status: Former Smoker     Types: Cigarettes   • Smokeless tobacco: Never Used   Substance and Sexual Activity   • Alcohol use: Yes     Comment: 2 times a month   • Drug use: Yes     Types: Hydrocodone   • Sexual activity: Defer           Objective   Physical Exam  HEENT exam shows TMs to be clear.  Oropharynx, spit sclerae nonicteric.  Neck has no adenopathy JVD or bruits.  Lungs are clear.  Heart is rate rhythm.  M soft nontender.  Extremity exam unremarkable.  Procedures           ED Course      .this  XR Chest 1 View    Result Date: 8/16/2021  No active disease.  Electronically Signed By-Stu Schaeffer MD On:8/16/2021 11:48 AM This report was finalized on [id number] by  Stu Schaeffer MD.                                         MDM  Number of Diagnoses or Management Options  Diagnosis management comments: Patient is Covid negative.  She has no evidence of pneumonia or bronchitis on x-ray.  She will be discharged with a viral syndrome.  She will see MD for recheck as needed.       Amount and/or Complexity of Data Reviewed  Clinical lab tests: reviewed  Tests in the radiology section of CPT®: reviewed    Risk of Complications, Morbidity, and/or Mortality  Presenting problems: moderate  Diagnostic procedures: moderate  Management options: moderate    Patient Progress  Patient progress: stable      Final diagnoses:   Dyspnea, unspecified type   COVID-19 ruled out   Upper respiratory tract infection, unspecified type       ED Disposition  ED Disposition     ED Disposition Condition Comment    Discharge Stable           No follow-up provider specified.       Medication List      No changes were made  to your prescriptions during this visit.          Jered Ferraro MD  08/16/21 0041

## 2022-04-18 ENCOUNTER — HOSPITAL ENCOUNTER (EMERGENCY)
Facility: HOSPITAL | Age: 67
Discharge: HOME | End: 2022-04-18
Attending: EMERGENCY MEDICINE | Admitting: EMERGENCY MEDICINE

## 2022-04-18 ENCOUNTER — APPOINTMENT (OUTPATIENT)
Dept: GENERAL RADIOLOGY | Facility: HOSPITAL | Age: 67
End: 2022-04-18

## 2022-04-18 VITALS
SYSTOLIC BLOOD PRESSURE: 122 MMHG | BODY MASS INDEX: 30.64 KG/M2 | RESPIRATION RATE: 18 BRPM | WEIGHT: 156.09 LBS | HEIGHT: 60 IN | HEART RATE: 83 BPM | DIASTOLIC BLOOD PRESSURE: 75 MMHG | OXYGEN SATURATION: 97 % | TEMPERATURE: 98.7 F

## 2022-04-18 DIAGNOSIS — J98.8 RESPIRATORY INFECTION: Primary | ICD-10-CM

## 2022-04-18 PROCEDURE — 0202U NFCT DS 22 TRGT SARS-COV-2: CPT | Performed by: EMERGENCY MEDICINE

## 2022-04-18 PROCEDURE — 71046 X-RAY EXAM CHEST 2 VIEWS: CPT

## 2022-04-18 PROCEDURE — 99283 EMERGENCY DEPT VISIT LOW MDM: CPT

## 2022-04-18 PROCEDURE — 63710000001 PREDNISONE PER 5 MG: Performed by: EMERGENCY MEDICINE

## 2022-04-18 RX ORDER — AZITHROMYCIN 250 MG/1
TABLET, FILM COATED ORAL
Qty: 6 TABLET | Refills: 0 | Status: SHIPPED | OUTPATIENT
Start: 2022-04-18 | End: 2022-09-22

## 2022-04-18 RX ORDER — PREDNISONE 50 MG/1
50 TABLET ORAL DAILY
Qty: 5 TABLET | Refills: 0 | Status: SHIPPED | OUTPATIENT
Start: 2022-04-18 | End: 2022-09-22

## 2022-04-18 RX ORDER — BENZONATATE 200 MG/1
200 CAPSULE ORAL 3 TIMES DAILY PRN
Qty: 12 CAPSULE | Refills: 0 | Status: SHIPPED | OUTPATIENT
Start: 2022-04-18 | End: 2022-09-22

## 2022-04-18 RX ADMIN — PREDNISONE 60 MG: 10 TABLET ORAL at 12:45

## 2022-04-18 NOTE — ED PROVIDER NOTES
Subjective   Chief complaint sinus pressure congestion cough body aches    History of present illness this is a 66-year-old female states that last Thursday she started having some sinus pressure stuffy nose drainage and feeling just generally bad aching all over.  She states that that has progressed into a cough with green sputum.  No shortness of breath no chest pain no neck arm jaw pain no dizziness or syncope.  Symptoms ongoing since last Thursday gradually gotten worse moderate degree worse with activity and better with rest diffuse body aches and congestion cough but no shortness of breath or other associated symptoms no high-grade fevers she is had a temperature about 100.  No recent long car ride plane ride immobilization no leg pain or swelling or foreign travels no one in the home with similar illness she lives alone.          Review of Systems   Constitutional: Positive for fatigue and fever. Negative for chills.   HENT: Positive for congestion and sinus pressure.    Respiratory: Positive for cough. Negative for chest tightness and shortness of breath.    Cardiovascular: Negative for chest pain, palpitations and leg swelling.   Gastrointestinal: Negative for abdominal pain and vomiting.   Genitourinary: Negative for difficulty urinating and dysuria.   Musculoskeletal: Positive for myalgias. Negative for neck pain.   Skin: Negative for color change and rash.   Neurological: Negative for dizziness and light-headedness.   Psychiatric/Behavioral: Negative for agitation and behavioral problems.       Past Medical History:   Diagnosis Date   • Anxiety    • Arthritis    • Chronic kidney disease    • Depression    • Diabetes mellitus (CMS/[facility])    • GERD (gastroesophageal reflux disease)    • History of repair of right rotator cuff 09/15/2020   • Hyperlipidemia    • Hypertension    • Low back pain    • MRSA (methicillin resistant Staphylococcus aureus)     after brain surgery   • Stroke (CMS/[facility])    • Suspected  COVID-19 virus infection 2021   • TIA (transient ischemic attack)    • Urine frequency 2021       Allergies   Allergen Reactions   • Sulfa Antibiotics Hives       Past Surgical History:   Procedure Laterality Date   • ANTERIOR CERVICAL DISCECTOMY W/ FUSION N/A 2019    Procedure: CERVICAL DISCECTOMY ANTERIOR WITH FUSION, C4-6;  Surgeon: Ian oFley MD;  Location: [facility];  Service: Neurosurgery   • APPENDECTOMY     • BRAIN SURGERY      x7   • CARPAL TUNNEL RELEASE     •  SECTION     • CHOLECYSTECTOMY OPEN     • HIP SURGERY     • HYSTERECTOMY     • JOINT REPLACEMENT Left     hip   • SHOULDER ARTHROSCOPY     • SHOULDER ARTHROSCOPY W/ ROTATOR CUFF REPAIR Right 9/15/2020    Procedure: SHOULDER ARTHROSCOPY WITH  EXTENSIVE DEBRIDEMENT,  SUBACROMINAL DECOMPRESSION, ROTATOR CUFF REPAIR and bicep tenodesis;  Surgeon: Silviano Gong MD;  Location: [facility];  Service: Orthopedics;  Laterality: Right;   • SHOULDER SURGERY Right 09/15/2020    scope/cuff repair   • TONSILLECTOMY         Family History   Problem Relation Age of Onset   • Cancer Mother    • Heart disease Mother    • Hypertension Mother    • Hyperlipidemia Mother    • Cancer Father    • Diabetes Sister    • Asthma Sister    • Leukemia Brother    • Hypertension Brother        Social History     Socioeconomic History   • Marital status:    Tobacco Use   • Smoking status: Former Smoker     Types: Cigarettes   • Smokeless tobacco: Never Used   Substance and Sexual Activity   • Alcohol use: Yes     Comment: 2 times a month   • Drug use: Yes     Types: Hydrocodone   • Sexual activity: Defer     Prior to Admission medications    Medication Sig Start Date End Date Taking? Authorizing Provider   azithromycin (Zithromax Z-Remberto) 250 MG tablet Take 2 tablets the first day, then 1 tablet daily for 4 days. 22   Car Floyd MD   benzonatate (TESSALON) 200 MG capsule Take 1 capsule by mouth 3 (Three) Times a  Day As Needed for Cough. 4/18/22   Car Floyd MD   cetirizine (zyrTEC) 10 MG tablet Take 1 tablet by mouth Daily for 30 days. 8/11/21 9/10/21  Costa Ochoa MD   Cholecalciferol (VITAMIN D3 PO) Take 2,000 Units by mouth Every Morning.    Gil Lemos MD   CloNIDine (CATAPRES) 0.2 MG tablet Take 0.2 mg by mouth Every Night. 10/2/15   Gil Lemos MD   fenofibrate 160 MG tablet Take 160 mg by mouth Every Evening.    Gil Lemos MD   furosemide (LASIX) 40 MG tablet Take 40 mg by mouth Every Morning. Hold DOS 9/30/15   Gil Lemos MD   gabapentin (NEURONTIN) 600 MG tablet Take 600 mg by mouth 3 (Three) Times a Day. 9/9/19   Gil Lemos MD   glipiZIDE (GLUCOTROL) 5 MG tablet 5 mg 2 (Two) Times a Day Before Meals. Hold DOS 9/30/15   Gil eLmos MD   hydrALAZINE (APRESOLINE) 50 MG tablet Take 50 mg by mouth 3 (Three) Times a Day. Hold DOS 10/2/15   Gil Lemos MD   hydrOXYzine (ATARAX) 50 MG tablet Take 50 mg by mouth every night at bedtime.    Emergency, Nurse AARON Boo   icosapent ethyl (VASCEPA) 1 g capsule capsule Take 1 g by mouth Daily. LD 8/24 9/30/16   Gil Lemos MD   metoprolol succinate XL (TOPROL-XL) 100 MG 24 hr tablet Take 100 mg by mouth 2 (two) times a day. 10/2/15   Gil Lemos MD   Omega-3 Fatty Acids (fish oil) 1000 MG capsule capsule Take 1 capsule by mouth Daily.    Emergency, Nurse Ema RN   omeprazole (priLOSEC) 40 MG capsule TK 1 C PO QAM 11/3/20   Emergency, Nurse Ema RN   predniSONE (DELTASONE) 50 MG tablet Take 1 tablet by mouth Daily. 4/18/22   Car Floyd MD   spironolactone (ALDACTONE) 50 MG tablet Take 50 mg by mouth Every Morning. Hold DOS 9/30/15   Gil Lemos MD   Trulicity 1.5 MG/0.5ML solution pen-injector INJECT 1 PEN INTO THE SKIN WEEKLY 9/17/20   Provider, Gil, MD           Objective   Physical Exam  Constitutional a 66-year-old awake alert no acute distress temperature  98.7 heart rate 78 patient saturations 97% on room air.  HEENT extraocular muscles are intact pupils equal round reactive sclera clear the TMs are clear the mouth is clear there is some postnasal drainage no exudate abscess stridor drooling speech is normal.  No trismus tongue floor mouth is normal.  Neck supple no adenopathy no meningeal signs trachea midline no JVD no bruits lungs clear respiratory rate 18 no retraction no use of accessories heart regular without murmur rub.  Abdomen soft nontender good bowel sounds no peritoneal findings or enlarged liver or spleen.  Extremities pulses are equal throughout upper and lower extremities no edema cords or Homans' sign no evidence of DVT.  Skin warm and dry without rashes or cellulitic change.  Neurologic awake alert orientated x4 no facial asymmetry normal speech without focal weakness.  Procedures           ED Course      Results for orders placed or performed during the hospital encounter of 04/18/22   Respiratory Panel PCR w/COVID-19(SARS-CoV-2) FANI/NARDA/ABRAM/PAD/COR/MAD/DARNELL In-House, NP Swab in UTM/VTM, 3-4 HR TAT - Swab, Nasopharynx    Specimen: Nasopharynx; Swab   Result Value Ref Range    ADENOVIRUS, PCR Not Detected Not Detected    Coronavirus 229E Not Detected Not Detected    Coronavirus HKU1 Not Detected Not Detected    Coronavirus NL63 Not Detected Not Detected    Coronavirus OC43 Not Detected Not Detected    COVID19 Not Detected Not Detected - Ref. Range    Human Metapneumovirus Not Detected Not Detected    Human Rhinovirus/Enterovirus Not Detected Not Detected    Influenza A PCR Not Detected Not Detected    Influenza B PCR Not Detected Not Detected    Parainfluenza Virus 1 Not Detected Not Detected    Parainfluenza Virus 2 Not Detected Not Detected    Parainfluenza Virus 3 Not Detected Not Detected    Parainfluenza Virus 4 Not Detected Not Detected    RSV, PCR Not Detected Not Detected    Bordetella pertussis pcr Not Detected Not Detected    Bordetella  parapertussis PCR Not Detected Not Detected    Chlamydophila pneumoniae PCR Not Detected Not Detected    Mycoplasma pneumo by PCR Not Detected Not Detected     XR Chest 2 View    Result Date: 4/18/2022  No acute cardiac pulmonary findings.  Electronically Signed By-Elena Sotelo MD On:4/18/2022 10:44 AM This report was finalized on [id number] by  Elena Sotelo MD.    Medications   predniSONE (DELTASONE) tablet 60 mg (has no administration in time range)                                                  MDM  Number of Diagnoses or Management Options  Respiratory infection: new and requires workup  Diagnosis management comments: Medical decision making.  Patient had the above exam evaluation.  Respiratory panel was negative.  This is reviewed by me chest x-ray without acute findings reviewed by me and radiology in agreement.  Patient given prednisone 60 mg p.o.  Patient started out Thursday with some sinus congestion and drainage and body aches.  The patient now is coughing up green sputum.  She has several health problems.  Is at risk for pneumonia.  I do not see evidence of suggest acute cardiac issue just based on her history and physical.  No evidence that would suggest a DVT or pulmonary embolism or aortic dissection based on clinical exam and findings.  Sats are 97%.  The patient has respiratory 18 and her heart rates been fine in the 70s.  No other risk that suggest a DVT no leg pain or swelling.  She has good and equal pulses throughout the extremities nontoxic-appearing no evidence of sepsis or meningitis or pneumonia at this point.  This is not a complete list of all possibilities that can make her feel bad and sick.  But she will be discharged home for outpatient management we talked about the findings and what to return for she voiced understanding she was discharged home for outpatient management stable unremarkable ER course.  She was given prednisone 60 mg p.o.       Amount and/or Complexity of  Data Reviewed  Clinical lab tests: reviewed  Tests in the radiology section of CPT®: reviewed    Risk of Complications, Morbidity, and/or Mortality  Presenting problems: moderate  Diagnostic procedures: moderate  Management options: moderate    Patient Progress  Patient progress: stable      Final diagnoses:   Respiratory infection       ED Disposition  ED Disposition     ED Disposition   Discharge    Condition   Stable    Comment   --             Hans Walters MD  [address] [facility] [address]  [phone]    In 2 days           Medication List      New Prescriptions    azithromycin 250 MG tablet  Commonly known as: Zithromax Z-Remberto  Take 2 tablets the first day, then 1 tablet daily for 4 days.     benzonatate 200 MG capsule  Commonly known as: TESSALON  Take 1 capsule by mouth 3 (Three) Times a Day As Needed for Cough.     predniSONE 50 MG tablet  Commonly known as: DELTASONE  Take 1 tablet by mouth Daily.           Where to Get Your Medications      These medications were sent to [facility][id number] - [facility] [place] - [address] [place] [address] AT SEC OF [address] [phone]  - [phone] [address] [place] [address]    Phone: [phone]   · azithromycin 250 MG tablet  · benzonatate 200 MG capsule  · predniSONE 50 MG tablet          Car Floyd MD  04/18/22 1247

## 2022-04-18 NOTE — DISCHARGE INSTRUCTIONS
Rest plenty of fluids Tylenol.  Zithromax prednisone Tessalon Perles sent to pharmacy.  Return for vomiting increasing shortness of breath dizziness passing out chest pain neck arm jaw pain fever no improvement next few days or any other new or worse problems or concerns

## 2022-07-11 ENCOUNTER — APPOINTMENT (OUTPATIENT)
Dept: GENERAL RADIOLOGY | Facility: HOSPITAL | Age: 67
End: 2022-07-11

## 2022-07-11 ENCOUNTER — HOSPITAL ENCOUNTER (EMERGENCY)
Facility: HOSPITAL | Age: 67
Discharge: HOME | End: 2022-07-11
Attending: EMERGENCY MEDICINE | Admitting: EMERGENCY MEDICINE

## 2022-07-11 VITALS
RESPIRATION RATE: 17 BRPM | HEART RATE: 72 BPM | BODY MASS INDEX: 27.68 KG/M2 | SYSTOLIC BLOOD PRESSURE: 122 MMHG | DIASTOLIC BLOOD PRESSURE: 72 MMHG | HEIGHT: 60 IN | OXYGEN SATURATION: 98 % | TEMPERATURE: 99 F | WEIGHT: 141 LBS

## 2022-07-11 DIAGNOSIS — M25.531 ACUTE PAIN OF RIGHT WRIST: Primary | ICD-10-CM

## 2022-07-11 PROCEDURE — 73110 X-RAY EXAM OF WRIST: CPT

## 2022-07-11 PROCEDURE — 99283 EMERGENCY DEPT VISIT LOW MDM: CPT

## 2022-07-11 RX ORDER — IBUPROFEN 400 MG/1
800 TABLET ORAL ONCE
Status: DISCONTINUED | OUTPATIENT
Start: 2022-07-11 | End: 2022-07-11 | Stop reason: HOSPADM

## 2022-07-11 NOTE — ED PROVIDER NOTES
"Subjective   67-year-old female presents with complaint of distal right radial pain.  She is right-hand dominant.  Denies surgery on hand or previous fracture.  She reports that she has had 2 falls in the last 3 months, hyperextending her hand.  She reports that she has had balance difficulties since having Chiari surgery several years ago.  She reports today \"I heard a pop while was making my bed and pulled on the cover\".  Patient reports that she would like to have this evaluated prior to going on vacation Friday.    1. Location: Distal right radius  2. Quality: Shooting  3. Severity: Moderate  4. Worsening factors: Weightbearing  5. Alleviating factors: Rest  6. Onset: 3 months  7. Radiation: Denies  8. Frequency: Intermittent  9. Co-morbidities: Past Medical History:  No date: Anxiety  No date: Arthritis  No date: Chronic kidney disease  No date: Depression  No date: Diabetes mellitus (HCC)  No date: GERD (gastroesophageal reflux disease)  09/15/2020: History of repair of right rotator cuff  No date: Hyperlipidemia  No date: Hypertension  No date: Low back pain  No date: MRSA (methicillin resistant Staphylococcus aureus)      Comment:  after brain surgery  No date: Stroke (HCC)  08/11/2021: Suspected COVID-19 virus infection  No date: TIA (transient ischemic attack)  08/11/2021: Urine frequency  10. Source: Patient            Review of Systems   Musculoskeletal: Positive for arthralgias. Negative for gait problem and myalgias.   Skin: Negative for color change, pallor, rash and wound.   Neurological: Negative for weakness and numbness.   Hematological: Does not bruise/bleed easily.   All other systems reviewed and are negative.      Past Medical History:   Diagnosis Date   • Anxiety    • Arthritis    • Chronic kidney disease    • Depression    • Diabetes mellitus (HCC)    • GERD (gastroesophageal reflux disease)    • History of repair of right rotator cuff 09/15/2020   • Hyperlipidemia    • Hypertension    • Low " back pain    • MRSA (methicillin resistant Staphylococcus aureus)     after brain surgery   • Stroke (HCC)    • Suspected COVID-19 virus infection 2021   • TIA (transient ischemic attack)    • Urine frequency 2021       Allergies   Allergen Reactions   • Sulfa Antibiotics Hives       Past Surgical History:   Procedure Laterality Date   • ANTERIOR CERVICAL DISCECTOMY W/ FUSION N/A 2019    Procedure: CERVICAL DISCECTOMY ANTERIOR WITH FUSION, C4-6;  Surgeon: Ian Foley MD;  Location: [facility] OR;  Service: Neurosurgery   • APPENDECTOMY     • BRAIN SURGERY      x7   • CARPAL TUNNEL RELEASE     •  SECTION     • CHOLECYSTECTOMY OPEN     • HIP SURGERY     • HYSTERECTOMY     • JOINT REPLACEMENT Left     hip   • SHOULDER ARTHROSCOPY     • SHOULDER ARTHROSCOPY W/ ROTATOR CUFF REPAIR Right 9/15/2020    Procedure: SHOULDER ARTHROSCOPY WITH  EXTENSIVE DEBRIDEMENT,  SUBACROMINAL DECOMPRESSION, ROTATOR CUFF REPAIR and bicep tenodesis;  Surgeon: Silviano Gong MD;  Location: [facility];  Service: Orthopedics;  Laterality: Right;   • SHOULDER SURGERY Right 09/15/2020    scope/cuff repair   • TONSILLECTOMY         Family History   Problem Relation Age of Onset   • Cancer Mother    • Heart disease Mother    • Hypertension Mother    • Hyperlipidemia Mother    • Cancer Father    • Diabetes Sister    • Asthma Sister    • Leukemia Brother    • Hypertension Brother        Social History     Socioeconomic History   • Marital status:    Tobacco Use   • Smoking status: Former Smoker     Types: Cigarettes   • Smokeless tobacco: Never Used   Substance and Sexual Activity   • Alcohol use: Yes     Comment: 2 times a month   • Drug use: Yes     Types: Hydrocodone   • Sexual activity: Defer           Objective   Physical Exam  Vitals and nursing note reviewed.   Constitutional:       General: She is awake. She is not in acute distress.     Appearance: Normal appearance. She is  well-developed and normal weight.   Cardiovascular:      Pulses: Normal pulses.           Radial pulses are 2+ on the right side and 2+ on the left side.   Musculoskeletal:         General: Swelling, tenderness and signs of injury present. No deformity. Normal range of motion.      Right wrist: Swelling and tenderness present. No bony tenderness, snuff box tenderness or crepitus. Normal range of motion. Normal pulse.      Left wrist: Normal.        Hands:    Skin:     General: Skin is warm and dry.      Capillary Refill: Capillary refill takes less than 2 seconds.      Coloration: Skin is not pale.   Neurological:      Mental Status: She is alert and oriented to person, place, and time.      Sensory: No sensory deficit.      Motor: No abnormal muscle tone.   Psychiatric:         Mood and Affect: Mood normal.         Behavior: Behavior normal. Behavior is cooperative.         Thought Content: Thought content normal.         Judgment: Judgment normal.         Procedures           ED Course    XR Wrist 3+ View Right    Result Date: 7/11/2022   1.  Degenerative type changes of the right wrist.  No acute bony abnormality.  Electronically Signed By-Reji Alvarez MD On:7/11/2022 6:43 PM This report was finalized on [id number] by  Reji Alvarez MD.    Medications   ibuprofen (ADVIL,MOTRIN) tablet 800 mg (800 mg Oral Not Given 7/11/22 2017)     Labs Reviewed - No data to display                                         MDM  Number of Diagnoses or Management Options  Acute pain of right wrist  Diagnosis management comments: Chart Review: 6/14/22 patient was seen by Neurosurgery for follow up on Chiari Malformation.   Comorbidity: Past Medical History:  No date: Anxiety  No date: Arthritis  No date: Chronic kidney disease  No date: Depression  No date: Diabetes mellitus (HCC)  No date: GERD (gastroesophageal reflux disease)  09/15/2020: History of repair of right rotator cuff  No date: Hyperlipidemia  No date:  Hypertension  No date: Low back pain  No date: MRSA (methicillin resistant Staphylococcus aureus)      Comment:  after brain surgery  No date: Stroke (HCC)  08/11/2021: Suspected COVID-19 virus infection  No date: TIA (transient ischemic attack)  08/11/2021: Urine frequency  Imaging: Was interpreted by physician and reviewed by myself: XR Wrist 3+ View Right   Final Result         1.  Degenerative type changes of the right wrist.  No acute bony    abnormality.         Electronically Signed By-Reji Alvarez MD On:7/11/2022 6:43 PM    This report was finalized on [id number] by  Reji Alvarez MD.    Patient undressed and placed in gown for exam.  Appropriate PPE worn during patient exam. Right wrist: Swelling and tenderness present distal radius. No bony tenderness, snuff box tenderness or crepitus. Normal range of motion. Normal pulse. Ice pack applied.  X-ray of the right wrist obtained with the above findings.  No acute osseous abnormalities, degenerative changes noted.  Patient had a premade cock-up splint applied for comfort.  She reports that she cannot take NSAIDs due to CKD and nephrectomy.  She is given a referral for orthopedics.  She is encouraged to apply ice every 2 hours while awake.    Disposition/Treatment: Discussed results with patient, verbalized understanding.  Discussed reasons to return to the ER, patient verbalized understanding.  Agreeable with plan of care.  Patient was stable upon discharge.       Part of this note may be an electronic transcription/translation of spoken language to printed text using the Dragon Dictation System.          Amount and/or Complexity of Data Reviewed  Tests in the radiology section of CPT®: reviewed    Patient Progress  Patient progress: stable      Final diagnoses:   Acute pain of right wrist       ED Disposition  ED Disposition     ED Disposition   Discharge    Condition   Stable    Comment   --             Hans Walters MD  [address]  [facility] [address]  [phone]    Schedule an appointment as soon as possible for a visit       Silviano Gong MD  [address] [address] [phone]    Schedule an appointment as soon as possible for a visit       [facility] EMERGENCY DEPARTMENT  [facility] [facility] [address] [phone]  Go to   If symptoms worsen         Medication List      No changes were made to your prescriptions during this visit.          Nisa Lobato, APRN  07/11/22 2027

## 2022-07-11 NOTE — DISCHARGE INSTRUCTIONS
Wear splint as directed.  Apply ice every 2 hours while awake, on for 20 minutes.  Schedule follow-up with orthopedist for further management.  Return to the ER for new or worsening symptoms.

## 2022-09-21 RX ORDER — LOSARTAN POTASSIUM 25 MG/1
50 TABLET ORAL 2 TIMES DAILY
COMMUNITY
Start: 2021-12-02 | End: 2022-09-22 | Stop reason: DRUGHIGH

## 2022-09-21 RX ORDER — DULOXETIN HYDROCHLORIDE 60 MG/1
60 CAPSULE, DELAYED RELEASE ORAL DAILY
COMMUNITY
Start: 2022-03-22

## 2022-09-21 RX ORDER — ACETAMINOPHEN 500 MG
1000 TABLET ORAL EVERY 6 HOURS
COMMUNITY
Start: 2022-04-14

## 2022-09-21 RX ORDER — HYDROXYZINE 50 MG/1
50 TABLET, FILM COATED ORAL DAILY
COMMUNITY
End: 2022-09-22

## 2022-09-22 ENCOUNTER — OFFICE VISIT (OUTPATIENT)
Dept: FAMILY MEDICINE CLINIC | Facility: CLINIC | Age: 67
End: 2022-09-22

## 2022-09-22 VITALS
TEMPERATURE: 98.4 F | WEIGHT: 147.6 LBS | HEIGHT: 60 IN | OXYGEN SATURATION: 99 % | BODY MASS INDEX: 28.98 KG/M2 | DIASTOLIC BLOOD PRESSURE: 94 MMHG | SYSTOLIC BLOOD PRESSURE: 168 MMHG | HEART RATE: 54 BPM

## 2022-09-22 DIAGNOSIS — Z78.0 POST-MENOPAUSAL: ICD-10-CM

## 2022-09-22 DIAGNOSIS — K21.9 GASTROESOPHAGEAL REFLUX DISEASE, UNSPECIFIED WHETHER ESOPHAGITIS PRESENT: ICD-10-CM

## 2022-09-22 DIAGNOSIS — N28.1 RENAL CYST: ICD-10-CM

## 2022-09-22 DIAGNOSIS — E78.5 HYPERLIPIDEMIA, UNSPECIFIED HYPERLIPIDEMIA TYPE: ICD-10-CM

## 2022-09-22 DIAGNOSIS — R06.09 DOE (DYSPNEA ON EXERTION): ICD-10-CM

## 2022-09-22 DIAGNOSIS — Z12.11 SCREENING FOR COLON CANCER: ICD-10-CM

## 2022-09-22 DIAGNOSIS — R07.89 CHEST TIGHTNESS: ICD-10-CM

## 2022-09-22 DIAGNOSIS — E11.22 TYPE 2 DIABETES MELLITUS WITH STAGE 2 CHRONIC KIDNEY DISEASE, WITHOUT LONG-TERM CURRENT USE OF INSULIN: ICD-10-CM

## 2022-09-22 DIAGNOSIS — F32.A DEPRESSION, UNSPECIFIED DEPRESSION TYPE: ICD-10-CM

## 2022-09-22 DIAGNOSIS — N18.2 TYPE 2 DIABETES MELLITUS WITH STAGE 2 CHRONIC KIDNEY DISEASE, WITHOUT LONG-TERM CURRENT USE OF INSULIN: ICD-10-CM

## 2022-09-22 DIAGNOSIS — I10 BENIGN ESSENTIAL HTN: ICD-10-CM

## 2022-09-22 DIAGNOSIS — I63.9 CEREBROVASCULAR ACCIDENT (CVA), UNSPECIFIED MECHANISM: ICD-10-CM

## 2022-09-22 DIAGNOSIS — E55.9 VITAMIN D DEFICIENCY: ICD-10-CM

## 2022-09-22 DIAGNOSIS — R22.2 MASS ON BACK: ICD-10-CM

## 2022-09-22 DIAGNOSIS — G43.109 MIGRAINE WITH AURA AND WITHOUT STATUS MIGRAINOSUS, NOT INTRACTABLE: ICD-10-CM

## 2022-09-22 DIAGNOSIS — Z00.01 ENCOUNTER FOR ANNUAL GENERAL MEDICAL EXAMINATION WITH ABNORMAL FINDINGS IN ADULT: Primary | ICD-10-CM

## 2022-09-22 PROBLEM — H25.10 NUCLEAR SCLEROSIS: Status: ACTIVE | Noted: 2018-12-18

## 2022-09-22 PROBLEM — A49.02 METHICILLIN RESISTANT STAPHYLOCOCCUS AUREUS INFECTION: Status: ACTIVE | Noted: 2021-12-01

## 2022-09-22 PROBLEM — IMO0002 CHIARI MALFORMATION: Status: ACTIVE | Noted: 2022-02-02

## 2022-09-22 PROBLEM — M54.40 LUMBAGO WITH SCIATICA: Status: ACTIVE | Noted: 2022-02-02

## 2022-09-22 PROBLEM — Z98.1 ARTHRODESIS STATUS: Status: ACTIVE | Noted: 2022-02-02

## 2022-09-22 LAB
25(OH)D3 SERPL-MCNC: 89.2 NG/ML (ref 30–100)
BASOPHILS # BLD AUTO: 0.03 10*3/MM3 (ref 0–0.2)
BASOPHILS NFR BLD AUTO: 0.4 % (ref 0–1.5)
CHOLEST SERPL-MCNC: NORMAL MG/DL
DEPRECATED RDW RBC AUTO: 38.9 FL (ref 37–54)
EOSINOPHIL # BLD AUTO: 0.09 10*3/MM3 (ref 0–0.4)
EOSINOPHIL NFR BLD AUTO: 1.3 % (ref 0.3–6.2)
ERYTHROCYTE [DISTWIDTH] IN BLOOD BY AUTOMATED COUNT: 12 % (ref 12.3–15.4)
HBA1C MFR BLD: 6.4 % (ref 3.5–5.6)
HCT VFR BLD AUTO: 37.4 % (ref 34–46.6)
HDLC SERPL-MCNC: NORMAL MG/DL
HGB BLD-MCNC: 12.7 G/DL (ref 12–15.9)
IMM GRANULOCYTES # BLD AUTO: 0.01 10*3/MM3 (ref 0–0.05)
IMM GRANULOCYTES NFR BLD AUTO: 0.1 % (ref 0–0.5)
LDLC SERPL CALC-MCNC: NORMAL MG/DL
LDLC/HDLC SERPL: NORMAL {RATIO}
LYMPHOCYTES # BLD AUTO: 2.09 10*3/MM3 (ref 0.7–3.1)
LYMPHOCYTES NFR BLD AUTO: 29.6 % (ref 19.6–45.3)
MAGNESIUM SERPL-MCNC: NORMAL MG/DL
MCH RBC QN AUTO: 29.8 PG (ref 26.6–33)
MCHC RBC AUTO-ENTMCNC: 34 G/DL (ref 31.5–35.7)
MCV RBC AUTO: 87.8 FL (ref 79–97)
MONOCYTES # BLD AUTO: 0.5 10*3/MM3 (ref 0.1–0.9)
MONOCYTES NFR BLD AUTO: 7.1 % (ref 5–12)
NEUTROPHILS NFR BLD AUTO: 4.35 10*3/MM3 (ref 1.7–7)
NEUTROPHILS NFR BLD AUTO: 61.5 % (ref 42.7–76)
NRBC BLD AUTO-RTO: 0 /100 WBC (ref 0–0.2)
PLATELET # BLD AUTO: 215 10*3/MM3 (ref 140–450)
PMV BLD AUTO: 11.6 FL (ref 6–12)
RBC # BLD AUTO: 4.26 10*6/MM3 (ref 3.77–5.28)
TRIGL SERPL-MCNC: NORMAL MG/DL
TSH SERPL DL<=0.05 MIU/L-ACNC: NORMAL M[IU]/L
VIT B12 BLD-MCNC: 320 PG/ML (ref 211–946)
VLDLC SERPL-MCNC: NORMAL MG/DL
WBC NRBC COR # BLD: 7.07 10*3/MM3 (ref 3.4–10.8)

## 2022-09-22 PROCEDURE — 99213 OFFICE O/P EST LOW 20 MIN: CPT | Performed by: PREVENTIVE MEDICINE

## 2022-09-22 PROCEDURE — 1126F AMNT PAIN NOTED NONE PRSNT: CPT | Performed by: PREVENTIVE MEDICINE

## 2022-09-22 PROCEDURE — 85025 COMPLETE CBC W/AUTO DIFF WBC: CPT | Performed by: PREVENTIVE MEDICINE

## 2022-09-22 PROCEDURE — 1170F FXNL STATUS ASSESSED: CPT | Performed by: PREVENTIVE MEDICINE

## 2022-09-22 PROCEDURE — 82306 VITAMIN D 25 HYDROXY: CPT | Performed by: PREVENTIVE MEDICINE

## 2022-09-22 PROCEDURE — 83735 ASSAY OF MAGNESIUM: CPT | Performed by: PREVENTIVE MEDICINE

## 2022-09-22 PROCEDURE — 80061 LIPID PANEL: CPT | Performed by: PREVENTIVE MEDICINE

## 2022-09-22 PROCEDURE — 36415 COLL VENOUS BLD VENIPUNCTURE: CPT | Performed by: PREVENTIVE MEDICINE

## 2022-09-22 PROCEDURE — 84443 ASSAY THYROID STIM HORMONE: CPT | Performed by: PREVENTIVE MEDICINE

## 2022-09-22 PROCEDURE — G0439 PPPS, SUBSEQ VISIT: HCPCS | Performed by: PREVENTIVE MEDICINE

## 2022-09-22 PROCEDURE — 82607 VITAMIN B-12: CPT | Performed by: PREVENTIVE MEDICINE

## 2022-09-22 PROCEDURE — 1159F MED LIST DOCD IN RCRD: CPT | Performed by: PREVENTIVE MEDICINE

## 2022-09-22 PROCEDURE — 83036 HEMOGLOBIN GLYCOSYLATED A1C: CPT | Performed by: PREVENTIVE MEDICINE

## 2022-09-22 RX ORDER — DIPHENHYDRAMINE HCL 25 MG
25 CAPSULE ORAL AS NEEDED
COMMUNITY

## 2022-09-22 RX ORDER — LOSARTAN POTASSIUM 50 MG/1
50 TABLET ORAL 2 TIMES DAILY
Qty: 180 TABLET | Refills: 1
Start: 2022-09-22

## 2022-09-22 NOTE — PROGRESS NOTES
Venipuncture Blood Specimen Collection  Venipuncture performed in RIGHT ARM by Rosa Maria Childs MA with good hemostasis. Patient tolerated the procedure well without complications.    09/22/22   Rosa Maria Childs MA

## 2022-09-22 NOTE — PROGRESS NOTES
The ABCs of the Annual Wellness Visit  Subsequent Medicare Wellness Visit  Patient is also here to establish care and we are following up on multiple chronic health conditions.  She states that she is due to go in for a new  shunt due to her Arnold-Chiari manifestation that has required 7 surgeries.  She has also had cervical discectomies and has gone through many years of opiate rehab and has been free now for 10 years from opiates.  She is pill dependent diabetic and has had no blood sugars over 200 or below 100 she will get an eye exam.  Has had 2 CVAs during her surgeries does have a renal cyst present that check on with her nephrologist.  Has a mass on her back that we are uncertain the etiology of it Sirrine T9 slightly to the right of the spinal column and she will discuss that with the neurosurgeon.  Chief Complaint   Patient presents with   • Establish Care     Patient is not fasting       Subjective    History of Present Illness:  Odalis Solo is a 67 y.o. female who presents for a Subsequent Medicare Wellness Visit.    The following portions of the patient's history were reviewed and   updated as appropriate: allergies, current medications, past family history, past medical history, past social history, past surgical history and problem list.    Compared to one year ago, the patient feels her physical   health is worse.    Compared to one year ago, the patient feels her mental   health is better.    Recent Hospitalizations:  She was not admitted to the hospital during the last year.       Current Medical Providers:  Patient Care Team:  Aubree Alas MD as PCP - General (Family Medicine)  Ian Foley MD (Inactive) as Consulting Physician (Neurosurgery)    Outpatient Medications Prior to Visit   Medication Sig Dispense Refill   • acetaminophen (TYLENOL) 500 MG tablet 500 mg.     • ASPIRIN 81 PO   0 Refill(s)     • diphenhydrAMINE (Benadryl Allergy) 25 mg capsule Take 25 mg by mouth Every  6 (Six) Hours As Needed for Itching.     • DULoxetine (CYMBALTA) 60 MG capsule      • fenofibrate 160 MG tablet Take 160 mg by mouth Every Evening.     • furosemide (LASIX) 40 MG tablet Take 40 mg by mouth Every Morning. Hold DOS     • gabapentin (NEURONTIN) 600 MG tablet Take 600 mg by mouth 3 (Three) Times a Day.  0   • glipiZIDE (GLUCOTROL) 5 MG tablet 5 mg 2 (Two) Times a Day Before Meals. Hold DOS     • icosapent ethyl (VASCEPA) 1 g capsule capsule Take 1 g by mouth Daily. LD 8/24     • metoprolol succinate XL (TOPROL-XL) 100 MG 24 hr tablet Take 100 mg by mouth 2 (two) times a day.     • Omega-3 Fatty Acids (fish oil) 1000 MG capsule capsule Take 1 capsule by mouth Daily.     • losartan (COZAAR) 25 MG tablet Take 50 mg by mouth 2 (Two) Times a Day.     • Cholecalciferol (VITAMIN D3 PO) Take 2,000 Units by mouth Every Morning.     • azithromycin (Zithromax Z-Remberto) 250 MG tablet Take 2 tablets the first day, then 1 tablet daily for 4 days. 6 tablet 0   • Bempedoic Acid-Ezetimibe 180-10 MG tablet Take 1 tablet by mouth Daily.     • benzonatate (TESSALON) 200 MG capsule Take 1 capsule by mouth 3 (Three) Times a Day As Needed for Cough. 12 capsule 0   • cetirizine (zyrTEC) 10 MG tablet Take 1 tablet by mouth Daily for 30 days. 30 tablet 0   • CloNIDine (CATAPRES) 0.2 MG tablet Take 0.2 mg by mouth Every Night.     • hydrALAZINE (APRESOLINE) 50 MG tablet Take 50 mg by mouth 3 (Three) Times a Day. Hold DOS     • hydrOXYzine (ATARAX) 50 MG tablet Take 50 mg by mouth every night at bedtime.     • hydrOXYzine (ATARAX) 50 MG tablet Take 50 mg by mouth Daily.     • omeprazole (priLOSEC) 40 MG capsule TK 1 C PO QAM     • predniSONE (DELTASONE) 50 MG tablet Take 1 tablet by mouth Daily. 5 tablet 0   • spironolactone (ALDACTONE) 50 MG tablet Take 50 mg by mouth Every Morning. Hold DOS     • Trulicity 1.5 MG/0.5ML solution pen-injector INJECT 1 PEN INTO THE SKIN WEEKLY       No facility-administered medications prior to visit.        No opioid medication identified on active medication list. I have reviewed chart for other potential  high risk medication/s and harmful drug interactions in the elderly.          Aspirin is on active medication list. Aspirin use is indicated based on review of current medical condition/s. Pros and cons of this therapy have been discussed today. Benefits of this medication outweigh potential harm.  Patient has been encouraged to continue taking this medication.  .      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   • DDD (degenerative disc disease), cervical   • Family history of diabetes mellitus   • Hyperlipidemia   • Leg length discrepancy   • Status post hip replacement   • Stroke (HCC)   • Tear of acetabular labrum   • Type 2 diabetes mellitus (HCC)   • Lumbar radiculopathy   • Cervical radiculopathy   • Cervical spondylosis   • Complete tear of right rotator cuff   • Urine frequency   • Suspected COVID-19 virus infection   • Ataxic gait   • Chest tightness   • Chiari malformation   • MONGE (dyspnea on exertion)   • Hypermetropia   • Lumbago with sciatica   • Mass on back   • Migraine with aura   • Myalgia   • Nuclear sclerosis   • Presbyopia   • Renal cyst   • Arthrodesis status   • Anxiety   • Depression   • GERD (gastroesophageal reflux disease)     Advance Care Planning  Advance Directive is on file.  ACP discussion was held with the patient during this visit. Patient has an advance directive in EMR which is still valid.     Review of Systems   Respiratory: Positive for cough.    Musculoskeletal: Positive for arthralgias, back pain, gait problem, myalgias, neck pain and neck stiffness.        Objective    Vitals:    09/22/22 1258 09/22/22 1306 09/22/22 1307   BP: 177/96 (!) 168/101 168/94   BP Location: Right arm Left arm Left  "arm   Patient Position: Sitting Sitting Standing   Cuff Size: Adult Adult Adult   Pulse: 54     Temp: 98.4 °F (36.9 °C)     TempSrc: Temporal     SpO2: 99%     Weight: 67 kg (147 lb 9.6 oz)     Height: 152.4 cm (60\")     PainSc: 0-No pain       Estimated body mass index is 28.83 kg/m² as calculated from the following:    Height as of this encounter: 152.4 cm (60\").    Weight as of this encounter: 67 kg (147 lb 9.6 oz).    BMI is >= 25 and <30. (Overweight) The following options were offered after discussion;: exercise counseling/recommendations and nutrition counseling/recommendations      Does the patient have evidence of cognitive impairment? No    Physical Exam  Vitals reviewed.   Constitutional:       General: She is not in acute distress.     Appearance: Normal appearance. She is well-developed. She is not ill-appearing or toxic-appearing.   HENT:      Head: Normocephalic and atraumatic.      Right Ear: Tympanic membrane, ear canal and external ear normal.      Left Ear: Tympanic membrane, ear canal and external ear normal.      Nose: Nose normal.      Mouth/Throat:      Pharynx: No posterior oropharyngeal erythema.   Eyes:      Extraocular Movements: Extraocular movements intact.      Conjunctiva/sclera: Conjunctivae normal.      Pupils: Pupils are equal, round, and reactive to light.   Cardiovascular:      Rate and Rhythm: Normal rate and regular rhythm.      Pulses:           Dorsalis pedis pulses are 1+ on the right side and 1+ on the left side.        Posterior tibial pulses are 1+ on the right side and 1+ on the left side.      Heart sounds: Normal heart sounds.   Pulmonary:      Effort: Pulmonary effort is normal.      Comments: Decreased breath sounds bilaterally  Abdominal:      General: Bowel sounds are normal. There is no distension.      Palpations: Abdomen is soft. There is no mass.      Tenderness: There is no abdominal tenderness.   Musculoskeletal:         General: Tenderness and deformity " present.      Cervical back: Neck supple.   Feet:      Right foot:      Protective Sensation: 10 sites tested. 9 sites sensed.      Skin integrity: Callus present.      Toenail Condition: Right toenails are normal.      Left foot:      Protective Sensation: 10 sites tested. 9 sites sensed.      Skin integrity: Callus present.      Toenail Condition: Left toenails are normal.      Comments: Diabetic Foot Exam Performed and Monofilament Test Performed    Skin:     General: Skin is warm.      Findings: Bruising present.   Neurological:      General: No focal deficit present.      Mental Status: She is alert and oriented to person, place, and time.   Psychiatric:         Mood and Affect: Mood normal.         Behavior: Behavior normal.       Lab Results   Component Value Date    TRIG 277 (H) 2022    HDL 42 2022     (H) 2022    VLDL 48 (H) 2022    HGBA1C 6.4 (H) 2022            HEALTH RISK ASSESSMENT    Smoking Status:  Social History     Tobacco Use   Smoking Status Former Smoker   • Packs/day: 0.25   • Years: 10.00   • Pack years: 2.50   • Types: Cigarettes   • Quit date: 1997   • Years since quittin.0   Smokeless Tobacco Never Used     Alcohol Consumption:  Social History     Substance and Sexual Activity   Alcohol Use Yes    Comment: 2 times a month     Fall Risk Screen:    STEADI Fall Risk Assessment was completed, and patient is at HIGH risk for falls. Assessment completed on:2022    Depression Screening:  PHQ-2/PHQ-9 Depression Screening 2022   Little Interest or Pleasure in Doing Things 0-->not at all   Feeling Down, Depressed or Hopeless 0-->not at all   PHQ-9: Brief Depression Severity Measure Score 0       Health Habits and Functional and Cognitive Screening:  Functional & Cognitive Status 2022   Do you have difficulty preparing food and eating? No   Do you have difficulty bathing yourself, getting dressed or grooming yourself? No   Do you have  difficulty using the toilet? No   Do you have difficulty moving around from place to place? No   Do you have trouble with steps or getting out of a bed or a chair? No   Current Diet Unhealthy Diet   Dental Exam Up to date   Eye Exam Not up to date   Exercise (times per week) 3 times per week   Current Exercises Include Walking;House Cleaning   Do you need help using the phone?  No   Are you deaf or do you have serious difficulty hearing?  No   Do you need help with transportation? No   Do you need help shopping? No   Do you need help preparing meals?  No   Do you need help with housework?  No   Do you need help with laundry? No   Do you need help taking your medications? No   Do you need help managing money? No   Do you ever drive or ride in a car without wearing a seat belt? No   Have you felt unusual stress, anger or loneliness in the last month? No   Who do you live with? Alone   If you need help, do you have trouble finding someone available to you? No   Have you been bothered in the last four weeks by sexual problems? No   Do you have difficulty concentrating, remembering or making decisions? Yes       Age-appropriate Screening Schedule:  Refer to the list below for future screening recommendations based on patient's age, sex and/or medical conditions. Orders for these recommended tests are listed in the plan section. The patient has been provided with a written plan.    Health Maintenance   Topic Date Due   • URINE MICROALBUMIN  Never done   • DXA SCAN  Never done   • DIABETIC EYE EXAM  04/22/2022   • INFLUENZA VACCINE  08/01/2022   • HEMOGLOBIN A1C  03/22/2023   • DIABETIC FOOT EXAM  09/22/2023   • LIPID PANEL  09/28/2023   • MAMMOGRAM  11/12/2023   • TDAP/TD VACCINES (2 - Td or Tdap) 09/29/2028   • ZOSTER VACCINE  Completed   • PAP SMEAR  Discontinued              Assessment & Plan   CMS Preventative Services Quick Reference  Risk Factors Identified During Encounter  Obesity/Overweight   The above  risks/problems have been discussed with the patient.  Follow up actions/plans if indicated are seen below in the Assessment/Plan Section.  Pertinent information has been shared with the patient in the After Visit Summary.    Diagnoses and all orders for this visit:    1. Encounter for annual general medical examination with abnormal findings in adult (Primary)    2. Post-menopausal  Comments:  Patient will get DEXA scan..    3. Screening for colon cancer  Comments:  Patient feels as though she is not due for colonoscopy till next year we will get the records [facility].    4. Benign essential HTN  Comments:  Not controlled -will see renal soon  Orders:  -     CBC Auto Differential; Future  -     CBC Auto Differential    5. Chest tightness  Comments:  At times-normal for her-stress echo ok 1.5 years ago    6. Depression, unspecified depression type  Comments:  Controlled with Duloxitine    7. MONGE (dyspnea on exertion)  Comments:  Unchanged    8. Gastroesophageal reflux disease, unspecified whether esophagitis present  Comments:  Gone and not on omeprazole  Orders:  -     Magnesium; Future  -     Magnesium    9. Hyperlipidemia, unspecified hyperlipidemia type  Comments:  Watching sat fats  Orders:  -     Lipid Panel; Future  -     Lipid Panel    10. Mass on back  Comments:  mid thoracicoff to Right-hard/will ask neurosurgeon    11. Migraine with aura and without status migrainosus, not intractable  Comments:  Whole family has.  Got addicted due to pain meds    12. Renal cyst  Comments:  Will have nephrologist send records    13. Cerebrovascular accident (CVA), unspecified mechanism (HCC)  Comments:  2011 during surgery.  Speech still affected and concentration and memory and gait is poor    14. Type 2 diabetes mellitus with stage 2 chronic kidney disease, without long-term current use of insulin (HCC)  Comments:  BS below 100.  None above 200  Orders:  -     Comprehensive Metabolic Panel; Future  -     Vitamin B12;  Future  -     Hemoglobin A1c; Future  -     TSH; Future  -     Ambulatory Referral to Diabetic Education  -     Cancel: Comprehensive Metabolic Panel  -     Vitamin B12  -     Hemoglobin A1c  -     TSH    15. Vitamin D deficiency  Comments:  Takes daily  Orders:  -     Vitamin D 25 Hydroxy; Future  -     Vitamin D 25 Hydroxy    Other orders  -     losartan (Cozaar) 50 MG tablet; Take 1 tablet by mouth 2 (Two) Times a Day.  Dispense: 180 tablet; Refill: 1        Follow Up:   Return in about 3 months (around 12/22/2022).     An After Visit Summary and PPPS were made available to the patient.

## 2022-09-23 ENCOUNTER — TELEPHONE (OUTPATIENT)
Dept: FAMILY MEDICINE CLINIC | Facility: CLINIC | Age: 67
End: 2022-09-23

## 2022-09-23 NOTE — TELEPHONE ENCOUNTER
----- Message from Lizzy Taylor sent at 9/23/2022  8:53 AM EDT -----  Please call patient and apologize and see how we can prevent   ----- Message -----  From: Aubree Alas MD  Sent: 9/23/2022   7:30 AM EDT  To: Lizzy Taylor    Please call patient and apologize and see how we can prevent  ----- Message -----  From: Lab, Background User  Sent: 9/22/2022   5:17 PM EDT  To: Aubree Alas MD

## 2022-09-26 ENCOUNTER — TELEPHONE (OUTPATIENT)
Dept: FAMILY MEDICINE CLINIC | Facility: CLINIC | Age: 67
End: 2022-09-26

## 2022-09-28 ENCOUNTER — CLINICAL SUPPORT (OUTPATIENT)
Dept: FAMILY MEDICINE CLINIC | Facility: CLINIC | Age: 67
End: 2022-09-28

## 2022-09-28 DIAGNOSIS — G43.109 MIGRAINE WITH AURA AND WITHOUT STATUS MIGRAINOSUS, NOT INTRACTABLE: ICD-10-CM

## 2022-09-28 DIAGNOSIS — Z78.0 POST-MENOPAUSAL: ICD-10-CM

## 2022-09-28 DIAGNOSIS — E78.5 HYPERLIPIDEMIA, UNSPECIFIED HYPERLIPIDEMIA TYPE: ICD-10-CM

## 2022-09-28 LAB
CHOLEST SERPL-MCNC: 208 MG/DL (ref 0–200)
HDLC SERPL-MCNC: 42 MG/DL (ref 40–60)
LDLC SERPL CALC-MCNC: 118 MG/DL (ref 0–100)
LDLC/HDLC SERPL: 2.63 {RATIO}
MAGNESIUM SERPL-MCNC: 1.9 MG/DL (ref 1.6–2.4)
TRIGL SERPL-MCNC: 277 MG/DL (ref 0–150)
TSH SERPL DL<=0.05 MIU/L-ACNC: 1.19 UIU/ML (ref 0.27–4.2)
VLDLC SERPL-MCNC: 48 MG/DL (ref 5–40)

## 2022-09-28 PROCEDURE — 80061 LIPID PANEL: CPT | Performed by: PREVENTIVE MEDICINE

## 2022-09-28 PROCEDURE — 36415 COLL VENOUS BLD VENIPUNCTURE: CPT | Performed by: PREVENTIVE MEDICINE

## 2022-09-28 PROCEDURE — 84443 ASSAY THYROID STIM HORMONE: CPT | Performed by: PREVENTIVE MEDICINE

## 2022-09-28 PROCEDURE — 83735 ASSAY OF MAGNESIUM: CPT | Performed by: PREVENTIVE MEDICINE

## 2022-09-28 NOTE — PROGRESS NOTES
Venipuncture Blood Specimen Collection  Venipuncture performed in right arm by Rosa Maria Childs MA with good hemostasis. Patient tolerated the procedure well without complications.   09/28/22   MARIO Lancaster MD

## 2022-09-29 ENCOUNTER — TELEPHONE (OUTPATIENT)
Dept: FAMILY MEDICINE CLINIC | Facility: CLINIC | Age: 67
End: 2022-09-29

## 2022-09-29 NOTE — TELEPHONE ENCOUNTER
HUB TO READ   ----- Message from Aubree Alas MD sent at 9/29/2022  9:31 AM EDT -----  Total bad cholesterol were both elevated can she do more with diet and exercise which she consider a statin please let me know.

## 2022-09-29 NOTE — PROGRESS NOTES
Total bad cholesterol were both elevated can she do more with diet and exercise which she consider a statin please let me know.

## 2022-09-30 RX ORDER — PRAVASTATIN SODIUM 20 MG
20 TABLET ORAL DAILY
Qty: 90 TABLET | Refills: 1 | Status: SHIPPED | OUTPATIENT
Start: 2022-09-30 | End: 2022-10-18 | Stop reason: SDDI

## 2022-09-30 NOTE — TELEPHONE ENCOUNTER
PATIENT NOTIFIED. SHE HAD A STATIN IN THE PAST AND CANNOT REMEMBER WHAT IT WAS - BUT GAVE HER REALLY BAD MUSCLE CRAMPS. SHE WILL TRY ONE AND HOPE IT DOES NOT DO THE SAME.

## 2022-10-03 RX ORDER — GLIPIZIDE 5 MG/1
TABLET ORAL
Qty: 180 TABLET | Refills: 1 | Status: SHIPPED | OUTPATIENT
Start: 2022-10-03 | End: 2023-03-24

## 2022-10-03 RX ORDER — GLIPIZIDE 5 MG/1
TABLET ORAL
Qty: 180 TABLET | Refills: 1 | Status: SHIPPED | OUTPATIENT
Start: 2022-10-03 | End: 2022-12-06 | Stop reason: SDUPTHER

## 2022-10-18 ENCOUNTER — TELEPHONE (OUTPATIENT)
Dept: FAMILY MEDICINE CLINIC | Facility: CLINIC | Age: 67
End: 2022-10-18

## 2022-10-18 NOTE — TELEPHONE ENCOUNTER
Caller: Odalis Solo    Relationship: Self    Best call back number: [phone]     What was the call regarding: PATIENT STATED THAT AFTER TAKING THE PRAVASTATIN, SHE EXPERIENCED MUSCLE CRAMPS AND ACHES. PATIENT STATED THAT SHE TOOK IT BEFORE AND NOW SHE REMEMBERS WHY SHE CANNOT TAKE IT, PATIENT CANNOT TAKE ANY STATINS. PATIENT STATED THAT SHE WOULD LIKE A DIFFERENT MEDICATION SENT IN TO TAKE ITS PLACE. PATIENT ALSO STATED SHE IS HAVING BRAIN SURGERY NEXT Wednesday ON 10/26/2022. PLEASE ADVISE.     PATIENT'S PHARMACY:  [id number] - [place] [address] [address] - [phone] [facility] [phone] FX      Do you require a callback: YES

## 2022-10-18 NOTE — TELEPHONE ENCOUNTER
Repatha sent.  If it is approved can have pharmacist show how to use or she can come in here for help.  If insurance won't approve, see if we can get neurosurgeon to get approved or neurologist-whomever treated her stroke

## 2022-10-18 NOTE — TELEPHONE ENCOUNTER
Since she has had a couple of strokes-insurance may pay for an injection she takes every 2 weeks to lower cholesterol if she is willing-let me know and we'll try to get approved.

## 2022-10-21 ENCOUNTER — TELEPHONE (OUTPATIENT)
Dept: FAMILY MEDICINE CLINIC | Facility: CLINIC | Age: 67
End: 2022-10-21

## 2022-10-21 NOTE — TELEPHONE ENCOUNTER
Caller: [facility] #[id number] - [facility] [place], [place] - [address]  - [phone] [facility] [phone] FX    Relationship: Pharmacy    Best call back number: [phone]    What is the best time to reach you: ANYTIME    Who are you requesting to speak with (clinical staff, provider,  specific staff member): CLINICAL STAFF  Do you know the name of the person who called: LEIDY  What was the call regarding:LEIDY WITH [facility] WAS CALLING CHECKING ON A PRE-AUTH FOR REPATHA. PLEASE CALL  Do you require a callback: YES

## 2022-10-27 ENCOUNTER — TELEPHONE (OUTPATIENT)
Dept: FAMILY MEDICINE CLINIC | Facility: CLINIC | Age: 67
End: 2022-10-27

## 2022-10-27 RX ORDER — ALIROCUMAB 75 MG/ML
75 INJECTION, SOLUTION SUBCUTANEOUS
Qty: 2.24 ML | Refills: 3 | Status: SHIPPED | OUTPATIENT
Start: 2022-10-27 | End: 2023-02-05

## 2022-10-27 NOTE — TELEPHONE ENCOUNTER
Caller: [facility] #[id number] - [place], [place] [id number] [facility] [address] - [phone] [place] [phone] FX    Relationship: Pharmacy    Best call back number: [phone]    Requested Prescriptions:   Requested Prescriptions     Pending Prescriptions Disp Refills   • Evolocumab (REPATHA) solution prefilled syringe injection 1 mL 4     Sig: Inject 1 mL under the skin into the appropriate area as directed Every 14 (Fourteen) Days.        Pharmacy where request should be sent: [facility] #[id number] - [place], [place] - [facility] [facility] [address] [facility] [address] - [phone] [place] [phone] FX  [phone]     Additional details provided by patient: YOUSUF CALLED FROM [employer] TO LET DR PAGE KNOW THAT THE PA HAS BEEN DENIED.      DR BLANCHARD CAN FILE A REPEAL ON THE DENIAL OR PHARMACY IS SUGGESTING ANOTHER MEDICATION THAT IS COVERED.    NDC# [id number]  PRALUENT 75 MG    Jennifer Gonzalez Rep   10/27/22 11:57 EDT

## 2022-10-28 ENCOUNTER — TELEPHONE (OUTPATIENT)
Dept: FAMILY MEDICINE CLINIC | Facility: CLINIC | Age: 67
End: 2022-10-28

## 2022-12-05 PROBLEM — R53.1 WEAKNESS: Status: ACTIVE | Noted: 2022-12-05

## 2022-12-05 PROBLEM — R51.9 HEADACHE: Status: ACTIVE | Noted: 2022-12-05

## 2022-12-05 NOTE — PATIENT INSTRUCTIONS
Health Maintenance Due   Topic Date Due    URINE MICROALBUMIN  Never done    DXA SCAN  Never done    COLORECTAL CANCER SCREENING  Never done    DIABETIC EYE EXAM  04/22/2022    COVID-19 Vaccine (5 - Booster for Moderna series) 07/14/2022

## 2022-12-06 ENCOUNTER — OFFICE VISIT (OUTPATIENT)
Dept: FAMILY MEDICINE CLINIC | Facility: CLINIC | Age: 67
End: 2022-12-06

## 2022-12-06 VITALS
DIASTOLIC BLOOD PRESSURE: 76 MMHG | HEIGHT: 60 IN | SYSTOLIC BLOOD PRESSURE: 127 MMHG | BODY MASS INDEX: 30.04 KG/M2 | HEART RATE: 66 BPM | WEIGHT: 153 LBS | TEMPERATURE: 98.6 F | OXYGEN SATURATION: 97 %

## 2022-12-06 DIAGNOSIS — I10 BENIGN ESSENTIAL HTN: ICD-10-CM

## 2022-12-06 DIAGNOSIS — R30.0 DYSURIA: ICD-10-CM

## 2022-12-06 DIAGNOSIS — G89.29 CHRONIC PAIN OF RIGHT KNEE: ICD-10-CM

## 2022-12-06 DIAGNOSIS — Z78.0 POST-MENOPAUSAL: Primary | ICD-10-CM

## 2022-12-06 DIAGNOSIS — F32.A DEPRESSION, UNSPECIFIED DEPRESSION TYPE: ICD-10-CM

## 2022-12-06 DIAGNOSIS — N18.2 TYPE 2 DIABETES MELLITUS WITH STAGE 2 CHRONIC KIDNEY DISEASE, WITHOUT LONG-TERM CURRENT USE OF INSULIN: ICD-10-CM

## 2022-12-06 DIAGNOSIS — N39.0 URINARY TRACT INFECTION WITHOUT HEMATURIA, SITE UNSPECIFIED: ICD-10-CM

## 2022-12-06 DIAGNOSIS — E11.22 TYPE 2 DIABETES MELLITUS WITH STAGE 2 CHRONIC KIDNEY DISEASE, WITHOUT LONG-TERM CURRENT USE OF INSULIN: ICD-10-CM

## 2022-12-06 DIAGNOSIS — M25.561 CHRONIC PAIN OF RIGHT KNEE: ICD-10-CM

## 2022-12-06 DIAGNOSIS — Z12.11 SCREENING FOR COLON CANCER: ICD-10-CM

## 2022-12-06 LAB
BILIRUB BLD-MCNC: NEGATIVE MG/DL
CLARITY, POC: CLEAR
COLOR UR: YELLOW
EXPIRATION DATE: ABNORMAL
GLUCOSE UR STRIP-MCNC: NEGATIVE MG/DL
KETONES UR QL: NEGATIVE
LEUKOCYTE EST, POC: ABNORMAL
Lab: ABNORMAL
NITRITE UR-MCNC: NEGATIVE MG/ML
PH UR: 5.5 [PH] (ref 5–8)
PROT UR STRIP-MCNC: ABNORMAL MG/DL
RBC # UR STRIP: NEGATIVE /UL
SP GR UR: 1.03 (ref 1–1.03)
UROBILINOGEN UR QL: ABNORMAL

## 2022-12-06 PROCEDURE — 87147 CULTURE TYPE IMMUNOLOGIC: CPT | Performed by: PREVENTIVE MEDICINE

## 2022-12-06 PROCEDURE — 87086 URINE CULTURE/COLONY COUNT: CPT | Performed by: PREVENTIVE MEDICINE

## 2022-12-06 PROCEDURE — 81003 URINALYSIS AUTO W/O SCOPE: CPT | Performed by: PREVENTIVE MEDICINE

## 2022-12-06 PROCEDURE — 99214 OFFICE O/P EST MOD 30 MIN: CPT | Performed by: PREVENTIVE MEDICINE

## 2022-12-06 RX ORDER — NITROFURANTOIN 25; 75 MG/1; MG/1
100 CAPSULE ORAL 2 TIMES DAILY
Qty: 14 CAPSULE | Refills: 0 | Status: SHIPPED | OUTPATIENT
Start: 2022-12-06 | End: 2023-01-26

## 2022-12-07 DIAGNOSIS — N39.0 URINARY TRACT INFECTION WITHOUT HEMATURIA, SITE UNSPECIFIED: Primary | ICD-10-CM

## 2022-12-07 LAB — BACTERIA SPEC AEROBE CULT: ABNORMAL

## 2022-12-07 NOTE — PROGRESS NOTES
"Subjective   Odalis Solo is a 67 y.o. female presents for   Chief Complaint   Patient presents with   • Urinary Tract Infection   • Back Pain   • Knee Pain     Torn meniscus 5 years ago hasn't bothered her, but the last couple weeks has been excruiating   Patient presents today for what she thinks is a urinary tract infection she is having bilateral lower abdominal discomfort without flank pain fever or chills.  She has noticed some discomfort urinating and there was signs of infection in her urine today she will be begun on Macrobid while culture is pending.  She is also complaining of severe right knee pain and is due to have this replaced after the first of the year.  She feels as though the gabapentin twice a day has been helpful for her and we will allow her to continue that dose.  It should be noted that bowel movements have been normal and she has had no weight loss.    Health Maintenance Due   Topic Date Due   • URINE MICROALBUMIN  Never done   • DXA SCAN  Never done   • COLORECTAL CANCER SCREENING  Never done   • DIABETIC EYE EXAM  04/22/2022   • COVID-19 Vaccine (5 - Booster for Moderna series) 07/14/2022       History of Present Illness     Vitals:    12/06/22 1548 12/06/22 1558 12/06/22 1559   BP: 155/79 147/83 127/76   BP Location: Left arm Right arm Right arm   Patient Position: Sitting Sitting Standing   Cuff Size: Adult Adult Adult   Pulse: 66     Temp: 98.6 °F (37 °C)     TempSrc: Temporal     SpO2: 97%     Weight: 69.4 kg (153 lb)     Height: 152.4 cm (60\")       Body mass index is 29.88 kg/m².    Current Outpatient Medications on File Prior to Visit   Medication Sig Dispense Refill   • acetaminophen (TYLENOL) 500 MG tablet 500 mg.     • Alirocumab (Praluent) 75 MG/ML solution auto-injector Inject 1 mL under the skin into the appropriate area as directed Every 14 (Fourteen) Days. 2.24 mL 3   • ASPIRIN 81 PO   0 Refill(s)     • diphenhydrAMINE (BENADRYL) 25 mg capsule Take 25 mg by mouth Every 6 " (Six) Hours As Needed for Itching.     • DULoxetine (CYMBALTA) 60 MG capsule      • furosemide (LASIX) 40 MG tablet Take 40 mg by mouth Every Morning. Hold DOS     • gabapentin (NEURONTIN) 600 MG tablet Take 600 mg by mouth 2 (Two) Times a Day.  0   • glipizide (GLUCOTROL) 5 MG tablet TAKE 1 TABLET BY MOUTH TWICE DAILY 180 tablet 1   • losartan (Cozaar) 50 MG tablet Take 1 tablet by mouth 2 (Two) Times a Day. 180 tablet 1   • metoprolol succinate XL (TOPROL-XL) 100 MG 24 hr tablet Take 100 mg by mouth 2 (two) times a day.     • Omega-3 Fatty Acids (fish oil) 1000 MG capsule capsule Take 1 capsule by mouth Daily.     • [DISCONTINUED] Cholecalciferol (VITAMIN D3 PO) Take 2,000 Units by mouth Every Morning.     • [DISCONTINUED] glipizide (GLUCOTROL) 5 MG tablet TAKE 1 TABLET BY MOUTH TWICE DAILY 180 tablet 1     No current facility-administered medications on file prior to visit.       The following portions of the patient's history were reviewed and updated as appropriate: allergies, current medications, past family history, past medical history, past social history, past surgical history and problem list.    Review of Systems   Gastrointestinal: Positive for abdominal pain.   Musculoskeletal: Positive for arthralgias.       Objective   Physical Exam  Vitals reviewed.   Constitutional:       General: She is not in acute distress.     Appearance: Normal appearance. She is well-developed. She is not ill-appearing or toxic-appearing.   HENT:      Head: Normocephalic and atraumatic.      Nose: Nose normal.   Eyes:      Extraocular Movements: Extraocular movements intact.      Conjunctiva/sclera: Conjunctivae normal.      Pupils: Pupils are equal, round, and reactive to light.   Cardiovascular:      Rate and Rhythm: Normal rate.   Pulmonary:      Effort: Pulmonary effort is normal.   Abdominal:      General: Abdomen is flat. Bowel sounds are normal. There is no distension.      Palpations: Abdomen is soft. There is no  mass.      Tenderness: There is abdominal tenderness. There is no right CVA tenderness or left CVA tenderness.   Musculoskeletal:         General: Tenderness present.      Comments: Crepitus with right knee flexion and extension.   Skin:     Comments: Short hair on the right scalp due to recent shunt replacement.   Neurological:      Mental Status: She is alert and oriented to person, place, and time.   Psychiatric:         Mood and Affect: Mood normal.         Behavior: Behavior normal.       PHQ-9 Total Score:      Assessment & Plan   Diagnoses and all orders for this visit:    1. Post-menopausal (Primary)    2. Screening for colon cancer  -     Cologuard - Stool, Per Rectum; Future    3. Dysuria  Comments:  Patient was begun on Macrobid today while culture is pending.    4. Benign essential HTN    5. Type 2 diabetes mellitus with stage 2 chronic kidney disease, without long-term current use of insulin (HCC)  Comments:  Patient says blood sugars have been under fair control.    6. Depression, unspecified depression type  Comments:  Mood seems to be under fair control no HI or SI    7. Urinary tract infection without hematuria, site unspecified  Comments:  Patient has had bilateral lower abdominal discomfort and some slight dysuria.  No fever chills or flank pain.  Urine showed leukocytes culture pending.  Orders:  -     POC Urinalysis Dipstick, Automated  -     Urine Culture - Urine, Urine, Clean Catch; Future  -     Urine Culture - Urine, Urine, Clean Catch    8. Chronic pain of right knee  Comments:  Gabapentin increased to twice daily.    Other orders  -     nitrofurantoin, macrocrystal-monohydrate, (Macrobid) 100 MG capsule; Take 1 capsule by mouth 2 (Two) Times a Day.  Dispense: 14 capsule; Refill: 0        Patient Instructions     Health Maintenance Due   Topic Date Due   • URINE MICROALBUMIN  Never done   • DXA SCAN  Never done   • COLORECTAL CANCER SCREENING  Never done   • DIABETIC EYE EXAM  04/22/2022    • COVID-19 Vaccine (5 - Booster for Moderna series) 07/14/2022

## 2022-12-07 NOTE — PROGRESS NOTES
Should switch to Penicillin if still having symptoms-otherwise finish meds and 3 days after tops repeat urine culture-order placed

## 2022-12-09 ENCOUNTER — TELEPHONE (OUTPATIENT)
Dept: FAMILY MEDICINE CLINIC | Facility: CLINIC | Age: 67
End: 2022-12-09

## 2022-12-09 RX ORDER — AMOXICILLIN 875 MG/1
875 TABLET, COATED ORAL 2 TIMES DAILY
Qty: 20 TABLET | Refills: 0 | Status: SHIPPED | OUTPATIENT
Start: 2022-12-09 | End: 2022-12-22

## 2022-12-09 NOTE — TELEPHONE ENCOUNTER
PT SAID TO PLEASE GO AHEAD AND SEND IN PENICILLIN TO [facility] IN [place], SHE'S STILL HAVING SYMPTOMS.        --Should switch to Penicillin if still having symptoms-otherwise finish meds and 3 days after tops repeat urine culture-order placed

## 2022-12-09 NOTE — ADDENDUM NOTE
Addended by: CRISTOBAL COLLAZO on: 12/9/2022 04:01 PM     Modules accepted: Level of Service, SmartSet

## 2022-12-22 ENCOUNTER — OFFICE VISIT (OUTPATIENT)
Dept: FAMILY MEDICINE CLINIC | Facility: CLINIC | Age: 67
End: 2022-12-22

## 2022-12-22 VITALS
BODY MASS INDEX: 29.76 KG/M2 | SYSTOLIC BLOOD PRESSURE: 127 MMHG | TEMPERATURE: 98.4 F | WEIGHT: 151.6 LBS | DIASTOLIC BLOOD PRESSURE: 78 MMHG | HEIGHT: 60 IN

## 2022-12-22 DIAGNOSIS — G89.29 CHRONIC PAIN OF RIGHT KNEE: ICD-10-CM

## 2022-12-22 DIAGNOSIS — R53.1 WEAKNESS: ICD-10-CM

## 2022-12-22 DIAGNOSIS — E11.22 TYPE 2 DIABETES MELLITUS WITH STAGE 2 CHRONIC KIDNEY DISEASE, WITHOUT LONG-TERM CURRENT USE OF INSULIN: ICD-10-CM

## 2022-12-22 DIAGNOSIS — M25.561 CHRONIC PAIN OF RIGHT KNEE: ICD-10-CM

## 2022-12-22 DIAGNOSIS — Z12.11 SCREENING FOR COLON CANCER: ICD-10-CM

## 2022-12-22 DIAGNOSIS — F32.A DEPRESSION, UNSPECIFIED DEPRESSION TYPE: ICD-10-CM

## 2022-12-22 DIAGNOSIS — N18.2 TYPE 2 DIABETES MELLITUS WITH STAGE 2 CHRONIC KIDNEY DISEASE, WITHOUT LONG-TERM CURRENT USE OF INSULIN: ICD-10-CM

## 2022-12-22 DIAGNOSIS — E78.5 HYPERLIPIDEMIA, UNSPECIFIED HYPERLIPIDEMIA TYPE: ICD-10-CM

## 2022-12-22 DIAGNOSIS — I63.9 CEREBROVASCULAR ACCIDENT (CVA), UNSPECIFIED MECHANISM: ICD-10-CM

## 2022-12-22 DIAGNOSIS — I10 BENIGN ESSENTIAL HTN: ICD-10-CM

## 2022-12-22 DIAGNOSIS — R10.84 GENERALIZED ABDOMINAL PAIN: Primary | ICD-10-CM

## 2022-12-22 PROBLEM — E66.3 OVERWEIGHT WITH BODY MASS INDEX (BMI) OF 29 TO 29.9 IN ADULT: Status: ACTIVE | Noted: 2022-12-22

## 2022-12-22 LAB
ALBUMIN UR-MCNC: 5.5 MG/DL
CREAT UR-MCNC: 24.2 MG/DL
MICROALBUMIN/CREAT UR: 227.3 MG/G

## 2022-12-22 PROCEDURE — 99214 OFFICE O/P EST MOD 30 MIN: CPT | Performed by: PREVENTIVE MEDICINE

## 2022-12-22 PROCEDURE — 82570 ASSAY OF URINE CREATININE: CPT | Performed by: PREVENTIVE MEDICINE

## 2022-12-22 PROCEDURE — 82043 UR ALBUMIN QUANTITATIVE: CPT | Performed by: PREVENTIVE MEDICINE

## 2022-12-22 NOTE — PROGRESS NOTES
"Subjective   Odalis Solo is a 67 y.o. female presents for No chief complaint on file.  Answers for HPI/ROS submitted by the patient on 12/20/2022  Please describe your symptoms.: Follow up for UTI and regular routine check up for BP, diabetes  Have you had these symptoms before?: Yes  How long have you been having these symptoms?: Greater than 2 weeks  Please list any medications you are currently taking for this condition.: See chart  Please describe any probable cause for these symptoms. : UA culture  What is the primary reason for your visit?: Other    Patient presents today for follow-up on multiple chronic health conditions most of which are stable.  She does have new abdominal pain which is generalized since her  shunt was placed.  She will tell the neurosurgeon about this and we will order CT of her abdomen and pelvis and that she does have a thickening around the navel which is either a new hernia or just scar tissue from the procedure.    Health Maintenance Due   Topic Date Due   • URINE MICROALBUMIN  Never done   • DXA SCAN  Never done   • COLORECTAL CANCER SCREENING  Never done   • DIABETIC EYE EXAM  04/22/2022   • COVID-19 Vaccine (5 - Booster for Moderna series) 07/14/2022       History of Present Illness     Vitals:    12/22/22 1048 12/22/22 1050 12/22/22 1051   BP: 132/84 125/83 127/78   BP Location: Right arm Left arm Left arm   Patient Position:   Standing   Temp: 98.4 °F (36.9 °C)     Weight: 68.8 kg (151 lb 9.6 oz)     Height: 152.4 cm (60\")       Body mass index is 29.61 kg/m².    Current Outpatient Medications on File Prior to Visit   Medication Sig Dispense Refill   • acetaminophen (TYLENOL) 500 MG tablet 500 mg.     • Alirocumab (Praluent) 75 MG/ML solution auto-injector Inject 1 mL under the skin into the appropriate area as directed Every 14 (Fourteen) Days. 2.24 mL 3   • ASPIRIN 81 PO   0 Refill(s)     • diphenhydrAMINE (BENADRYL) 25 mg capsule Take 25 mg by mouth Every 6 (Six) Hours As " Needed for Itching.     • DULoxetine (CYMBALTA) 60 MG capsule      • furosemide (LASIX) 40 MG tablet Take 40 mg by mouth Every Morning. Hold DOS     • gabapentin (NEURONTIN) 600 MG tablet Take 600 mg by mouth 2 (Two) Times a Day.  0   • glipizide (GLUCOTROL) 5 MG tablet TAKE 1 TABLET BY MOUTH TWICE DAILY 180 tablet 1   • losartan (Cozaar) 50 MG tablet Take 1 tablet by mouth 2 (Two) Times a Day. 180 tablet 1   • metoprolol succinate XL (TOPROL-XL) 100 MG 24 hr tablet Take 100 mg by mouth 2 (two) times a day.     • Omega-3 Fatty Acids (fish oil) 1000 MG capsule capsule Take 1 capsule by mouth Daily.     • nitrofurantoin, macrocrystal-monohydrate, (Macrobid) 100 MG capsule Take 1 capsule by mouth 2 (Two) Times a Day. 14 capsule 0   • [DISCONTINUED] amoxicillin (AMOXIL) 875 MG tablet Take 1 tablet by mouth 2 (Two) Times a Day. 20 tablet 0     No current facility-administered medications on file prior to visit.       The following portions of the patient's history were reviewed and updated as appropriate: allergies, current medications, past family history, past medical history, past social history, past surgical history and problem list.    Review of Systems   Gastrointestinal: Positive for abdominal pain, nausea, vomiting and indigestion.   Psychiatric/Behavioral: Positive for depressed mood.       Objective   Physical Exam  Vitals reviewed.   Constitutional:       General: She is not in acute distress.     Appearance: She is well-developed. She is not ill-appearing or toxic-appearing.      Comments: Overweight   HENT:      Head: Normocephalic and atraumatic.      Right Ear: Tympanic membrane, ear canal and external ear normal.      Left Ear: Tympanic membrane, ear canal and external ear normal.      Nose: Nose normal.      Mouth/Throat:      Mouth: Mucous membranes are moist.      Pharynx: No posterior oropharyngeal erythema.   Eyes:      Extraocular Movements: Extraocular movements intact.      Conjunctiva/sclera:  Conjunctivae normal.      Pupils: Pupils are equal, round, and reactive to light.   Cardiovascular:      Rate and Rhythm: Normal rate and regular rhythm.      Heart sounds: Normal heart sounds.   Pulmonary:      Effort: Pulmonary effort is normal.      Breath sounds: Normal breath sounds.   Abdominal:      General: Bowel sounds are normal. There is no distension.      Palpations: Abdomen is soft. There is mass.      Tenderness: There is no abdominal tenderness.      Hernia: A hernia is present.   Musculoskeletal:         General: Normal range of motion.      Cervical back: Neck supple.   Skin:     General: Skin is warm.   Neurological:      General: No focal deficit present.      Mental Status: She is alert and oriented to person, place, and time. Mental status is at baseline.   Psychiatric:         Mood and Affect: Mood normal.         Behavior: Behavior normal.       PHQ-9 Total Score:      Assessment & Plan   Diagnoses and all orders for this visit:    1. Generalized abdominal pain (Primary)  Comments:  Patient recently had  shunt placed and she noted generalized abdominal pain after that.  We have advised that she does need to report that to the neurosurgeon  Orders:  -     CT Abdomen Pelvis Without Contrast; Future    2. Benign essential HTN  Comments:  Controlled.  Orders:  -     CBC Auto Differential; Future  -     Comprehensive Metabolic Panel; Future    3. Hyperlipidemia, unspecified hyperlipidemia type  Comments:  Patient trying to eat less saturated fats  Orders:  -     Lipid Panel; Future    4. Cerebrovascular accident (CVA), unspecified mechanism (HCC)  Comments:  No return of weakness numbness dysarthria    5. Type 2 diabetes mellitus with stage 2 chronic kidney disease, without long-term current use of insulin (HCC)  Comments:  Blood sugars have been under fair control will get eye exam  Orders:  -     TSH; Future  -     Vitamin B12; Future  -     Hemoglobin A1c; Future  -     Microalbumin /  Creatinine Urine Ratio - Urine, Clean Catch; Future  -     Microalbumin / Creatinine Urine Ratio - Urine, Clean Catch    6. Weakness  Comments:  Weakness is improving    7. Depression, unspecified depression type  Comments:  No HI or SI mood is improving    8. Chronic pain of right knee  Comments:  Patient is following up with the orthopedist the first week in January.  She knows to apply ice or heat.    9. Screening for colon cancer  Comments:  Patient was encouraged to get colon cancer screening.  Orders:  -     Ambulatory Referral For Screening Colonoscopy        Patient Instructions     Health Maintenance Due   Topic Date Due   • URINE MICROALBUMIN  Never done   • DXA SCAN  Never done   • COLORECTAL CANCER SCREENING  Never done   • DIABETIC EYE EXAM  04/22/2022   • COVID-19 Vaccine (5 - Booster for Moderna series) 07/14/2022

## 2022-12-23 NOTE — PROGRESS NOTES
Spilling small amount of protein in urine-so will be improtant we keep blood sugars and blood pressures under control

## 2022-12-29 ENCOUNTER — TELEPHONE (OUTPATIENT)
Dept: FAMILY MEDICINE CLINIC | Facility: CLINIC | Age: 67
End: 2022-12-29

## 2022-12-29 RX ORDER — ICOSAPENT ETHYL 1000 MG/1
2 CAPSULE ORAL 2 TIMES DAILY WITH MEALS
Qty: 120 CAPSULE | Refills: 3 | Status: SHIPPED | OUTPATIENT
Start: 2022-12-29 | End: 2022-12-30 | Stop reason: SDUPTHER

## 2022-12-29 RX ORDER — GABAPENTIN 600 MG/1
600 TABLET ORAL 2 TIMES DAILY
Qty: 180 TABLET | Refills: 0 | Status: SHIPPED | OUTPATIENT
Start: 2022-12-29 | End: 2023-03-21

## 2022-12-29 NOTE — TELEPHONE ENCOUNTER
Caller: Odalis Solo    Relationship: Self    Best call back number: [phone]    What medication are you requesting: VASCEPA 1 G    If a prescription is needed, what is your preferred pharmacy and phone number: [id number] - [place], [place] - [address] [place] [address] [address] [facility] [address] [facility] [phone] [facility] [phone] FX     Additional notes: PATIENT REQUESTING REFILL ON PRESCRIPTION, NOT ON CURRENT MED LIST.    PLEASE ADVISE

## 2022-12-29 NOTE — TELEPHONE ENCOUNTER
Caller: Odalis oSlo    Relationship: Self    Best call back number: [phone]    Requested Prescriptions:   Requested Prescriptions     Pending Prescriptions Disp Refills   • gabapentin (NEURONTIN) 600 MG tablet  0     Sig: Take 1 tablet by mouth 2 (Two) Times a Day.        Pharmacy where request should be sent: [id number] - [facility] [place] [address] [facility] [address] [phone] [place] [phone] FX     Additional details provided by patient: PATIENT HAS 3 DAYS LEFT    Does the patient have less than a 3 day supply:  [x] Yes  [] No    Would you like a call back once the refill request has been completed: [x] Yes [] No    If the office needs to give you a call back, can they leave a voicemail: [x] Yes [] No    Jennifer Antunez Rep   12/29/22 11:51 EST

## 2022-12-29 NOTE — TELEPHONE ENCOUNTER
Rx Refill Note  Requested Prescriptions     Pending Prescriptions Disp Refills   • gabapentin (NEURONTIN) 600 MG tablet  0     Sig: Take 1 tablet by mouth 2 (Two) Times a Day.      Last office visit with prescribing clinician: 12/22/2022      Next office visit with prescribing clinician: 3/23/2023   3}  Lizzy Taylor  12/29/22, 12:31 EST

## 2022-12-30 RX ORDER — ICOSAPENT ETHYL 1000 MG/1
2 CAPSULE ORAL 2 TIMES DAILY WITH MEALS
Qty: 120 CAPSULE | Refills: 0 | Status: SHIPPED | OUTPATIENT
Start: 2022-12-30 | End: 2023-03-21

## 2023-01-05 ENCOUNTER — TELEPHONE (OUTPATIENT)
Dept: FAMILY MEDICINE CLINIC | Facility: CLINIC | Age: 68
End: 2023-01-05
Payer: MEDICARE

## 2023-01-05 NOTE — TELEPHONE ENCOUNTER
Would like to use Vascepa 1GM capsules, as a cost effective generic alternative    Rx Refill Note  Requested Prescriptions      No prescriptions requested or ordered in this encounter      Last office visit with prescribing clinician: 12/22/2022   Last telemedicine visit with prescribing clinician: 3/23/2023   Next office visit with prescribing clinician: 3/23/2023                         Would you like a call back once the refill request has been completed: [] Yes [] No    If the office needs to give you a call back, can they leave a voicemail: [] Yes [] No    Erika Silva MA  01/05/23, 12:18 EST

## 2023-01-20 ENCOUNTER — HOSPITAL ENCOUNTER (OUTPATIENT)
Dept: CT IMAGING | Facility: HOSPITAL | Age: 68
Discharge: HOME | End: 2023-01-20
Admitting: PREVENTIVE MEDICINE
Payer: MEDICARE

## 2023-01-20 DIAGNOSIS — R10.84 GENERALIZED ABDOMINAL PAIN: ICD-10-CM

## 2023-01-20 PROCEDURE — 74176 CT ABD & PELVIS W/O CONTRAST: CPT

## 2023-01-22 NOTE — PROGRESS NOTES
Please call patient and make sure that she has a follow-up with the physician that placed her  shunt to see if it needs to be repositioned due to its close proximity to her ovary.

## 2023-01-23 ENCOUNTER — TELEPHONE (OUTPATIENT)
Dept: FAMILY MEDICINE CLINIC | Facility: CLINIC | Age: 68
End: 2023-01-23
Payer: MEDICARE

## 2023-01-23 NOTE — TELEPHONE ENCOUNTER
HUB TO READ:  ----- Message from Aubree Alas MD sent at 1/22/2023  8:33 AM EST -----  Please call patient and make sure that she has a follow-up with the physician that placed her  shunt to see if it needs to be repositioned due to its close proximity to her ovary.

## 2023-01-24 DIAGNOSIS — N83.201 CYST OF RIGHT OVARY: Primary | ICD-10-CM

## 2023-01-24 DIAGNOSIS — Z98.2 VP (VENTRICULOPERITONEAL) SHUNT STATUS: ICD-10-CM

## 2023-01-24 DIAGNOSIS — R10.31 RIGHT LOWER QUADRANT ABDOMINAL PAIN: ICD-10-CM

## 2023-01-25 ENCOUNTER — TELEPHONE (OUTPATIENT)
Dept: FAMILY MEDICINE CLINIC | Facility: CLINIC | Age: 68
End: 2023-01-25

## 2023-01-25 ENCOUNTER — CLINICAL SUPPORT (OUTPATIENT)
Dept: FAMILY MEDICINE CLINIC | Facility: CLINIC | Age: 68
End: 2023-01-25
Payer: MEDICARE

## 2023-01-25 DIAGNOSIS — N18.2 TYPE 2 DIABETES MELLITUS WITH STAGE 2 CHRONIC KIDNEY DISEASE, WITHOUT LONG-TERM CURRENT USE OF INSULIN: ICD-10-CM

## 2023-01-25 DIAGNOSIS — I10 BENIGN ESSENTIAL HTN: ICD-10-CM

## 2023-01-25 DIAGNOSIS — N39.0 URINARY TRACT INFECTION WITHOUT HEMATURIA, SITE UNSPECIFIED: ICD-10-CM

## 2023-01-25 DIAGNOSIS — E78.5 HYPERLIPIDEMIA, UNSPECIFIED HYPERLIPIDEMIA TYPE: ICD-10-CM

## 2023-01-25 DIAGNOSIS — E11.22 TYPE 2 DIABETES MELLITUS WITH STAGE 2 CHRONIC KIDNEY DISEASE, WITHOUT LONG-TERM CURRENT USE OF INSULIN: ICD-10-CM

## 2023-01-25 LAB — HBA1C MFR BLD: 6.2 % (ref 3.5–5.6)

## 2023-01-25 PROCEDURE — 85025 COMPLETE CBC W/AUTO DIFF WBC: CPT | Performed by: PREVENTIVE MEDICINE

## 2023-01-25 PROCEDURE — 80053 COMPREHEN METABOLIC PANEL: CPT | Performed by: PREVENTIVE MEDICINE

## 2023-01-25 PROCEDURE — 82607 VITAMIN B-12: CPT | Performed by: PREVENTIVE MEDICINE

## 2023-01-25 PROCEDURE — 36415 COLL VENOUS BLD VENIPUNCTURE: CPT | Performed by: PREVENTIVE MEDICINE

## 2023-01-25 PROCEDURE — 87086 URINE CULTURE/COLONY COUNT: CPT | Performed by: PREVENTIVE MEDICINE

## 2023-01-25 PROCEDURE — 83036 HEMOGLOBIN GLYCOSYLATED A1C: CPT | Performed by: PREVENTIVE MEDICINE

## 2023-01-25 PROCEDURE — 84443 ASSAY THYROID STIM HORMONE: CPT | Performed by: PREVENTIVE MEDICINE

## 2023-01-25 PROCEDURE — 81001 URINALYSIS AUTO W/SCOPE: CPT | Performed by: PREVENTIVE MEDICINE

## 2023-01-25 PROCEDURE — 80061 LIPID PANEL: CPT | Performed by: PREVENTIVE MEDICINE

## 2023-01-25 PROCEDURE — 85007 BL SMEAR W/DIFF WBC COUNT: CPT | Performed by: PREVENTIVE MEDICINE

## 2023-01-25 NOTE — TELEPHONE ENCOUNTER
I don't have anything charted about back pain on 12 22 visit.  Will have to see her before ordering PT

## 2023-01-26 ENCOUNTER — TELEPHONE (OUTPATIENT)
Dept: FAMILY MEDICINE CLINIC | Facility: CLINIC | Age: 68
End: 2023-01-26
Payer: MEDICARE

## 2023-01-26 ENCOUNTER — CLINICAL SUPPORT (OUTPATIENT)
Dept: FAMILY MEDICINE CLINIC | Facility: CLINIC | Age: 68
End: 2023-01-26
Payer: MEDICARE

## 2023-01-26 DIAGNOSIS — N39.0 URINARY TRACT INFECTION WITHOUT HEMATURIA, SITE UNSPECIFIED: Primary | ICD-10-CM

## 2023-01-26 DIAGNOSIS — N39.0 URINARY TRACT INFECTION WITHOUT HEMATURIA, SITE UNSPECIFIED: ICD-10-CM

## 2023-01-26 LAB
ALBUMIN SERPL-MCNC: 4.8 G/DL (ref 3.5–5.2)
ALBUMIN/GLOB SERPL: 2.1 G/DL
ALP SERPL-CCNC: 137 U/L (ref 39–117)
ALT SERPL W P-5'-P-CCNC: 15 U/L (ref 1–33)
ANION GAP SERPL CALCULATED.3IONS-SCNC: 12 MMOL/L (ref 5–15)
AST SERPL-CCNC: 20 U/L (ref 1–32)
BACTERIA UR QL AUTO: ABNORMAL /HPF
BASOPHILS # BLD AUTO: 0.03 10*3/MM3 (ref 0–0.2)
BASOPHILS NFR BLD AUTO: 0.6 % (ref 0–1.5)
BILIRUB SERPL-MCNC: 0.3 MG/DL (ref 0–1.2)
BILIRUB UR QL STRIP: NEGATIVE
BUN SERPL-MCNC: 19 MG/DL (ref 8–23)
BUN/CREAT SERPL: 19 (ref 7–25)
CALCIUM SPEC-SCNC: 9.7 MG/DL (ref 8.6–10.5)
CHLORIDE SERPL-SCNC: 106 MMOL/L (ref 98–107)
CHOLEST SERPL-MCNC: 119 MG/DL (ref 0–200)
CLARITY UR: CLEAR
CO2 SERPL-SCNC: 25 MMOL/L (ref 22–29)
COLOR UR: YELLOW
CREAT SERPL-MCNC: 1 MG/DL (ref 0.57–1)
DEPRECATED RDW RBC AUTO: 40.7 FL (ref 37–54)
EGFRCR SERPLBLD CKD-EPI 2021: 62.3 ML/MIN/1.73
EOSINOPHIL # BLD AUTO: 0.06 10*3/MM3 (ref 0–0.4)
EOSINOPHIL NFR BLD AUTO: 1.2 % (ref 0.3–6.2)
ERYTHROCYTE [DISTWIDTH] IN BLOOD BY AUTOMATED COUNT: 12.5 % (ref 12.3–15.4)
GLOBULIN UR ELPH-MCNC: 2.3 GM/DL
GLUCOSE SERPL-MCNC: 153 MG/DL (ref 65–99)
GLUCOSE UR STRIP-MCNC: NEGATIVE MG/DL
HCT VFR BLD AUTO: 39.9 % (ref 34–46.6)
HDLC SERPL-MCNC: 45 MG/DL (ref 40–60)
HGB BLD-MCNC: 13.1 G/DL (ref 12–15.9)
HGB UR QL STRIP.AUTO: NEGATIVE
HYALINE CASTS UR QL AUTO: ABNORMAL /LPF
IMM GRANULOCYTES # BLD AUTO: 0.01 10*3/MM3 (ref 0–0.05)
IMM GRANULOCYTES NFR BLD AUTO: 0.2 % (ref 0–0.5)
KETONES UR QL STRIP: NEGATIVE
LDLC SERPL CALC-MCNC: 45 MG/DL (ref 0–100)
LDLC/HDLC SERPL: 0.86 {RATIO}
LEUKOCYTE ESTERASE UR QL STRIP.AUTO: ABNORMAL
LYMPHOCYTES # BLD AUTO: 1.54 10*3/MM3 (ref 0.7–3.1)
LYMPHOCYTES NFR BLD AUTO: 30.7 % (ref 19.6–45.3)
MCH RBC QN AUTO: 29.8 PG (ref 26.6–33)
MCHC RBC AUTO-ENTMCNC: 32.8 G/DL (ref 31.5–35.7)
MCV RBC AUTO: 90.7 FL (ref 79–97)
MONOCYTES # BLD AUTO: 0.37 10*3/MM3 (ref 0.1–0.9)
MONOCYTES NFR BLD AUTO: 7.4 % (ref 5–12)
NEUTROPHILS NFR BLD AUTO: 3.01 10*3/MM3 (ref 1.7–7)
NEUTROPHILS NFR BLD AUTO: 59.9 % (ref 42.7–76)
NITRITE UR QL STRIP: NEGATIVE
NRBC BLD AUTO-RTO: 0 /100 WBC (ref 0–0.2)
PH UR STRIP.AUTO: 6 [PH] (ref 5–8)
PLAT MORPH BLD: NORMAL
PLATELET # BLD AUTO: 265 10*3/MM3 (ref 140–450)
PMV BLD AUTO: 10.9 FL (ref 6–12)
POTASSIUM SERPL-SCNC: 5.1 MMOL/L (ref 3.5–5.2)
PROT SERPL-MCNC: 7.1 G/DL (ref 6–8.5)
PROT UR QL STRIP: NEGATIVE
RBC # BLD AUTO: 4.4 10*6/MM3 (ref 3.77–5.28)
RBC # UR STRIP: ABNORMAL /HPF
RBC MORPH BLD: NORMAL
REF LAB TEST METHOD: ABNORMAL
SODIUM SERPL-SCNC: 143 MMOL/L (ref 136–145)
SP GR UR STRIP: 1.02 (ref 1–1.03)
SQUAMOUS #/AREA URNS HPF: ABNORMAL /HPF
TRIGL SERPL-MCNC: 176 MG/DL (ref 0–150)
TSH SERPL DL<=0.05 MIU/L-ACNC: 2.19 UIU/ML (ref 0.27–4.2)
UROBILINOGEN UR QL STRIP: ABNORMAL
VIT B12 BLD-MCNC: 269 PG/ML (ref 211–946)
VLDLC SERPL-MCNC: 29 MG/DL (ref 5–40)
WBC # UR STRIP: ABNORMAL /HPF
WBC MORPH BLD: NORMAL
WBC NRBC COR # BLD: 5.02 10*3/MM3 (ref 3.4–10.8)

## 2023-01-26 PROCEDURE — 87086 URINE CULTURE/COLONY COUNT: CPT | Performed by: PREVENTIVE MEDICINE

## 2023-01-26 NOTE — PROGRESS NOTES
Patient's urine showed a lot of leukocytes.  But for some reason the urine was not cultured please come in and collect another specimen using careful clean-catch technique for culture.  Are you having any symptoms of burning fever or flank pain or blood in your urine?  Blood sugar was 153 and A1c shows good control at 6.2.  Vitamin B12 is low so you should increase of 1000 units daily and we will recheck with next labs call if any other questions or concerns.

## 2023-01-26 NOTE — TELEPHONE ENCOUNTER
HUB TO READ:  ----- Message from Aubree Alas MD sent at 1/26/2023  7:26 AM EST -----  Patient's urine showed a lot of leukocytes.  But for some reason the urine was not cultured please come in and collect another specimen using careful clean-catch technique for culture.  Are you having any symptoms of burning fever or flank pain or blood in your urine?  Blood sugar was 153 and A1c shows good control at 6.2.  Vitamin B12 is low so you should increase of 1000 units daily and we will recheck with next labs call if any other questions or concerns.

## 2023-01-27 LAB — BACTERIA SPEC AEROBE CULT: NORMAL

## 2023-01-28 LAB — BACTERIA SPEC AEROBE CULT: NO GROWTH

## 2023-01-30 ENCOUNTER — TELEPHONE (OUTPATIENT)
Dept: FAMILY MEDICINE CLINIC | Facility: CLINIC | Age: 68
End: 2023-01-30
Payer: MEDICARE

## 2023-01-30 NOTE — TELEPHONE ENCOUNTER
HUB TO READ:  ----- Message from Aubree Alas MD sent at 1/28/2023  7:37 AM EST -----  Urine looks to be contaminated .  iF STILL HAVING SYMPTOMS, LET'S DO A CAREFUL RECOLLECT

## 2023-02-03 NOTE — PROGRESS NOTES
Odalis Solo dropped off urine specimen    Quality 111:Pneumonia Vaccination Status For Older Adults: Pneumococcal Vaccination not Administered or Previously Received, Reason not Otherwise Specified Quality 128: Preventive Care And Screening: Body Mass Index (Bmi) Screening And Follow-Up Plan: BMI is documented above normal parameters and a follow-up plan is documented Additional Notes: Refer to pcp for bmi. Quality 265: Biopsy Follow-Up: Biopsy results reviewed, communicated, tracked, and documented Quality 431: Preventive Care And Screening: Unhealthy Alcohol Use - Screening: Patient screened for unhealthy alcohol use using a single question and scores less than 2 times per year Detail Level: Generalized Quality 130: Documentation Of Current Medications In The Medical Record: Current Medications Documented Quality 110: Preventive Care And Screening: Influenza Immunization: Influenza Immunization previously received during influenza season

## 2023-02-05 RX ORDER — ALIROCUMAB 75 MG/ML
INJECTION, SOLUTION SUBCUTANEOUS
Qty: 2 ML | Refills: 3 | Status: SHIPPED | OUTPATIENT
Start: 2023-02-05

## 2023-02-07 ENCOUNTER — TELEPHONE (OUTPATIENT)
Dept: FAMILY MEDICINE CLINIC | Facility: CLINIC | Age: 68
End: 2023-02-07
Payer: MEDICARE

## 2023-02-07 NOTE — TELEPHONE ENCOUNTER
Caller: GermaniaOdalis    Relationship: Self    Best call back number:   Odalis Solo (Self) [phone] (Mobile)       What was the call regarding: PATIENT ACCIDENTALLY THREW AWAY THE PAPERWORK FOR HER REFERRAL AND WANTED TO  ANOTHER COPY TODAY PLEASE     Do you require a callback: YES

## 2023-03-02 ENCOUNTER — OFFICE (AMBULATORY)
Dept: URBAN - METROPOLITAN AREA CLINIC 64 | Facility: CLINIC | Age: 68
End: 2023-03-02

## 2023-03-02 VITALS
HEIGHT: 60 IN | WEIGHT: 154 LBS | HEART RATE: 73 BPM | DIASTOLIC BLOOD PRESSURE: 72 MMHG | SYSTOLIC BLOOD PRESSURE: 119 MMHG

## 2023-03-02 DIAGNOSIS — Z98.2 PRESENCE OF CEREBROSPINAL FLUID DRAINAGE DEVICE: ICD-10-CM

## 2023-03-02 DIAGNOSIS — G93.5 COMPRESSION OF BRAIN: ICD-10-CM

## 2023-03-02 DIAGNOSIS — K62.5 HEMORRHAGE OF ANUS AND RECTUM: ICD-10-CM

## 2023-03-02 DIAGNOSIS — G91.9 HYDROCEPHALUS, UNSPECIFIED: ICD-10-CM

## 2023-03-02 DIAGNOSIS — R10.30 LOWER ABDOMINAL PAIN, UNSPECIFIED: ICD-10-CM

## 2023-03-02 DIAGNOSIS — R19.4 CHANGE IN BOWEL HABIT: ICD-10-CM

## 2023-03-02 DIAGNOSIS — K21.9 GASTRO-ESOPHAGEAL REFLUX DISEASE WITHOUT ESOPHAGITIS: ICD-10-CM

## 2023-03-02 PROCEDURE — 99214 OFFICE O/P EST MOD 30 MIN: CPT

## 2023-03-15 ENCOUNTER — TELEPHONE (OUTPATIENT)
Dept: FAMILY MEDICINE CLINIC | Facility: CLINIC | Age: 68
End: 2023-03-15
Payer: MEDICARE

## 2023-03-15 NOTE — TELEPHONE ENCOUNTER
Caller: TOYIN    Relationship: Other    Best call back number: [phone] OPTION 3    TOYIN, WITH TEGAN ORTIZ CALLED, TO INFORM THE DOCTOR THAT THIS PATIENT HAS  COMPLETED HER HEALTH RISK ASSESSMENT AND PLAN OF CARE.    IF DR PAGE WISHES TO REVIEW THIS IT CAN BE DONE SO THROUGH THE ANTH PROVIDER PORTAL.    IF DR PAGE WANTED TO PARTICIPATE IN TEAM ROUNDS, CALL  [phone] OPTION 3

## 2023-03-21 RX ORDER — ICOSAPENT ETHYL 1000 MG/1
CAPSULE ORAL
Qty: 120 CAPSULE | Refills: 0 | Status: SHIPPED | OUTPATIENT
Start: 2023-03-21

## 2023-03-21 RX ORDER — GABAPENTIN 600 MG/1
TABLET ORAL
Qty: 180 TABLET | Refills: 0 | Status: SHIPPED | OUTPATIENT
Start: 2023-03-21 | End: 2023-03-30

## 2023-03-22 PROBLEM — Z86.73 HISTORY OF STROKE: Status: ACTIVE | Noted: 2019-12-05

## 2023-03-22 PROBLEM — G45.9 TRANSIENT CEREBRAL ISCHEMIA: Status: ACTIVE | Noted: 2023-01-18

## 2023-03-22 PROBLEM — N83.201 CYST OF RIGHT OVARY: Status: ACTIVE | Noted: 2023-03-22

## 2023-03-22 NOTE — PATIENT INSTRUCTIONS
Health Maintenance Due   Topic Date Due    DXA SCAN  Never done    COVID-19 Vaccine (5 - Booster for Moderna series) 07/14/2022    Pneumococcal Vaccine 65+ (3 - PPSV23 if available, else PCV20) 01/20/2023    Patient to call Dr. Pham for US of Right ovarian cyst

## 2023-03-23 ENCOUNTER — OFFICE VISIT (OUTPATIENT)
Dept: FAMILY MEDICINE CLINIC | Facility: CLINIC | Age: 68
End: 2023-03-23
Payer: MEDICARE

## 2023-03-23 VITALS
HEART RATE: 75 BPM | BODY MASS INDEX: 30.51 KG/M2 | TEMPERATURE: 98.4 F | OXYGEN SATURATION: 98 % | WEIGHT: 155.4 LBS | HEIGHT: 60 IN | DIASTOLIC BLOOD PRESSURE: 99 MMHG | SYSTOLIC BLOOD PRESSURE: 157 MMHG

## 2023-03-23 DIAGNOSIS — G89.29 CHRONIC LOW BACK PAIN, UNSPECIFIED BACK PAIN LATERALITY, UNSPECIFIED WHETHER SCIATICA PRESENT: ICD-10-CM

## 2023-03-23 DIAGNOSIS — G43.109 MIGRAINE WITH AURA AND WITHOUT STATUS MIGRAINOSUS, NOT INTRACTABLE: ICD-10-CM

## 2023-03-23 DIAGNOSIS — E11.22 TYPE 2 DIABETES MELLITUS WITH STAGE 2 CHRONIC KIDNEY DISEASE, WITHOUT LONG-TERM CURRENT USE OF INSULIN: ICD-10-CM

## 2023-03-23 DIAGNOSIS — K21.9 GASTROESOPHAGEAL REFLUX DISEASE, UNSPECIFIED WHETHER ESOPHAGITIS PRESENT: ICD-10-CM

## 2023-03-23 DIAGNOSIS — M54.50 CHRONIC LOW BACK PAIN, UNSPECIFIED BACK PAIN LATERALITY, UNSPECIFIED WHETHER SCIATICA PRESENT: ICD-10-CM

## 2023-03-23 DIAGNOSIS — R06.09 DOE (DYSPNEA ON EXERTION): ICD-10-CM

## 2023-03-23 DIAGNOSIS — Z78.0 POST-MENOPAUSAL: Primary | ICD-10-CM

## 2023-03-23 DIAGNOSIS — G89.29 CHRONIC PAIN OF RIGHT KNEE: ICD-10-CM

## 2023-03-23 DIAGNOSIS — M25.561 CHRONIC PAIN OF RIGHT KNEE: ICD-10-CM

## 2023-03-23 DIAGNOSIS — E78.5 HYPERLIPIDEMIA, UNSPECIFIED HYPERLIPIDEMIA TYPE: ICD-10-CM

## 2023-03-23 DIAGNOSIS — R07.89 CHEST TIGHTNESS: ICD-10-CM

## 2023-03-23 DIAGNOSIS — N83.201 CYST OF RIGHT OVARY: ICD-10-CM

## 2023-03-23 DIAGNOSIS — E66.09 CLASS 1 OBESITY DUE TO EXCESS CALORIES WITH SERIOUS COMORBIDITY AND BODY MASS INDEX (BMI) OF 30.0 TO 30.9 IN ADULT: ICD-10-CM

## 2023-03-23 DIAGNOSIS — N28.1 RENAL CYST: ICD-10-CM

## 2023-03-23 DIAGNOSIS — R22.2 MASS ON BACK: ICD-10-CM

## 2023-03-23 DIAGNOSIS — Z86.73 HISTORY OF STROKE: ICD-10-CM

## 2023-03-23 DIAGNOSIS — I10 BENIGN ESSENTIAL HTN: ICD-10-CM

## 2023-03-23 DIAGNOSIS — F32.A DEPRESSION, UNSPECIFIED DEPRESSION TYPE: ICD-10-CM

## 2023-03-23 DIAGNOSIS — N18.2 TYPE 2 DIABETES MELLITUS WITH STAGE 2 CHRONIC KIDNEY DISEASE, WITHOUT LONG-TERM CURRENT USE OF INSULIN: ICD-10-CM

## 2023-03-23 PROBLEM — R19.7 DIARRHEA: Status: ACTIVE | Noted: 2023-03-23

## 2023-03-23 PROBLEM — K57.92 DIVERTICULITIS: Status: ACTIVE | Noted: 2023-03-23

## 2023-03-23 PROBLEM — K62.5 RECTAL BLEEDING: Status: ACTIVE | Noted: 2023-03-23

## 2023-03-23 PROBLEM — R10.30 LOWER ABDOMINAL PAIN, UNSPECIFIED: Status: ACTIVE | Noted: 2023-03-23

## 2023-03-23 PROBLEM — E66.811 CLASS 1 OBESITY DUE TO EXCESS CALORIES WITH SERIOUS COMORBIDITY AND BODY MASS INDEX (BMI) OF 30.0 TO 30.9 IN ADULT: Status: ACTIVE | Noted: 2022-12-22

## 2023-03-23 PROBLEM — K58.9 IRRITABLE BOWEL SYNDROME: Status: ACTIVE | Noted: 2023-03-23

## 2023-03-23 PROBLEM — D64.9 ANEMIA, UNSPECIFIED: Status: ACTIVE | Noted: 2023-03-23

## 2023-03-23 PROBLEM — R11.0 NAUSEA: Status: ACTIVE | Noted: 2023-03-23

## 2023-03-23 PROBLEM — R13.10 DYSPHAGIA: Status: ACTIVE | Noted: 2023-03-23

## 2023-03-23 LAB
ALBUMIN SERPL-MCNC: 4.6 G/DL (ref 3.5–5.2)
ALBUMIN UR-MCNC: <1.2 MG/DL
ALBUMIN/GLOB SERPL: 1.7 G/DL
ALP SERPL-CCNC: 105 U/L (ref 39–117)
ALT SERPL W P-5'-P-CCNC: 15 U/L (ref 1–33)
ANION GAP SERPL CALCULATED.3IONS-SCNC: 14.1 MMOL/L (ref 5–15)
AST SERPL-CCNC: 15 U/L (ref 1–32)
BASOPHILS # BLD AUTO: 0.03 10*3/MM3 (ref 0–0.2)
BASOPHILS NFR BLD AUTO: 0.5 % (ref 0–1.5)
BILIRUB SERPL-MCNC: 0.4 MG/DL (ref 0–1.2)
BUN SERPL-MCNC: 11 MG/DL (ref 8–23)
BUN/CREAT SERPL: 11.6 (ref 7–25)
CALCIUM SPEC-SCNC: 10.2 MG/DL (ref 8.6–10.5)
CHLORIDE SERPL-SCNC: 99 MMOL/L (ref 98–107)
CHOLEST SERPL-MCNC: 142 MG/DL (ref 0–200)
CO2 SERPL-SCNC: 25.9 MMOL/L (ref 22–29)
CREAT SERPL-MCNC: 0.95 MG/DL (ref 0.57–1)
CREAT UR-MCNC: 44.8 MG/DL
DEPRECATED RDW RBC AUTO: 42.7 FL (ref 37–54)
EGFRCR SERPLBLD CKD-EPI 2021: 66.2 ML/MIN/1.73
EOSINOPHIL # BLD AUTO: 0.1 10*3/MM3 (ref 0–0.4)
EOSINOPHIL NFR BLD AUTO: 1.5 % (ref 0.3–6.2)
ERYTHROCYTE [DISTWIDTH] IN BLOOD BY AUTOMATED COUNT: 12.9 % (ref 12.3–15.4)
GLOBULIN UR ELPH-MCNC: 2.7 GM/DL
GLUCOSE SERPL-MCNC: 101 MG/DL (ref 65–99)
HBA1C MFR BLD: 6.1 % (ref 4.8–5.6)
HCT VFR BLD AUTO: 38.8 % (ref 34–46.6)
HDLC SERPL-MCNC: 45 MG/DL (ref 40–60)
HGB BLD-MCNC: 13 G/DL (ref 12–15.9)
IMM GRANULOCYTES # BLD AUTO: 0.02 10*3/MM3 (ref 0–0.05)
IMM GRANULOCYTES NFR BLD AUTO: 0.3 % (ref 0–0.5)
LDLC SERPL CALC-MCNC: 47 MG/DL (ref 0–100)
LDLC/HDLC SERPL: 0.68 {RATIO}
LYMPHOCYTES # BLD AUTO: 1.92 10*3/MM3 (ref 0.7–3.1)
LYMPHOCYTES NFR BLD AUTO: 29.7 % (ref 19.6–45.3)
MAGNESIUM SERPL-MCNC: 1.9 MG/DL (ref 1.6–2.4)
MCH RBC QN AUTO: 30.7 PG (ref 26.6–33)
MCHC RBC AUTO-ENTMCNC: 33.5 G/DL (ref 31.5–35.7)
MCV RBC AUTO: 91.5 FL (ref 79–97)
MICROALBUMIN/CREAT UR: NORMAL MG/G{CREAT}
MONOCYTES # BLD AUTO: 0.44 10*3/MM3 (ref 0.1–0.9)
MONOCYTES NFR BLD AUTO: 6.8 % (ref 5–12)
NEUTROPHILS NFR BLD AUTO: 3.96 10*3/MM3 (ref 1.7–7)
NEUTROPHILS NFR BLD AUTO: 61.2 % (ref 42.7–76)
NRBC BLD AUTO-RTO: 0 /100 WBC (ref 0–0.2)
PLATELET # BLD AUTO: 278 10*3/MM3 (ref 140–450)
PMV BLD AUTO: 10.2 FL (ref 6–12)
POTASSIUM SERPL-SCNC: 3.8 MMOL/L (ref 3.5–5.2)
PROT SERPL-MCNC: 7.3 G/DL (ref 6–8.5)
RBC # BLD AUTO: 4.24 10*6/MM3 (ref 3.77–5.28)
SODIUM SERPL-SCNC: 139 MMOL/L (ref 136–145)
TRIGL SERPL-MCNC: 332 MG/DL (ref 0–150)
TSH SERPL DL<=0.05 MIU/L-ACNC: 1.18 UIU/ML (ref 0.27–4.2)
VIT B12 BLD-MCNC: 290 PG/ML (ref 211–946)
VLDLC SERPL-MCNC: 50 MG/DL (ref 5–40)
WBC NRBC COR # BLD: 6.47 10*3/MM3 (ref 3.4–10.8)

## 2023-03-23 PROCEDURE — 84443 ASSAY THYROID STIM HORMONE: CPT | Performed by: PREVENTIVE MEDICINE

## 2023-03-23 PROCEDURE — 83036 HEMOGLOBIN GLYCOSYLATED A1C: CPT | Performed by: PREVENTIVE MEDICINE

## 2023-03-23 PROCEDURE — 82607 VITAMIN B-12: CPT | Performed by: PREVENTIVE MEDICINE

## 2023-03-23 PROCEDURE — 85025 COMPLETE CBC W/AUTO DIFF WBC: CPT | Performed by: PREVENTIVE MEDICINE

## 2023-03-23 PROCEDURE — 82043 UR ALBUMIN QUANTITATIVE: CPT | Performed by: PREVENTIVE MEDICINE

## 2023-03-23 PROCEDURE — 82570 ASSAY OF URINE CREATININE: CPT | Performed by: PREVENTIVE MEDICINE

## 2023-03-23 PROCEDURE — 80053 COMPREHEN METABOLIC PANEL: CPT | Performed by: PREVENTIVE MEDICINE

## 2023-03-23 PROCEDURE — 83735 ASSAY OF MAGNESIUM: CPT | Performed by: PREVENTIVE MEDICINE

## 2023-03-23 PROCEDURE — 80061 LIPID PANEL: CPT | Performed by: PREVENTIVE MEDICINE

## 2023-03-23 RX ORDER — METOPROLOL TARTRATE 100 MG/1
TABLET ORAL
Qty: 180 TABLET | OUTPATIENT
Start: 2023-03-23

## 2023-03-23 NOTE — TELEPHONE ENCOUNTER
I have her on metoprolol succinate and this asking for Tartrate-see which she is on and correct if necessary.  Make sure she has take DM meds without problems as is allergic to Sulfa

## 2023-03-23 NOTE — PROGRESS NOTES
Venipuncture performed on Left Arm by Rosa Maria Childs MA  with good hemostasis. Patient tolerated well. 03/23/23

## 2023-03-23 NOTE — TELEPHONE ENCOUNTER
Rx Refill Note  Requested Prescriptions     Pending Prescriptions Disp Refills    glipizide (GLUCOTROL) 5 MG tablet [Pharmacy Med Name: GLIPIZIDE 5MG TABLETS] 180 tablet 1     Sig: TAKE 1 TABLET BY MOUTH TWICE DAILY    metoprolol tartrate (LOPRESSOR) 100 MG tablet [Pharmacy Med Name: METOPROLOL TARTRATE 100MG TABLETS] 180 tablet      Sig: TAKE 1 TABLET BY MOUTH TWICE DAILY.      Last office visit with prescribing clinician: 12/22/2022   Last telemedicine visit with prescribing clinician: 3/23/2023   Next office visit with prescribing clinician: 3/23/2023                         Would you like a call back once the refill request has been completed: [] Yes [] No    If the office needs to give you a call back, can they leave a voicemail: [] Yes [] No    Rosa Maria Childs MA  03/23/23, 10:53 EDT

## 2023-03-23 NOTE — PROGRESS NOTES
"Chief Complaint  Follow-up (3 month/Total knee replacement next month/Having issues with her son at home.)    Subjective        Odalis Solo presents to [facility]  History of Present Illness     Odails Solo is present today for a follow-up for benign hypertension, postmenopausal, hyperlipidemia, history of stroke, type 2 diabetes, chest tightness, dyspnea on exertion, mass on her back, migraine, renal cyst, depression, GERD and class I obesity with serious health comorbidities.    The patient reports not having current issues. She denies having a recent visit to the ER. The patient states she is having right knee pain with swelling. And now is having back pain. She notes she is taking 2 Tylenol 500 mg every 6 hours and using ice packs on her knee. She denies loss of bladder or bowel control. She denies stroke symptoms. Blood sugars normal range. Patient reports eye exam is up to date.  The patient reports her migraines are well controlled. She states the last one was 2 weeks ago and was due to stress. She notes meditation, stretching and being outside sometimes helps. The patient reports she is trying to watch her saturated fats and lose weight for a right knee replacement next 4/13/2023.    The patient has not had her pneumonia vaccine.  Patient reports being allergic to Sulfa products.    Objective   Vital Signs:  /99 (BP Location: Left arm, Patient Position: Sitting, Cuff Size: Adult)   Pulse 75   Temp 98.4 °F (36.9 °C) (Tympanic)   Ht 152.4 cm (60\")   Wt 70.5 kg (155 lb 6.4 oz)   SpO2 98%   BMI 30.35 kg/m²   Estimated body mass index is 30.35 kg/m² as calculated from the following:    Height as of this encounter: 152.4 cm (60\").    Weight as of this encounter: 70.5 kg (155 lb 6.4 oz).       BMI is >= 30 and <35. (Class 1 Obesity). The following options were offered after discussion;: exercise counseling/recommendations and nutrition " counseling/recommendations      Physical Exam   Result Review :                   Assessment and Plan   Diagnoses and all orders for this visit:    1. Post-menopausal (Primary)  -     DEXA Bone Density Axial; Future    2. Benign essential HTN  -     CBC Auto Differential; Future  -     CBC Auto Differential    3. Hyperlipidemia, unspecified hyperlipidemia type  -     Lipid Panel; Future  -     Lipid Panel    4. History of stroke    5. Type 2 diabetes mellitus with stage 2 chronic kidney disease, without long-term current use of insulin (HCC)  -     Comprehensive Metabolic Panel; Future  -     Hemoglobin A1c; Future  -     Microalbumin / Creatinine Urine Ratio - Urine, Clean Catch; Future  -     Vitamin B12; Future  -     TSH; Future  -     Comprehensive Metabolic Panel  -     Hemoglobin A1c  -     Microalbumin / Creatinine Urine Ratio - Urine, Clean Catch  -     Vitamin B12  -     TSH    6. Chest tightness    7. MONGE (dyspnea on exertion)    8. Mass on back    9. Migraine with aura and without status migrainosus, not intractable    10. Renal cyst    11. Depression, unspecified depression type    12. Gastroesophageal reflux disease, unspecified whether esophagitis present  -     Magnesium; Future  -     Magnesium    13. Class 1 obesity due to excess calories with serious comorbidity and body mass index (BMI) of 30.0 to 30.9 in adult    14. Chronic pain of right knee    15. Chronic low back pain, unspecified back pain laterality, unspecified whether sciatica present    16. Cyst of right ovary    Other orders  -     Cancel: Pneumococcal Conjugate Vaccine 20-Valent (PCV20)             Follow Up   Return in about 3 months (around 6/23/2023).  Patient was given instructions and counseling regarding her condition or for health maintenance advice. Please see specific information pulled into the AVS if appropriate.     Transcribed from ambient dictation for Aubree Alas MD by Nanette Brooks.  03/23/23   15:28  EDT    Patient or patient representative verbalized consent to the visit recording.  I have personally performed the services described in this document as transcribed by the above individual, and it is both accurate and complete.

## 2023-03-24 ENCOUNTER — TELEPHONE (OUTPATIENT)
Dept: FAMILY MEDICINE CLINIC | Facility: CLINIC | Age: 68
End: 2023-03-24
Payer: MEDICARE

## 2023-03-24 RX ORDER — METOPROLOL TARTRATE 100 MG/1
TABLET ORAL
Qty: 180 TABLET | Refills: 3 | Status: SHIPPED | OUTPATIENT
Start: 2023-03-24

## 2023-03-24 RX ORDER — GLIPIZIDE 5 MG/1
TABLET ORAL
Qty: 180 TABLET | Refills: 1 | Status: SHIPPED | OUTPATIENT
Start: 2023-03-24

## 2023-03-24 NOTE — TELEPHONE ENCOUNTER
HUB TO READ:  ----- Message from Aubree Alas MD sent at 3/24/2023  6:54 AM EDT -----  Glucose was 101 with A1c's showing good control of diabetes at 6.1.  Vitamin B12 is low so increase of 1000 units daily over-the-counter call if any other questions or concerns

## 2023-03-24 NOTE — TELEPHONE ENCOUNTER
She checked the pill bottle and it is the metoprolol tartrate.  I will fix the chart once you advise.

## 2023-03-24 NOTE — PROGRESS NOTES
Glucose was 101 with A1c's showing good control of diabetes at 6.1.  Vitamin B12 is low so increase of 1000 units daily over-the-counter call if any other questions or concerns

## 2023-03-27 ENCOUNTER — TELEPHONE (OUTPATIENT)
Dept: FAMILY MEDICINE CLINIC | Facility: CLINIC | Age: 68
End: 2023-03-27
Payer: MEDICARE

## 2023-03-27 NOTE — TELEPHONE ENCOUNTER
----- Message from Aubree Alas MD sent at 3/24/2023  1:01 PM EDT -----  Please have patient see if they cover Repatha  or Leqvio  ----- Message -----  From: Lizzy Taylor  Sent: 3/24/2023  10:05 AM EDT  To: Aubree Alas MD

## 2023-03-30 RX ORDER — GABAPENTIN 600 MG/1
TABLET ORAL
Qty: 180 TABLET | Refills: 0 | Status: SHIPPED | OUTPATIENT
Start: 2023-03-30

## 2023-04-04 ENCOUNTER — LAB (OUTPATIENT)
Dept: LAB | Facility: HOSPITAL | Age: 68
End: 2023-04-04
Payer: MEDICARE

## 2023-04-04 ENCOUNTER — PRE-ADMISSION TESTING (OUTPATIENT)
Dept: PREADMISSION TESTING | Facility: HOSPITAL | Age: 68
End: 2023-04-04
Payer: MEDICARE

## 2023-04-04 ENCOUNTER — HOSPITAL ENCOUNTER (OUTPATIENT)
Dept: CARDIOLOGY | Facility: HOSPITAL | Age: 68
Discharge: HOME | End: 2023-04-04
Payer: MEDICARE

## 2023-04-04 ENCOUNTER — HOSPITAL ENCOUNTER (OUTPATIENT)
Dept: GENERAL RADIOLOGY | Facility: HOSPITAL | Age: 68
Discharge: HOME | End: 2023-04-04
Payer: MEDICARE

## 2023-04-04 VITALS
HEIGHT: 60 IN | RESPIRATION RATE: 12 BRPM | WEIGHT: 155.8 LBS | BODY MASS INDEX: 30.59 KG/M2 | DIASTOLIC BLOOD PRESSURE: 92 MMHG | HEART RATE: 77 BPM | SYSTOLIC BLOOD PRESSURE: 163 MMHG | TEMPERATURE: 99.1 F | OXYGEN SATURATION: 99 %

## 2023-04-04 LAB
ABO GROUP BLD: NORMAL
ALBUMIN SERPL-MCNC: 4.8 G/DL (ref 3.5–5.2)
ALBUMIN/GLOB SERPL: 2.8 G/DL
ALP SERPL-CCNC: 95 U/L (ref 39–117)
ALT SERPL W P-5'-P-CCNC: 10 U/L (ref 1–33)
ANION GAP SERPL CALCULATED.3IONS-SCNC: 12.8 MMOL/L (ref 5–15)
APTT PPP: 24.6 SECONDS (ref 24–31)
AST SERPL-CCNC: 13 U/L (ref 1–32)
BACTERIA UR QL AUTO: NORMAL /HPF
BASOPHILS # BLD AUTO: 0.04 10*3/MM3 (ref 0–0.2)
BASOPHILS NFR BLD AUTO: 0.7 % (ref 0–1.5)
BILIRUB SERPL-MCNC: 0.4 MG/DL (ref 0–1.2)
BILIRUB UR QL STRIP: NEGATIVE
BLD GP AB SCN SERPL QL: NEGATIVE
BUN SERPL-MCNC: 11 MG/DL (ref 8–23)
BUN/CREAT SERPL: 11.7 (ref 7–25)
CALCIUM SPEC-SCNC: 9.9 MG/DL (ref 8.6–10.5)
CHLORIDE SERPL-SCNC: 101 MMOL/L (ref 98–107)
CLARITY UR: CLEAR
CO2 SERPL-SCNC: 26.2 MMOL/L (ref 22–29)
COLOR UR: YELLOW
CREAT SERPL-MCNC: 0.94 MG/DL (ref 0.57–1)
DEPRECATED RDW RBC AUTO: 42.1 FL (ref 37–54)
EGFRCR SERPLBLD CKD-EPI 2021: 67.1 ML/MIN/1.73
EOSINOPHIL # BLD AUTO: 0.08 10*3/MM3 (ref 0–0.4)
EOSINOPHIL NFR BLD AUTO: 1.5 % (ref 0.3–6.2)
ERYTHROCYTE [DISTWIDTH] IN BLOOD BY AUTOMATED COUNT: 12.7 % (ref 12.3–15.4)
GLOBULIN UR ELPH-MCNC: 1.7 GM/DL
GLUCOSE SERPL-MCNC: 104 MG/DL (ref 65–99)
GLUCOSE UR STRIP-MCNC: NEGATIVE MG/DL
HBA1C MFR BLD: 6 % (ref 4.8–5.6)
HCT VFR BLD AUTO: 37.5 % (ref 34–46.6)
HGB BLD-MCNC: 12.8 G/DL (ref 12–15.9)
HGB UR QL STRIP.AUTO: NEGATIVE
HYALINE CASTS UR QL AUTO: NORMAL /LPF
IMM GRANULOCYTES # BLD AUTO: 0.02 10*3/MM3 (ref 0–0.05)
IMM GRANULOCYTES NFR BLD AUTO: 0.4 % (ref 0–0.5)
INR PPP: 0.97 (ref 0.93–1.1)
KETONES UR QL STRIP: NEGATIVE
LEUKOCYTE ESTERASE UR QL STRIP.AUTO: ABNORMAL
LYMPHOCYTES # BLD AUTO: 1.72 10*3/MM3 (ref 0.7–3.1)
LYMPHOCYTES NFR BLD AUTO: 31.7 % (ref 19.6–45.3)
MCH RBC QN AUTO: 30.4 PG (ref 26.6–33)
MCHC RBC AUTO-ENTMCNC: 34.1 G/DL (ref 31.5–35.7)
MCV RBC AUTO: 89.1 FL (ref 79–97)
MONOCYTES # BLD AUTO: 0.45 10*3/MM3 (ref 0.1–0.9)
MONOCYTES NFR BLD AUTO: 8.3 % (ref 5–12)
MRSA DNA SPEC QL NAA+PROBE: NORMAL
NEUTROPHILS NFR BLD AUTO: 3.12 10*3/MM3 (ref 1.7–7)
NEUTROPHILS NFR BLD AUTO: 57.4 % (ref 42.7–76)
NITRITE UR QL STRIP: NEGATIVE
NRBC BLD AUTO-RTO: 0 /100 WBC (ref 0–0.2)
PH UR STRIP.AUTO: 6 [PH] (ref 5–8)
PLATELET # BLD AUTO: 241 10*3/MM3 (ref 140–450)
PMV BLD AUTO: 10.5 FL (ref 6–12)
POTASSIUM SERPL-SCNC: 4.5 MMOL/L (ref 3.5–5.2)
PROT SERPL-MCNC: 6.5 G/DL (ref 6–8.5)
PROT UR QL STRIP: NEGATIVE
PROTHROMBIN TIME: 10 SECONDS (ref 9.6–11.7)
RBC # BLD AUTO: 4.21 10*6/MM3 (ref 3.77–5.28)
RBC # UR STRIP: NORMAL /HPF
REF LAB TEST METHOD: NORMAL
RH BLD: POSITIVE
SODIUM SERPL-SCNC: 140 MMOL/L (ref 136–145)
SP GR UR STRIP: 1.01 (ref 1–1.03)
SQUAMOUS #/AREA URNS HPF: NORMAL /HPF
T&S EXPIRATION DATE: NORMAL
UROBILINOGEN UR QL STRIP: ABNORMAL
WBC # UR STRIP: NORMAL /HPF
WBC NRBC COR # BLD: 5.43 10*3/MM3 (ref 3.4–10.8)

## 2023-04-04 PROCEDURE — 85730 THROMBOPLASTIN TIME PARTIAL: CPT

## 2023-04-04 PROCEDURE — 97162 PT EVAL MOD COMPLEX 30 MIN: CPT

## 2023-04-04 PROCEDURE — 71046 X-RAY EXAM CHEST 2 VIEWS: CPT

## 2023-04-04 PROCEDURE — 93005 ELECTROCARDIOGRAM TRACING: CPT | Performed by: ORTHOPAEDIC SURGERY

## 2023-04-04 PROCEDURE — 83036 HEMOGLOBIN GLYCOSYLATED A1C: CPT

## 2023-04-04 PROCEDURE — 81001 URINALYSIS AUTO W/SCOPE: CPT

## 2023-04-04 PROCEDURE — 86850 RBC ANTIBODY SCREEN: CPT

## 2023-04-04 PROCEDURE — 85025 COMPLETE CBC W/AUTO DIFF WBC: CPT

## 2023-04-04 PROCEDURE — 87641 MR-STAPH DNA AMP PROBE: CPT

## 2023-04-04 PROCEDURE — 86900 BLOOD TYPING SEROLOGIC ABO: CPT

## 2023-04-04 PROCEDURE — 36415 COLL VENOUS BLD VENIPUNCTURE: CPT

## 2023-04-04 PROCEDURE — 85610 PROTHROMBIN TIME: CPT

## 2023-04-04 PROCEDURE — 93010 ELECTROCARDIOGRAM REPORT: CPT | Performed by: INTERNAL MEDICINE

## 2023-04-04 PROCEDURE — 80053 COMPREHEN METABOLIC PANEL: CPT

## 2023-04-04 PROCEDURE — 86901 BLOOD TYPING SEROLOGIC RH(D): CPT

## 2023-04-04 RX ORDER — FENOFIBRATE 160 MG/1
1 TABLET ORAL
COMMUNITY

## 2023-04-04 NOTE — THERAPY EVALUATION
Patient Name: Odalis Solo  : 1955    MRN: [id number]                              Today's Date: 2023       Admit Date: (Not on file)    Visit Dx: No diagnosis found.  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   • DDD (degenerative disc disease), cervical   • Family history of diabetes mellitus   • Hyperlipidemia   • Leg length discrepancy   • Status post hip replacement   • History of stroke   • Tear of acetabular labrum   • Type 2 diabetes mellitus (HCC)   • Lumbar radiculopathy   • Cervical radiculopathy   • Cervical spondylosis   • Complete tear of right rotator cuff   • Urine frequency   • Suspected COVID-19 virus infection   • Ataxic gait   • Chest tightness   • Chiari malformation   • MONGE (dyspnea on exertion)   • Hypermetropia   • Lumbago with sciatica   • Migraine with aura   • Myalgia   • Nuclear sclerosis   • Presbyopia   • Arthrodesis status   • Anxiety   • Depression   • GERD (gastroesophageal reflux disease)   • Headache   • Weakness   • Chronic pain of right knee   • Class 1 obesity due to excess calories with serious comorbidity and body mass index (BMI) of 30.0 to 30.9 in adult   • Transient cerebral ischemia   • Cyst of right ovary   • Anemia, unspecified   • Diarrhea   • Diverticulitis   • Dysphagia   • High blood pressure   • Irritable bowel syndrome   • Lower abdominal pain, unspecified   • Nausea   • Rectal bleeding   • Chronic low back pain     Past Medical History:   Diagnosis Date   • Anemia     hx   • Anxiety    • Chiari syndrome     hx   • Chronic renal failure (CRF), stage 3 (moderate)    • DDD (degenerative disc disease), lumbar    • Depression    • Diabetes mellitus    • Frequent headaches    • History of narcotic addiction    • Hyperlipidemia    • Hypertension    • Low back pain    • MRSA  infection    • Neuropathy     head   • Peripheral edema    • Sciatic pain, right    • Seasonal allergies    • Stroke 2011    mini   • Suspected COVID-19 virus infection 2021   • Urine frequency 2021     Past Surgical History:   Procedure Laterality Date   • ANTERIOR CERVICAL DISCECTOMY W/ FUSION N/A 2019    Procedure: CERVICAL DISCECTOMY ANTERIOR WITH FUSION, C4-6;  Surgeon: Ian Foley MD;  Location: [facility] MAIN OR;  Service: Neurosurgery   • APPENDECTOMY     • BRAIN SURGERY      total 8   • CARPAL TUNNEL RELEASE     • CERVICAL CHIARI DECOMPRESSION POSTERIOR      x 5 from frontal parietal x  2   •  SECTION     • CHOLECYSTECTOMY      lap   • HIP SURGERY Left 2017    total   • HYSTERECTOMY     • SHOULDER ARTHROSCOPY W/ ROTATOR CUFF REPAIR Right 09/15/2020    Procedure: SHOULDER ARTHROSCOPY WITH  EXTENSIVE DEBRIDEMENT,  SUBACROMINAL DECOMPRESSION, ROTATOR CUFF REPAIR and bicep tenodesis;  Surgeon: Silviano Gong MD;  Location: [facility] MAIN OR;  Service: Orthopedics;  Laterality: Right;   • SHOULDER SURGERY Right 09/15/2020    scope/cuff repair   • TONSILLECTOMY     •  SHUNT INSERTION  10/2022      General Information     Row Name 23 1641          Physical Therapy Time and Intention    Document Type evaluation  -EL     Mode of Treatment individual therapy;physical therapy  -EL     Row Name 23 1641          General Information    Patient Profile Reviewed yes  -EL     Prior Level of Function independent:;all household mobility;ADL's;gait  -EL     Row Name 23 1641          Living Environment    People in Home alone;other (see comments)  will see if sister can stay with her following sx  -EL     Row Name 23 1641          Home Main Entrance    Number of Stairs, Main Entrance none  -EL     Row Name 23 1641          Stairs Within Home, Primary    Stairs, Within Home, Primary patio home  -EL     Number of Stairs, Within Home, Primary none  -EL      [facility] Name 04/04/23 1641          Cognition    Orientation Status (Cognition) oriented x 4  -EL     [facility] Name 04/04/23 1641          Safety Issues, Functional Mobility    Impairments Affecting Function (Mobility) range of motion (ROM);pain  -EL           User Key  (r) = Recorded By, (t) = Taken By, (c) = Cosigned By    Initials Name Provider Type    EL Radhames Best, PT Physical Therapist               Mobility     [facility] Name 04/04/23 1642          Sit-Stand Transfer    Sit-Stand [place] (Transfers) independent  -EL     Row Name 04/04/23 1642          Gait/Stairs (Locomotion)    [place] Level (Gait) independent  -EL     Distance in Feet (Gait) 50  -EL     Deviations/Abnormal Patterns (Gait) gait speed decreased;antalgic  -EL     Left Sided Gait Deviations weight shift ability decreased  -EL     Comment, (Gait/Stairs) c/o significant pain in knee and back with mobility  -EL     Row Name 04/04/23 1642          Mobility    Extremity Weight-bearing Status right lower extremity  -EL     Right Lower Extremity (Weight-bearing Status) weight-bearing as tolerated (WBAT)  -EL           User Key  (r) = Recorded By, (t) = Taken By, (c) = Cosigned By    Initials Name Provider Type    EL Radhames Best, PT Physical Therapist               Obj/Interventions     Row Name 04/04/23 1643          Range of Motion Comprehensive    General Range of Motion lower extremity range of motion deficits identified  -EL     Comment, General Range of Motion RLE 0-95* with pain at end range flexion  -EL     Row Name 04/04/23 1643          Strength Comprehensive (MMT)    General Manual Muscle Testing (MMT) Assessment lower extremity strength deficits identified  -EL     Row Name 04/04/23 1643          Sensory Assessment (Somatosensory)    Sensory Assessment (Somatosensory) sensation intact  -EL           User Key  (r) = Recorded By, (t) = Taken By, (c) = Cosigned By    Initials Name Provider Type    Radhames Lara, PT Physical Therapist                Goals/Plan     Row Name 04/04/23 1644          Bed Mobility Goal 1 (PT)    Activity/Assistive Device (Bed Mobility Goal 1, PT) bed mobility activities, all  -EL     [place] Level/Cues Needed (Bed Mobility Goal 1, PT) modified independence  -EL     Time Frame (Bed Mobility Goal 1, PT) long term goal (LTG);2 weeks  -EL     Row Name 04/04/23 1644          Transfer Goal 1 (PT)    Activity/Assistive Device (Transfer Goal 1, PT) transfers, all;walker, rolling  -EL     [place] Level/Cues Needed (Transfer Goal 1, PT) modified independence  -EL     Time Frame (Transfer Goal 1, PT) long term goal (LTG);2 weeks  -EL     Row Name 04/04/23 1644          Gait Training Goal 1 (PT)    Activity/Assistive Device (Gait Training Goal 1, PT) gait (walking locomotion);walker, rolling  -EL     [place] Level (Gait Training Goal 1, PT) modified independence  -EL     Distance (Gait Training Goal 1, PT) 150  -EL     Time Frame (Gait Training Goal 1, PT) long term goal (LTG);2 weeks  -EL     Row Name 04/04/23 1644          Problem Specific Goal 1 (PT)    Problem Specific Goal 1 (PT) Demonstrate independence with post op HEP  -EL     Time Frame (Problem Specific Goal 1, PT) long-term goal (LTG);2 weeks  -EL     Row Name 04/04/23 1644          Therapy Assessment/Plan (PT)    Planned Therapy Interventions (PT) balance training;neuromuscular re-education;bed mobility training;transfer training;gait training;patient/family education;strengthening  -EL           User Key  (r) = Recorded By, (t) = Taken By, (c) = Cosigned By    Initials Name Provider Type    Radhames Lara, PT Physical Therapist               Clinical Impression     Row Name 04/04/23 1643          Pain    Pretreatment Pain Rating 10/10  -EL     Posttreatment Pain Rating 10/10  -EL     Pain Location - Side/Orientation Right  -EL     Pain Location - knee  -EL     Row Name 04/04/23 1643          Plan of Care Review    Plan of Care Reviewed With patient  -EL     Outcome  Evaluation Pt is a 68 YO F scheduled for R TKA with Dr. Moore on 4/13. Pt states she lives home alone in a patio home but will see if sister can stay with her at d/c. Pt states she typically is independent with all ADLs, ambulation without AD and c/o near falls due to knee buckling. Pt educated on therapy process, precautions, and assisted with equipment and d/c needs. Pt demonstrates good understanding of education and had all questions answered to satisfaction. PT demonstrated post op HEP and pt verbalizes understanding. Recommendation is return home with family assistance and HHPT. Pt will require a RWx at d/c.  -EL     Row Name 04/04/23 1643          Therapy Assessment/Plan (PT)    Rehab Potential (PT) good, to achieve stated therapy goals  -EL     Criteria for Skilled Interventions Met (PT) yes  -EL     Therapy Frequency (PT) 2 times/day  -EL     Predicted Duration of Therapy Intervention (PT) Until d/c  -EL           User Key  (r) = Recorded By, (t) = Taken By, (c) = Cosigned By    Initials Name Provider Type    EL Radhames Best, PT Physical Therapist               Outcome Measures     Row Name 04/04/23 6114          How much help from another person do you currently need...    Turning from your back to your side while in flat bed without using bedrails? 4  -EL     Moving from lying on back to sitting on the side of a flat bed without bedrails? 4  -EL     Moving to and from a bed to a chair (including a wheelchair)? 4  -EL     Standing up from a chair using your arms (e.g., wheelchair, bedside chair)? 4  -EL     Climbing 3-5 steps with a railing? 3  -EL     To walk in hospital room? 4  -EL     AM-PAC 6 Clicks Score (PT) 23  -EL     Highest level of mobility 7 --> Walked 25 feet or more  -EL     Row Name 04/04/23 4448          Functional Assessment    Outcome Measure Options AM-PAC 6 Clicks Basic Mobility (PT)  -EL           User Key  (r) = Recorded By, (t) = Taken By, (c) = Cosigned By    Initials Name Provider  Type    Radhames Lara PT Physical Therapist                             Physical Therapy Education     Title: PT OT SLP Therapies (Done)     Topic: Physical Therapy (Done)     Point: Mobility training (Done)     Learning Progress Summary           Patient Acceptance, E,TB, VU by  at 4/4/2023 1645                   Point: Precautions (Done)     Learning Progress Summary           Patient Acceptance, E,TB, VU by EL at 4/4/2023 1645                               User Key     Initials Effective Dates Name Provider Type Discipline     06/23/20 -  Radhames Best, PT Physical Therapist PT              PT Recommendation and Plan  Planned Therapy Interventions (PT): balance training, neuromuscular re-education, bed mobility training, transfer training, gait training, patient/family education, strengthening  Plan of Care Reviewed With: patient  Outcome Evaluation: Pt is a 66 YO F scheduled for R TKA with Dr. Moore on 4/13. Pt states she lives home alone in a patio home but will see if sister can stay with her at d/c. Pt states she typically is independent with all ADLs, ambulation without AD and c/o near falls due to knee buckling. Pt educated on therapy process, precautions, and assisted with equipment and d/c needs. Pt demonstrates good understanding of education and had all questions answered to satisfaction. PT demonstrated post op HEP and pt verbalizes understanding. Recommendation is return home with family assistance and HHPT. Pt will require a RWx at d/c.     Time Calculation:    PT Charges     Row Name 04/04/23 1646             Time Calculation    Start Time 1115  -EL      Stop Time 1147  -EL      Time Calculation (min) 32 min  -EL      PT Received On 04/04/23  -EL      PT - Next Appointment 04/13/23  -EL      PT Goal Re-Cert Due Date 04/18/23  -            User Key  (r) = Recorded By, (t) = Taken By, (c) = Cosigned By    Initials Name Provider Type    Radhames Lara PT Physical Therapist              Therapy  Charges for Today     Code Description Service Date Service Provider Modifiers Qty    55183505616 HC PT EVAL MOD COMPLEXITY 4 4/4/2023 Radhames Best, PT GP 1          PT G-Codes  Outcome Measure Options: AM-PAC 6 Clicks Basic Mobility (PT)  AM-PAC 6 Clicks Score (PT): 23  PT Discharge Summary  Anticipated Discharge Disposition (PT): home with home health, home with assist    Radhames Best, PT  4/4/2023

## 2023-04-04 NOTE — PLAN OF CARE
Goal Outcome Evaluation:  Plan of Care Reviewed With: patient           Outcome Evaluation: Pt is a 68 YO F scheduled for R TKA with Dr. Moore on 4/13. Pt states she lives home alone in a patio home but will see if sister can stay with her at d/c. Pt states she typically is independent with all ADLs, ambulation without AD and c/o near falls due to knee buckling. Pt educated on therapy process, precautions, and assisted with equipment and d/c needs. Pt demonstrates good understanding of education and had all questions answered to satisfaction. PT demonstrated post op HEP and pt verbalizes understanding. Recommendation is return home with family assistance and HHPT. Pt will require a RWx at d/c.

## 2023-04-07 LAB — QT INTERVAL: 391 MS

## 2023-04-12 ENCOUNTER — ANESTHESIA EVENT (OUTPATIENT)
Dept: PERIOP | Facility: HOSPITAL | Age: 68
End: 2023-04-12
Payer: MEDICARE

## 2023-04-12 NOTE — ANESTHESIA PREPROCEDURE EVALUATION
Anesthesia Evaluation     Patient summary reviewed and Nursing notes reviewed   no history of anesthetic complications:  NPO Solid Status: > 8 hours  NPO Liquid Status: > 2 hours           Airway   Dental      Pulmonary    (+) a smoker Former,   Cardiovascular     ECG reviewed  PT is on anticoagulation therapy  Patient on routine beta blocker    (+) hypertension, MONGE, hyperlipidemia,       Neuro/Psych  (+) CVA, headaches, weakness, numbness, psychiatric history Anxiety and Depression,    GI/Hepatic/Renal/Endo    (+) obesity,  GERD, GI bleeding , renal disease CRI, diabetes mellitus,     Musculoskeletal     (+) back pain, chronic pain, gait problem, myalgias, neck pain, radiculopathy  Abdominal    Substance History      OB/GYN          Other   arthritis,      ROS/Med Hx Other: Additional History:  Chiari malformation, chest tightness, sciatica, abd pain, nausea, IBS, dysphagia, diverticulitis, edema, neuropathy    PSH:  BRAIN SURGERY  SECTION  APPENDECTOMY CARPAL TUNNEL RELEASE  HIP SURGERY TONSILLECTOMY  HYSTERECTOMY ANTERIOR CERVICAL DISCECTOMY W/ FUSION  SHOULDER SURGERY SHOULDER ARTHROSCOPY W/ ROTATOR CUFF REPAIR  CERVICAL CHIARI DECOMPRESSION POSTERIOR  SHUNT INSERTION  CHOLECYSTECTOMY                   Anesthesia Plan    ASA 3     general and ERAS Protocol   total IV anesthesia  Reason for not using neuraxial anesthesia or peripheral nerve block: Surgeon Refused  (Patient identified; pre-operative vital signs, all relevant labs/studies, complete medical/surgical/anesthetic history, full medication list, full allergy list, and NPO status obtained/reviewed; physical assessment performed; anesthetic options, side effects, potential complications, risks, and benefits discussed; questions answered; written anesthesia consent obtained; patient cleared for procedure; anesthesia machine and equipment checked and functioning)  intravenous induction     Anesthetic plan, risks, benefits, and alternatives have  been provided, discussed and informed consent has been obtained with: patient.    Plan discussed with CRNA and CAA.        CODE STATUS:

## 2023-04-13 ENCOUNTER — APPOINTMENT (OUTPATIENT)
Dept: GENERAL RADIOLOGY | Facility: HOSPITAL | Age: 68
End: 2023-04-13
Payer: MEDICARE

## 2023-04-13 ENCOUNTER — ANESTHESIA (OUTPATIENT)
Dept: PERIOP | Facility: HOSPITAL | Age: 68
End: 2023-04-13
Payer: MEDICARE

## 2023-04-13 ENCOUNTER — HOSPITAL ENCOUNTER (OUTPATIENT)
Facility: HOSPITAL | Age: 68
Discharge: HOME | End: 2023-04-14
Attending: ORTHOPAEDIC SURGERY | Admitting: ORTHOPAEDIC SURGERY
Payer: MEDICARE

## 2023-04-13 DIAGNOSIS — Z96.651 STATUS POST RIGHT KNEE REPLACEMENT: Primary | ICD-10-CM

## 2023-04-13 PROBLEM — Z96.659 STATUS POST KNEE REPLACEMENT: Status: ACTIVE | Noted: 2023-04-13

## 2023-04-13 LAB
GLUCOSE BLDC GLUCOMTR-MCNC: 103 MG/DL (ref 70–105)
GLUCOSE BLDC GLUCOMTR-MCNC: 118 MG/DL (ref 70–105)
GLUCOSE BLDC GLUCOMTR-MCNC: 185 MG/DL (ref 70–105)

## 2023-04-13 PROCEDURE — 25010000002 EPINEPHRINE 1 MG/ML SOLUTION: Performed by: ORTHOPAEDIC SURGERY

## 2023-04-13 PROCEDURE — 73560 X-RAY EXAM OF KNEE 1 OR 2: CPT

## 2023-04-13 PROCEDURE — 63710000001 ACETAMINOPHEN 500 MG TABLET: Performed by: ANESTHESIOLOGY

## 2023-04-13 PROCEDURE — 25010000002 KETOROLAC TROMETHAMINE PER 15 MG: Performed by: ORTHOPAEDIC SURGERY

## 2023-04-13 PROCEDURE — 97164 PT RE-EVAL EST PLAN CARE: CPT

## 2023-04-13 PROCEDURE — A9270 NON-COVERED ITEM OR SERVICE: HCPCS | Performed by: ANESTHESIOLOGY

## 2023-04-13 PROCEDURE — 25010000002 HYDROMORPHONE 1 MG/ML SOLUTION: Performed by: ORTHOPAEDIC SURGERY

## 2023-04-13 PROCEDURE — A9270 NON-COVERED ITEM OR SERVICE: HCPCS | Performed by: ORTHOPAEDIC SURGERY

## 2023-04-13 PROCEDURE — G0378 HOSPITAL OBSERVATION PER HR: HCPCS

## 2023-04-13 PROCEDURE — 25010000002 HYDROMORPHONE 1 MG/ML SOLUTION

## 2023-04-13 PROCEDURE — 63710000001 DULOXETINE 30 MG CAPSULE DELAYED-RELEASE PARTICLES: Performed by: ORTHOPAEDIC SURGERY

## 2023-04-13 PROCEDURE — 25010000002 ONDANSETRON PER 1 MG

## 2023-04-13 PROCEDURE — 63710000001 LOSARTAN 50 MG TABLET: Performed by: ORTHOPAEDIC SURGERY

## 2023-04-13 PROCEDURE — 25010000002 FENTANYL CITRATE (PF) 100 MCG/2ML SOLUTION

## 2023-04-13 PROCEDURE — 25010000002 MIDAZOLAM PER 1 MG

## 2023-04-13 PROCEDURE — 25010000002 CEFAZOLIN PER 500 MG: Performed by: ORTHOPAEDIC SURGERY

## 2023-04-13 PROCEDURE — 63710000001 GABAPENTIN 600 MG TABLET: Performed by: ORTHOPAEDIC SURGERY

## 2023-04-13 PROCEDURE — 63710000001 MELOXICAM 15 MG TABLET: Performed by: ORTHOPAEDIC SURGERY

## 2023-04-13 PROCEDURE — C1776 JOINT DEVICE (IMPLANTABLE): HCPCS | Performed by: ORTHOPAEDIC SURGERY

## 2023-04-13 PROCEDURE — 63710000001 OXYCODONE 10 MG TABLET EXTENDED-RELEASE 12 HOUR: Performed by: ANESTHESIOLOGY

## 2023-04-13 PROCEDURE — 82962 GLUCOSE BLOOD TEST: CPT

## 2023-04-13 PROCEDURE — 27447 TOTAL KNEE ARTHROPLASTY: CPT

## 2023-04-13 PROCEDURE — 63710000001 GABAPENTIN 300 MG CAPSULE: Performed by: ANESTHESIOLOGY

## 2023-04-13 PROCEDURE — 25010000002 ROPIVACAINE PER 1 MG: Performed by: ORTHOPAEDIC SURGERY

## 2023-04-13 PROCEDURE — 25010000002 CLONIDINE PER 1 MG: Performed by: ORTHOPAEDIC SURGERY

## 2023-04-13 PROCEDURE — 25010000002 PROPOFOL 200 MG/20ML EMULSION

## 2023-04-13 PROCEDURE — C1713 ANCHOR/SCREW BN/BN,TIS/BN: HCPCS | Performed by: ORTHOPAEDIC SURGERY

## 2023-04-13 PROCEDURE — 97162 PT EVAL MOD COMPLEX 30 MIN: CPT

## 2023-04-13 PROCEDURE — 25010000002 HYDRALAZINE PER 20 MG

## 2023-04-13 PROCEDURE — 63710000001 OXYCODONE 5 MG TABLET: Performed by: ORTHOPAEDIC SURGERY

## 2023-04-13 PROCEDURE — 25010000002 MAGNESIUM SULFATE PER 500 MG OF MAGNESIUM

## 2023-04-13 PROCEDURE — 63710000001 METOPROLOL TARTRATE 25 MG TABLET: Performed by: ORTHOPAEDIC SURGERY

## 2023-04-13 DEVICE — IMPLANTABLE DEVICE: Type: IMPLANTABLE DEVICE | Site: KNEE | Status: FUNCTIONAL

## 2023-04-13 DEVICE — JOURNEY II BCS FEMORAL OXINIUM                                    RIGHT SIZE 2
Type: IMPLANTABLE DEVICE | Site: KNEE | Status: FUNCTIONAL
Brand: JOURNEY

## 2023-04-13 DEVICE — JOURNEY TIBIAL BASEPLATE NONPOROUS                                    RT SZ 1
Type: IMPLANTABLE DEVICE | Site: KNEE | Status: FUNCTIONAL
Brand: JOURNEY

## 2023-04-13 DEVICE — DEV WND/CLS CONTRL TISS STRATAFIX SYMM PDS PLS CTX 60CM VIL: Type: IMPLANTABLE DEVICE | Site: KNEE | Status: FUNCTIONAL

## 2023-04-13 DEVICE — CMT BONE PALACOS R HI/VISC 1X40: Type: IMPLANTABLE DEVICE | Site: KNEE | Status: FUNCTIONAL

## 2023-04-13 DEVICE — JOURNEY II BCS XLPE ARTICULAR                                    INSERT SIZE 1-2 RIGHT 11MM
Type: IMPLANTABLE DEVICE | Site: KNEE | Status: FUNCTIONAL
Brand: JOURNEY

## 2023-04-13 DEVICE — DEV CONTRL TISS STRATAFIX SPIRAL MNCRYL UD 3/0 PLS 30CM: Type: IMPLANTABLE DEVICE | Site: KNEE | Status: FUNCTIONAL

## 2023-04-13 DEVICE — JOURNEY 7.5 ROUND RESURF PAT 29MM STANDARD
Type: IMPLANTABLE DEVICE | Site: KNEE | Status: FUNCTIONAL
Brand: JOURNEY

## 2023-04-13 RX ORDER — OXYCODONE HYDROCHLORIDE 5 MG/1
5 TABLET ORAL ONCE AS NEEDED
Status: DISCONTINUED | OUTPATIENT
Start: 2023-04-13 | End: 2023-04-13 | Stop reason: HOSPADM

## 2023-04-13 RX ORDER — OXYCODONE HYDROCHLORIDE 5 MG/1
5 TABLET ORAL EVERY 4 HOURS PRN
Status: DISCONTINUED | OUTPATIENT
Start: 2023-04-13 | End: 2023-04-14 | Stop reason: HOSPADM

## 2023-04-13 RX ORDER — MAGNESIUM SULFATE HEPTAHYDRATE 500 MG/ML
INJECTION, SOLUTION INTRAMUSCULAR; INTRAVENOUS AS NEEDED
Status: DISCONTINUED | OUTPATIENT
Start: 2023-04-13 | End: 2023-04-13 | Stop reason: SURG

## 2023-04-13 RX ORDER — SODIUM CHLORIDE, SODIUM LACTATE, POTASSIUM CHLORIDE, CALCIUM CHLORIDE 600; 310; 30; 20 MG/100ML; MG/100ML; MG/100ML; MG/100ML
9 INJECTION, SOLUTION INTRAVENOUS CONTINUOUS PRN
Status: DISCONTINUED | OUTPATIENT
Start: 2023-04-13 | End: 2023-04-13 | Stop reason: HOSPADM

## 2023-04-13 RX ORDER — DULOXETIN HYDROCHLORIDE 30 MG/1
60 CAPSULE, DELAYED RELEASE ORAL DAILY
Status: DISCONTINUED | OUTPATIENT
Start: 2023-04-13 | End: 2023-04-14 | Stop reason: HOSPADM

## 2023-04-13 RX ORDER — SODIUM CHLORIDE 9 MG/ML
100 INJECTION, SOLUTION INTRAVENOUS CONTINUOUS
Status: DISCONTINUED | OUTPATIENT
Start: 2023-04-13 | End: 2023-04-14 | Stop reason: HOSPADM

## 2023-04-13 RX ORDER — NICOTINE POLACRILEX 4 MG
15 LOZENGE BUCCAL
Status: DISCONTINUED | OUTPATIENT
Start: 2023-04-13 | End: 2023-04-14 | Stop reason: HOSPADM

## 2023-04-13 RX ORDER — LORAZEPAM 2 MG/ML
1 INJECTION INTRAMUSCULAR
Status: DISCONTINUED | OUTPATIENT
Start: 2023-04-13 | End: 2023-04-13 | Stop reason: HOSPADM

## 2023-04-13 RX ORDER — LIDOCAINE HYDROCHLORIDE 10 MG/ML
INJECTION, SOLUTION EPIDURAL; INFILTRATION; INTRACAUDAL; PERINEURAL AS NEEDED
Status: DISCONTINUED | OUTPATIENT
Start: 2023-04-13 | End: 2023-04-13 | Stop reason: SURG

## 2023-04-13 RX ORDER — AMLODIPINE BESYLATE 5 MG/1
1 TABLET ORAL DAILY
COMMUNITY

## 2023-04-13 RX ORDER — IPRATROPIUM BROMIDE AND ALBUTEROL SULFATE 2.5; .5 MG/3ML; MG/3ML
3 SOLUTION RESPIRATORY (INHALATION) ONCE AS NEEDED
Status: DISCONTINUED | OUTPATIENT
Start: 2023-04-13 | End: 2023-04-13 | Stop reason: HOSPADM

## 2023-04-13 RX ORDER — TRANEXAMIC ACID 10 MG/ML
1000 INJECTION, SOLUTION INTRAVENOUS ONCE
Status: COMPLETED | OUTPATIENT
Start: 2023-04-13 | End: 2023-04-13

## 2023-04-13 RX ORDER — DEXTROSE MONOHYDRATE 25 G/50ML
25 INJECTION, SOLUTION INTRAVENOUS
Status: DISCONTINUED | OUTPATIENT
Start: 2023-04-13 | End: 2023-04-14 | Stop reason: HOSPADM

## 2023-04-13 RX ORDER — DIPHENHYDRAMINE HYDROCHLORIDE 50 MG/ML
12.5 INJECTION INTRAMUSCULAR; INTRAVENOUS ONCE AS NEEDED
Status: DISCONTINUED | OUTPATIENT
Start: 2023-04-13 | End: 2023-04-13 | Stop reason: HOSPADM

## 2023-04-13 RX ORDER — EPHEDRINE SULFATE 5 MG/ML
5 INJECTION INTRAVENOUS ONCE AS NEEDED
Status: DISCONTINUED | OUTPATIENT
Start: 2023-04-13 | End: 2023-04-13 | Stop reason: HOSPADM

## 2023-04-13 RX ORDER — SODIUM CHLORIDE 0.9 % (FLUSH) 0.9 %
10 SYRINGE (ML) INJECTION AS NEEDED
Status: DISCONTINUED | OUTPATIENT
Start: 2023-04-13 | End: 2023-04-13 | Stop reason: HOSPADM

## 2023-04-13 RX ORDER — GABAPENTIN 600 MG/1
600 TABLET ORAL 2 TIMES DAILY
Status: DISCONTINUED | OUTPATIENT
Start: 2023-04-13 | End: 2023-04-14 | Stop reason: HOSPADM

## 2023-04-13 RX ORDER — FENTANYL CITRATE 50 UG/ML
INJECTION, SOLUTION INTRAMUSCULAR; INTRAVENOUS AS NEEDED
Status: DISCONTINUED | OUTPATIENT
Start: 2023-04-13 | End: 2023-04-13 | Stop reason: SURG

## 2023-04-13 RX ORDER — DIPHENHYDRAMINE HYDROCHLORIDE 50 MG/ML
12.5 INJECTION INTRAMUSCULAR; INTRAVENOUS
Status: DISCONTINUED | OUTPATIENT
Start: 2023-04-13 | End: 2023-04-13 | Stop reason: HOSPADM

## 2023-04-13 RX ORDER — OXYCODONE HYDROCHLORIDE 5 MG/1
10 TABLET ORAL EVERY 4 HOURS PRN
Status: DISCONTINUED | OUTPATIENT
Start: 2023-04-13 | End: 2023-04-14 | Stop reason: HOSPADM

## 2023-04-13 RX ORDER — TRANEXAMIC ACID 10 MG/ML
1000 INJECTION, SOLUTION INTRAVENOUS ONCE
Status: DISCONTINUED | OUTPATIENT
Start: 2023-04-13 | End: 2023-04-13 | Stop reason: HOSPADM

## 2023-04-13 RX ORDER — LOSARTAN POTASSIUM 50 MG/1
50 TABLET ORAL 2 TIMES DAILY
Status: DISCONTINUED | OUTPATIENT
Start: 2023-04-13 | End: 2023-04-14 | Stop reason: HOSPADM

## 2023-04-13 RX ORDER — ACETAMINOPHEN 500 MG
1000 TABLET ORAL ONCE
Status: COMPLETED | OUTPATIENT
Start: 2023-04-13 | End: 2023-04-13

## 2023-04-13 RX ORDER — LABETALOL HYDROCHLORIDE 5 MG/ML
5 INJECTION, SOLUTION INTRAVENOUS
Status: DISCONTINUED | OUTPATIENT
Start: 2023-04-13 | End: 2023-04-13 | Stop reason: HOSPADM

## 2023-04-13 RX ORDER — OXYCODONE HYDROCHLORIDE 5 MG/1
10 TABLET ORAL EVERY 4 HOURS PRN
Status: DISCONTINUED | OUTPATIENT
Start: 2023-04-13 | End: 2023-04-13 | Stop reason: HOSPADM

## 2023-04-13 RX ORDER — MEPERIDINE HYDROCHLORIDE 25 MG/ML
12.5 INJECTION INTRAMUSCULAR; INTRAVENOUS; SUBCUTANEOUS
Status: DISCONTINUED | OUTPATIENT
Start: 2023-04-13 | End: 2023-04-13 | Stop reason: HOSPADM

## 2023-04-13 RX ORDER — ONDANSETRON 4 MG/1
4 TABLET, FILM COATED ORAL EVERY 6 HOURS PRN
Status: DISCONTINUED | OUTPATIENT
Start: 2023-04-13 | End: 2023-04-14 | Stop reason: HOSPADM

## 2023-04-13 RX ORDER — PROPOFOL 10 MG/ML
INJECTION, EMULSION INTRAVENOUS AS NEEDED
Status: DISCONTINUED | OUTPATIENT
Start: 2023-04-13 | End: 2023-04-13 | Stop reason: SURG

## 2023-04-13 RX ORDER — DOCUSATE SODIUM 100 MG/1
100 CAPSULE, LIQUID FILLED ORAL 2 TIMES DAILY PRN
Status: DISCONTINUED | OUTPATIENT
Start: 2023-04-13 | End: 2023-04-14 | Stop reason: HOSPADM

## 2023-04-13 RX ORDER — ONDANSETRON 2 MG/ML
4 INJECTION INTRAMUSCULAR; INTRAVENOUS EVERY 6 HOURS PRN
Status: DISCONTINUED | OUTPATIENT
Start: 2023-04-13 | End: 2023-04-14 | Stop reason: HOSPADM

## 2023-04-13 RX ORDER — INSULIN LISPRO 100 [IU]/ML
2-7 INJECTION, SOLUTION INTRAVENOUS; SUBCUTANEOUS
Status: DISCONTINUED | OUTPATIENT
Start: 2023-04-13 | End: 2023-04-14 | Stop reason: HOSPADM

## 2023-04-13 RX ORDER — HYDRALAZINE HYDROCHLORIDE 20 MG/ML
5 INJECTION INTRAMUSCULAR; INTRAVENOUS
Status: DISCONTINUED | OUTPATIENT
Start: 2023-04-13 | End: 2023-04-13 | Stop reason: HOSPADM

## 2023-04-13 RX ORDER — KETAMINE HCL IN NACL, ISO-OSM 100MG/10ML
SYRINGE (ML) INJECTION AS NEEDED
Status: DISCONTINUED | OUTPATIENT
Start: 2023-04-13 | End: 2023-04-13 | Stop reason: SURG

## 2023-04-13 RX ORDER — LIDOCAINE HYDROCHLORIDE 10 MG/ML
0.5 INJECTION, SOLUTION INFILTRATION; PERINEURAL ONCE AS NEEDED
Status: DISCONTINUED | OUTPATIENT
Start: 2023-04-13 | End: 2023-04-13 | Stop reason: HOSPADM

## 2023-04-13 RX ORDER — ASPIRIN 81 MG/1
81 TABLET ORAL EVERY 12 HOURS SCHEDULED
Status: DISCONTINUED | OUTPATIENT
Start: 2023-04-14 | End: 2023-04-14 | Stop reason: HOSPADM

## 2023-04-13 RX ORDER — FUROSEMIDE 40 MG/1
40 TABLET ORAL EVERY MORNING
Status: DISCONTINUED | OUTPATIENT
Start: 2023-04-14 | End: 2023-04-14 | Stop reason: HOSPADM

## 2023-04-13 RX ORDER — ONDANSETRON 2 MG/ML
INJECTION INTRAMUSCULAR; INTRAVENOUS AS NEEDED
Status: DISCONTINUED | OUTPATIENT
Start: 2023-04-13 | End: 2023-04-13 | Stop reason: SURG

## 2023-04-13 RX ORDER — SODIUM CHLORIDE, SODIUM LACTATE, POTASSIUM CHLORIDE, CALCIUM CHLORIDE 600; 310; 30; 20 MG/100ML; MG/100ML; MG/100ML; MG/100ML
1000 INJECTION, SOLUTION INTRAVENOUS CONTINUOUS
Status: DISCONTINUED | OUTPATIENT
Start: 2023-04-13 | End: 2023-04-14 | Stop reason: HOSPADM

## 2023-04-13 RX ORDER — NALOXONE HCL 0.4 MG/ML
0.4 VIAL (ML) INJECTION AS NEEDED
Status: DISCONTINUED | OUTPATIENT
Start: 2023-04-13 | End: 2023-04-13 | Stop reason: HOSPADM

## 2023-04-13 RX ORDER — MIDAZOLAM HYDROCHLORIDE 1 MG/ML
INJECTION INTRAMUSCULAR; INTRAVENOUS AS NEEDED
Status: DISCONTINUED | OUTPATIENT
Start: 2023-04-13 | End: 2023-04-13 | Stop reason: SURG

## 2023-04-13 RX ORDER — OXYCODONE HCL 10 MG/1
10 TABLET, FILM COATED, EXTENDED RELEASE ORAL ONCE
Status: COMPLETED | OUTPATIENT
Start: 2023-04-13 | End: 2023-04-13

## 2023-04-13 RX ORDER — IBUPROFEN 600 MG/1
1 TABLET ORAL
Status: DISCONTINUED | OUTPATIENT
Start: 2023-04-13 | End: 2023-04-14 | Stop reason: HOSPADM

## 2023-04-13 RX ORDER — SODIUM CHLORIDE 0.9 % (FLUSH) 0.9 %
10 SYRINGE (ML) INJECTION EVERY 12 HOURS SCHEDULED
Status: DISCONTINUED | OUTPATIENT
Start: 2023-04-13 | End: 2023-04-13 | Stop reason: HOSPADM

## 2023-04-13 RX ORDER — MELOXICAM 15 MG/1
15 TABLET ORAL DAILY
Status: DISCONTINUED | OUTPATIENT
Start: 2023-04-13 | End: 2023-04-14

## 2023-04-13 RX ORDER — GABAPENTIN 300 MG/1
600 CAPSULE ORAL ONCE
Status: COMPLETED | OUTPATIENT
Start: 2023-04-13 | End: 2023-04-13

## 2023-04-13 RX ORDER — NALOXONE HCL 0.4 MG/ML
0.1 VIAL (ML) INJECTION
Status: DISCONTINUED | OUTPATIENT
Start: 2023-04-13 | End: 2023-04-14 | Stop reason: HOSPADM

## 2023-04-13 RX ORDER — FLUMAZENIL 0.1 MG/ML
0.2 INJECTION INTRAVENOUS AS NEEDED
Status: DISCONTINUED | OUTPATIENT
Start: 2023-04-13 | End: 2023-04-13 | Stop reason: HOSPADM

## 2023-04-13 RX ORDER — AMLODIPINE BESYLATE 5 MG/1
5 TABLET ORAL DAILY
Status: DISCONTINUED | OUTPATIENT
Start: 2023-04-14 | End: 2023-04-14 | Stop reason: HOSPADM

## 2023-04-13 RX ORDER — ONDANSETRON 2 MG/ML
4 INJECTION INTRAMUSCULAR; INTRAVENOUS ONCE AS NEEDED
Status: DISCONTINUED | OUTPATIENT
Start: 2023-04-13 | End: 2023-04-13 | Stop reason: HOSPADM

## 2023-04-13 RX ORDER — HYDRALAZINE HYDROCHLORIDE 20 MG/ML
INJECTION INTRAMUSCULAR; INTRAVENOUS AS NEEDED
Status: DISCONTINUED | OUTPATIENT
Start: 2023-04-13 | End: 2023-04-13 | Stop reason: SURG

## 2023-04-13 RX ORDER — PHENYLEPHRINE HCL IN 0.9% NACL 1 MG/10 ML
SYRINGE (ML) INTRAVENOUS AS NEEDED
Status: DISCONTINUED | OUTPATIENT
Start: 2023-04-13 | End: 2023-04-13 | Stop reason: SURG

## 2023-04-13 RX ADMIN — HYDROMORPHONE HYDROCHLORIDE 1 MG: 1 INJECTION, SOLUTION INTRAMUSCULAR; INTRAVENOUS; SUBCUTANEOUS at 22:29

## 2023-04-13 RX ADMIN — HYDRALAZINE HYDROCHLORIDE 10 MG: 20 INJECTION INTRAMUSCULAR; INTRAVENOUS at 08:08

## 2023-04-13 RX ADMIN — Medication 25 MG: at 07:57

## 2023-04-13 RX ADMIN — HYDROMORPHONE HYDROCHLORIDE 0.5 MG: 1 INJECTION, SOLUTION INTRAMUSCULAR; INTRAVENOUS; SUBCUTANEOUS at 09:06

## 2023-04-13 RX ADMIN — GABAPENTIN 600 MG: 600 TABLET, FILM COATED ORAL at 14:07

## 2023-04-13 RX ADMIN — SODIUM CHLORIDE, POTASSIUM CHLORIDE, SODIUM LACTATE AND CALCIUM CHLORIDE 1000 ML: 600; 310; 30; 20 INJECTION, SOLUTION INTRAVENOUS at 07:12

## 2023-04-13 RX ADMIN — FENTANYL CITRATE 50 MCG: 50 INJECTION, SOLUTION INTRAMUSCULAR; INTRAVENOUS at 07:32

## 2023-04-13 RX ADMIN — MAGNESIUM SULFATE HEPTAHYDRATE 1 G: 500 INJECTION, SOLUTION INTRAMUSCULAR; INTRAVENOUS at 07:55

## 2023-04-13 RX ADMIN — OXYCODONE 10 MG: 5 TABLET ORAL at 20:31

## 2023-04-13 RX ADMIN — PROPOFOL 150 MG: 10 INJECTION, EMULSION INTRAVENOUS at 07:32

## 2023-04-13 RX ADMIN — MIDAZOLAM 2 MG: 1 INJECTION INTRAMUSCULAR; INTRAVENOUS at 07:26

## 2023-04-13 RX ADMIN — Medication 100 MCG: at 07:37

## 2023-04-13 RX ADMIN — TRANEXAMIC ACID 1000 MG: 10 INJECTION, SOLUTION INTRAVENOUS at 08:22

## 2023-04-13 RX ADMIN — LIDOCAINE HYDROCHLORIDE 50 MG: 10 INJECTION, SOLUTION EPIDURAL; INFILTRATION; INTRACAUDAL; PERINEURAL at 07:32

## 2023-04-13 RX ADMIN — OXYCODONE HYDROCHLORIDE 10 MG: 10 TABLET, FILM COATED, EXTENDED RELEASE ORAL at 07:13

## 2023-04-13 RX ADMIN — CEFAZOLIN 2 G: 2 INJECTION, POWDER, FOR SOLUTION INTRAMUSCULAR; INTRAVENOUS at 17:03

## 2023-04-13 RX ADMIN — GABAPENTIN 600 MG: 600 TABLET, FILM COATED ORAL at 20:34

## 2023-04-13 RX ADMIN — FENTANYL CITRATE 50 MCG: 50 INJECTION, SOLUTION INTRAMUSCULAR; INTRAVENOUS at 07:57

## 2023-04-13 RX ADMIN — LOSARTAN POTASSIUM 50 MG: 50 TABLET, FILM COATED ORAL at 20:32

## 2023-04-13 RX ADMIN — HYDROMORPHONE HYDROCHLORIDE 0.5 MG: 1 INJECTION, SOLUTION INTRAMUSCULAR; INTRAVENOUS; SUBCUTANEOUS at 09:38

## 2023-04-13 RX ADMIN — HYDROMORPHONE HYDROCHLORIDE 0.5 MG: 1 INJECTION, SOLUTION INTRAMUSCULAR; INTRAVENOUS; SUBCUTANEOUS at 10:02

## 2023-04-13 RX ADMIN — METOPROLOL TARTRATE 100 MG: 25 TABLET, FILM COATED ORAL at 20:31

## 2023-04-13 RX ADMIN — HYDROMORPHONE HYDROCHLORIDE 0.5 MG: 1 INJECTION, SOLUTION INTRAMUSCULAR; INTRAVENOUS; SUBCUTANEOUS at 09:19

## 2023-04-13 RX ADMIN — ONDANSETRON 4 MG: 2 INJECTION INTRAMUSCULAR; INTRAVENOUS at 07:58

## 2023-04-13 RX ADMIN — ACETAMINOPHEN 1000 MG: 500 TABLET, FILM COATED ORAL at 07:12

## 2023-04-13 RX ADMIN — PROPOFOL 75 MCG/KG/MIN: 10 INJECTION, EMULSION INTRAVENOUS at 07:35

## 2023-04-13 RX ADMIN — DULOXETINE HYDROCHLORIDE 60 MG: 30 CAPSULE, DELAYED RELEASE ORAL at 14:07

## 2023-04-13 RX ADMIN — Medication 25 MG: at 07:35

## 2023-04-13 RX ADMIN — CEFAZOLIN 2 G: 2 INJECTION, POWDER, FOR SOLUTION INTRAMUSCULAR; INTRAVENOUS at 07:30

## 2023-04-13 RX ADMIN — GABAPENTIN 600 MG: 300 CAPSULE ORAL at 07:12

## 2023-04-13 RX ADMIN — MELOXICAM 15 MG: 15 TABLET ORAL at 14:07

## 2023-04-13 RX ADMIN — TRANEXAMIC ACID 1000 MG: 10 INJECTION, SOLUTION INTRAVENOUS at 07:51

## 2023-04-13 NOTE — THERAPY RE-EVALUATION
Patient Name: Odalis Solo  : 1955    MRN: [id number]                              Today's Date: 2023       Admit Date: 2023    Visit Dx:     ICD-10-CM ICD-9-CM   1. 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   • DDD (degenerative disc disease), cervical   • Family history of diabetes mellitus   • Hyperlipidemia   • Leg length discrepancy   • Status post hip replacement   • History of stroke   • Tear of acetabular labrum   • Type 2 diabetes mellitus (HCC)   • Lumbar radiculopathy   • Cervical radiculopathy   • Cervical spondylosis   • Complete tear of right rotator cuff   • Urine frequency   • Suspected COVID-19 virus infection   • Ataxic gait   • Chest tightness   • Chiari malformation   • MONGE (dyspnea on exertion)   • Hypermetropia   • Lumbago with sciatica   • Migraine with aura   • Myalgia   • Nuclear sclerosis   • Presbyopia   • Arthrodesis status   • Anxiety   • Depression   • GERD (gastroesophageal reflux disease)   • Headache   • Weakness   • Chronic pain of right knee   • Class 1 obesity due to excess calories with serious comorbidity and body mass index (BMI) of 30.0 to 30.9 in adult   • Transient cerebral ischemia   • Cyst of right ovary   • Anemia, unspecified   • Diarrhea   • Diverticulitis   • Dysphagia   • High blood pressure   • Irritable bowel syndrome   • Lower abdominal pain, unspecified   • Nausea   • Rectal bleeding   • Chronic low back pain   • Status post knee replacement     Past Medical History:   Diagnosis Date   • Anemia     hx   • Anxiety    • Chiari syndrome     hx   • Chronic renal failure (CRF), stage 3 (moderate)    • DDD (degenerative disc disease), lumbar    • Depression    • Diabetes mellitus    • Frequent headaches    • History of  narcotic addiction    • Hyperlipidemia    • Hypertension    • Low back pain    • MRSA infection    • Neuropathy     head   • Peripheral edema    • Sciatic pain, right    • Seasonal allergies    • Stroke 2011    mini   • Suspected COVID-19 virus infection 2021   • Urine frequency 2021     Past Surgical History:   Procedure Laterality Date   • ANTERIOR CERVICAL DISCECTOMY W/ FUSION N/A 2019    Procedure: CERVICAL DISCECTOMY ANTERIOR WITH FUSION, C4-6;  Surgeon: Ian Foley MD;  Location: [facility] OR;  Service: Neurosurgery   • APPENDECTOMY     • BRAIN SURGERY      total 8   • CARPAL TUNNEL RELEASE     • CERVICAL CHIARI DECOMPRESSION POSTERIOR      x 5 from frontal parietal x  2   •  SECTION     • CHOLECYSTECTOMY      lap   • HIP SURGERY Left 2017    total   • HYSTERECTOMY     • SHOULDER ARTHROSCOPY W/ ROTATOR CUFF REPAIR Right 09/15/2020    Procedure: SHOULDER ARTHROSCOPY WITH  EXTENSIVE DEBRIDEMENT,  SUBACROMINAL DECOMPRESSION, ROTATOR CUFF REPAIR and bicep tenodesis;  Surgeon: Silviano Gong MD;  Location: [facility] OR;  Service: Orthopedics;  Laterality: Right;   • SHOULDER SURGERY Right 09/15/2020    scope/cuff repair   • TONSILLECTOMY     •  SHUNT INSERTION  10/2022      General Information     Row Name 23 3784          Physical Therapy Time and Intention    Document Type re-evaluation  -BR     Mode of Treatment physical therapy  -BR     Row Name 23 7326          Safety Issues, Functional Mobility    Impairments Affecting Function (Mobility) range of motion (ROM);endurance/activity tolerance;strength;pain;balance  -BR           User Key  (r) = Recorded By, (t) = Taken By, (c) = Cosigned By    Initials Name Provider Type    BR Jennie Figueroa PT Physical Therapist               Mobility     Row Name 23 8770          Bed Mobility    Bed Mobility supine-sit  -BR     Supine-Sit [place] (Bed Mobility) supervision  -BR     Assistive Device  (Bed Mobility) head of bed elevated;bed rails  -BR     Row Name 04/13/23 1336          Sit-Stand Transfer    Sit-Stand [place] (Transfers) contact guard;1 person assist  -BR     Assistive Device (Sit-Stand Transfers) walker, front-wheeled  -BR     Row Name 04/13/23 1336          Gait/Stairs (Locomotion)    [place] Level (Gait) minimum assist (75% patient effort)  -BR     Assistive Device (Gait) walker, front-wheeled  -BR     Ambulated day of surgery or within 4 hours of PACU discharge yes  -BR     Distance in Feet (Gait) 10  -BR     Deviations/Abnormal Patterns (Gait) deejay decreased  -BR     Left Sided Gait Deviations weight shift ability decreased  -BR     Right Sided Gait Deviations heel strike decreased  -BR     Row Name 04/13/23 1336          Mobility    Extremity Weight-bearing Status right lower extremity  -BR     Right Lower Extremity (Weight-bearing Status) weight-bearing as tolerated (WBAT)  -BR           User Key  (r) = Recorded By, (t) = Taken By, (c) = Cosigned By    Initials Name Provider Type    BR Jennie Figueroa, PT Physical Therapist               Obj/Interventions     Row Name 04/13/23 1337          Range of Motion Comprehensive    Comment, General Range of Motion AROM right knee 0 to 90 degrees  -BR     Row Name 04/13/23 1337          Strength Comprehensive (MMT)    Comment, General Manual Muscle Testing (MMT) Assessment Pt required ~35% assist for SLR RLE; pt able to perform LAQ without assistance RLE  -BR     Row Name 04/13/23 1337          Balance    Balance Assessment sitting static balance;sitting dynamic balance;standing static balance  -BR     Static Sitting Balance supervision  -BR     Dynamic Sitting Balance minimal assist  -BR     Position, Sitting Balance unsupported;sitting edge of bed  -BR     Static Standing Balance contact guard  -BR     Position/Device Used, Standing Balance supported;walker, rolling  -BR     Row Name 04/13/23 1337          Sensory Assessment  "(Somatosensory)    Sensory Assessment (Somatosensory) sensation intact  -BR           User Key  (r) = Recorded By, (t) = Taken By, (c) = Cosigned By    Initials Name Provider Type    Jennie Doan PT Physical Therapist               Goals/Plan     Row Name 04/13/23 1353          Bed Mobility Goal 1 (PT)    Progress/Outcomes (Bed Mobility Goal 1, PT) goal ongoing  -BR     Row Name 04/13/23 1353          Transfer Goal 1 (PT)    Progress/Outcome (Transfer Goal 1, PT) goal ongoing  -BR     Row Name 04/13/23 1353          Gait Training Goal 1 (PT)    Progress/Outcome (Gait Training Goal 1, PT) goal ongoing  -BR     Row Name 04/13/23 1353          Problem Specific Goal 1 (PT)    Progress/Outcome (Problem Specific Goal 1, PT) goal ongoing  -BR           User Key  (r) = Recorded By, (t) = Taken By, (c) = Cosigned By    Initials Name Provider Type    Jennie Doan PT Physical Therapist               Clinical Impression     Row Name 04/13/23 1339          Pain    Pretreatment Pain Rating 5/10  -BR     Posttreatment Pain Rating 5/10  -BR     Pain Location - Side/Orientation Right  -BR     Pain Location - knee  -BR     Row Name 04/13/23 1339          Plan of Care Review    Plan of Care Reviewed With patient  -BR     Outcome Evaluation Physical Therapy Re-evaluation: Pt underwent elective right TKA per Dr. Anderson this am, 4/1323. Pt tolerated the procedure well. Pt is lethargic and nods off to sleep occasionally and c/o some \"loopiness\" with movement. Pt performed TKA protocol exercises x 10 reps RLE with assist for SLR. AROM right knee 0 to 90 degrees. Pt performed bed mobility with SBA, transfers required CGA of 1 with rolling walker and pt ambulated 10 feet with rolling walker WBAT RLE from bed to recliner. Gait distance was limited by pt feeling \"loopy\" with movement. Pt has no steps at her patio home. PT recommendation is Home with Assist and Home Health Physical Therapy. Pt will need a youth size rolling " walker for home; she is 5'0 tall.  -BR     Row Name 04/13/23 0101          Therapy Assessment/Plan (PT)    Rehab Potential (PT) good, to achieve stated therapy goals  -BR     Criteria for Skilled Interventions Met (PT) yes;meets criteria;skilled treatment is necessary  -BR     Therapy Frequency (PT) 2 times/day  -BR     Predicted Duration of Therapy Intervention (PT) until D/C  -BR     Row Name 04/13/23 1339          Vital Signs    Pre SpO2 (%) 98  -BR     O2 Delivery Pre Treatment nasal cannula  2L  -BR     Intra SpO2 (%) 92  -BR     O2 Delivery Intra Treatment room air  -BR     Post SpO2 (%) 94  -BR     O2 Delivery Post Treatment room air  -BR     Pre Patient Position Supine  -BR     Intra Patient Position Standing  -BR     Post Patient Position Sitting  -BR     Row Name 04/13/23 6909          Positioning and Restraints    Pre-Treatment Position in bed  -BR     Post Treatment Position chair  -BR     In Chair notified nsg;reclined;call light within reach;encouraged to call for assist  -BR           User Key  (r) = Recorded By, (t) = Taken By, (c) = Cosigned By    Initials Name Provider Type    BR Jennie Figueroa, PT Physical Therapist               Outcome Measures     Row Name 04/13/23 3515 04/13/23 1224       How much help from another person do you currently need...    Turning from your back to your side while in flat bed without using bedrails? 4  -BR 4  -KL    Moving from lying on back to sitting on the side of a flat bed without bedrails? 4  -BR 3  -KL    Moving to and from a bed to a chair (including a wheelchair)? 3  -BR 3  -KL    Standing up from a chair using your arms (e.g., wheelchair, bedside chair)? 3  -BR 3  -KL    Climbing 3-5 steps with a railing? 2  -BR 3  -KL    To walk in hospital room? 3  -BR 3  -KL    AM-PAC 6 Clicks Score (PT) 19  -BR 19  -KL    Highest level of mobility 6 --> Walked 10 steps or more  -BR 6 --> Walked 10 steps or more  -KL    Row Name 04/13/23 1355          Functional  "Assessment    Outcome Measure Options AM-PAC 6 Clicks Basic Mobility (PT)  -BR           User Key  (r) = Recorded By, (t) = Taken By, (c) = Cosigned By    Initials Name Provider Type    Anitha Balderrama, RN Registered Nurse    Jennie Doan, PT Physical Therapist                             Physical Therapy Education     Title: PT OT SLP Therapies (Done)     Topic: Physical Therapy (Done)     Point: Mobility training (Done)     Learning Progress Summary           Patient Acceptance, D,H, VU,DU,NR by BR at 4/13/2023 1355    Acceptance, E,TB, VU by EL at 4/4/2023 1645                   Point: Home exercise program (Done)     Learning Progress Summary           Patient Acceptance, D,H, VU,DU,NR by BR at 4/13/2023 1355                   Point: Body mechanics (Done)     Learning Progress Summary           Patient Acceptance, D,H, VU,DU,NR by BR at 4/13/2023 1355                   Point: Precautions (Done)     Learning Progress Summary           Patient Acceptance, D,H, VU,DU,NR by BR at 4/13/2023 1355    Acceptance, E,TB, VU by EL at 4/4/2023 1645                               User Key     Initials Effective Dates Name Provider Type Discipline     06/23/20 -  Radhames Best, RUBIO Physical Therapist PT     02/01/22 -  Jennie Figueroa PT Physical Therapist PT              PT Recommendation and Plan     Plan of Care Reviewed With: patient  Outcome Evaluation: Physical Therapy Re-evaluation: Pt underwent elective right TKA per Dr. Anderson this am, 4/1323. Pt tolerated the procedure well. Pt is lethargic and nods off to sleep occasionally and c/o some \"loopiness\" with movement. Pt performed TKA protocol exercises x 10 reps RLE with assist for SLR. AROM right knee 0 to 90 degrees. Pt performed bed mobility with SBA, transfers required CGA of 1 with rolling walker and pt ambulated 10 feet with rolling walker WBAT RLE from bed to recliner. Gait distance was limited by pt feeling \"loopy\" with movement. Pt has no steps at " her patio home. PT recommendation is Home with Assist and Home Health Physical Therapy. Pt will need a youth size rolling walker for home; she is 5'0 tall.     Time Calculation:    PT Charges     Row Name 04/13/23 1356             Time Calculation    Start Time 1258  -BR      Stop Time 1333  -BR      Time Calculation (min) 35 min  -BR      PT Received On 04/13/23  -BR      PT - Next Appointment 04/14/23  -BR      PT Goal Re-Cert Due Date 04/27/23  -BR         Time Calculation- PT    Total Timed Code Minutes- PT 0 minute(s)  -BR            User Key  (r) = Recorded By, (t) = Taken By, (c) = Cosigned By    Initials Name Provider Type    BR Jennie Figueroa, PT Physical Therapist              Therapy Charges for Today     Code Description Service Date Service Provider Modifiers Qty    07741370718 HC PT RE-EVAL ESTABLISHED PLAN 2 4/13/2023 Jennie Figueroa, PT GP 1          PT G-Codes  Outcome Measure Options: AM-PAC 6 Clicks Basic Mobility (PT)  AM-PAC 6 Clicks Score (PT): 19  PT Discharge Summary  Anticipated Discharge Disposition (PT): home with home health, home with assist    Jennie Figueroa, PT  4/13/2023

## 2023-04-13 NOTE — CONSULTS
"    [facility] Medicine Services - Consult Note    Patient Name: Odalis Solo  : 1955  MRN: [id number]  Primary Care Physician:  Aubree Alas MD  Referring Physician: Davey Anderson MD  Date of admission: 2023    Inpatient Hospitalist Consult  Consult performed by: Nancy Gutierrez PA-C  Consult ordered by: Davey Anderson II, MD          Subjective      Reason for Consult/ Chief Complaint: Medical management    History of Present Illness: Odalis Solo is a 67 y.o. female with a past medical history to include degenerative joint disease, hypertension, depression, DM 2 with peripheral neuropathy, hyperlipidemia presented to [facility] on 2020 for right total knee arthroplasty.  Patient failed conservative outpatient treatment.  Patient reports overall she is doing very well postoperatively.  She reports pain is well controlled.  She has had some food but feels too sleepy to eat.  She does report that her throat feels \"raw\" after extubation.  She reports that she has been up and about her room with physical therapy.  She is now sitting at bedside.  She denies having any motor or sensory deficit right lower extremity.  She reports ports that she is planning to go home to convalesce.  She denies any nausea or vomiting.  She denies any shortness of air or chest pains.  She denies having any abdominal pain, constipation or diarrhea.  She denies any urinary symptoms.  We have been asked to medically manage this patient while she is in hospital    Review of Systems   All other systems reviewed and are negative.  Except otherwise noted in HPI      Personal History     Past Medical History:   Diagnosis Date   • Anemia     hx   • Anxiety    • Chiari syndrome     hx   • Chronic renal failure (CRF), stage 3 (moderate)    • DDD (degenerative disc disease), lumbar    • Depression    • Diabetes mellitus    • Frequent headaches    • History of narcotic addiction    • " Hyperlipidemia    • Hypertension    • Low back pain    • MRSA infection    • Neuropathy     head   • Peripheral edema    • Sciatic pain, right    • Seasonal allergies    • Stroke     mini   • Suspected COVID-19 virus infection 2021   • Urine frequency 2021       Past Surgical History:   Procedure Laterality Date   • ANTERIOR CERVICAL DISCECTOMY W/ FUSION N/A 2019    Procedure: CERVICAL DISCECTOMY ANTERIOR WITH FUSION, C4-6;  Surgeon: Ian Foley MD;  Location: [facility] OR;  Service: Neurosurgery   • APPENDECTOMY     • BRAIN SURGERY      total 8   • CARPAL TUNNEL RELEASE     • CERVICAL CHIARI DECOMPRESSION POSTERIOR      x 5 from frontal parietal x  2   •  SECTION     • CHOLECYSTECTOMY      lap   • HIP SURGERY Left 2017    total   • HYSTERECTOMY     • SHOULDER ARTHROSCOPY W/ ROTATOR CUFF REPAIR Right 09/15/2020    Procedure: SHOULDER ARTHROSCOPY WITH  EXTENSIVE DEBRIDEMENT,  SUBACROMINAL DECOMPRESSION, ROTATOR CUFF REPAIR and bicep tenodesis;  Surgeon: Silviano Gong MD;  Location: [facility];  Service: Orthopedics;  Laterality: Right;   • SHOULDER SURGERY Right 09/15/2020    scope/cuff repair   • TONSILLECTOMY     •  SHUNT INSERTION  10/2022       Family History: family history includes Asthma in her sister; Cancer in her father and mother; Diabetes in her sister; Heart disease in her mother; Hyperlipidemia in her mother; Hypertension in her brother and mother; Leukemia in her brother. Otherwise pertinent FHx was reviewed and not pertinent to current issue.    Social History:  reports that she quit smoking about 25 years ago. Her smoking use included cigarettes. She has a 2.50 pack-year smoking history. She has never used smokeless tobacco. She reports current alcohol use. She reports that she does not currently use drugs after having used the following drugs: Hydrocodone.    Home Medications:   Alirocumab, Aspirin, DULoxetine, Dulaglutide, acetaminophen,  amLODIPine, diphenhydrAMINE, fenofibrate, furosemide, gabapentin, glipizide, icosapent ethyl, losartan, and metoprolol tartrate    Allergies:  Allergies   Allergen Reactions   • Sulfa Antibiotics Hives         Objective      Vitals:  Temp:  [97.4 °F (36.3 °C)-99.1 °F (37.3 °C)] 98.1 °F (36.7 °C)  Heart Rate:  [72-88] 73  Resp:  [11-16] 14  BP: ()/(55-87) 107/67  Flow (L/min):  [2] 2    Physical Exam  Constitutional:       General: She is not in acute distress.     Appearance: She is obese.   HENT:      Head: Normocephalic and atraumatic.   Cardiovascular:      Rate and Rhythm: Normal rate and regular rhythm.      Pulses: Normal pulses.      Heart sounds: No murmur heard.  Pulmonary:      Effort: Pulmonary effort is normal. No respiratory distress.      Breath sounds: Normal breath sounds.   Abdominal:      General: Bowel sounds are normal.      Palpations: Abdomen is soft.      Tenderness: There is no abdominal tenderness.   Musculoskeletal:      Cervical back: Normal range of motion and neck supple.      Comments: Right lower extremity remains wrapped with ice orthopedic bandage.  No motor or sensory deficit right foot   Skin:     General: Skin is warm and dry.   Neurological:      General: No focal deficit present.      Mental Status: She is alert and oriented to person, place, and time.   Psychiatric:         Mood and Affect: Mood normal.            Result Review    Result Review:  I have personally reviewed the results from the time of this admission to 4/13/2023 14:27 EDT and agree with these findings:  [x]  Laboratory  [x]  Microbiology  [x]  Radiology  [x]  EKG/Telemetry   []  Cardiology/Vascular   []  Pathology  []  Old records  []  Other:        Assessment & Plan        Active Hospital Problems:  Active Hospital Problems    Diagnosis    • **Status post knee replacement      Plan:     S/p right total knee arthroplasty  -Postoperative surgical management, incisional care, pain management per  surgery  -PT OT  -Discharge planning per surgery  -Will add Chloraseptic spray for sore throat after extubation    Hypertension  -Blood pressure 107/67  -Continue amlodipine, losartan, metoprolol  -Continue to monitor for pressures <100/60 and low heart rate below 60    DM2 with peripheral neuropathy  -SSI  -Accu-Cheks before meals and at bedtime  -Continue gabapentin    Depression  -Continue Cymbalta    Signature: Electronically signed by Nancy Gutierrez PA-C, 04/13/23, 3:19 PM EDT.

## 2023-04-13 NOTE — OP NOTE
ROBOTIC Total Knee Replacement Operative Note  Dr. MARCIA Anderson II  [phone]    PATIENT NAME: Odalis Solo  MRN: [id number]  : 1955 AGE: 67 y.o. GENDER: female  DATE OF OPERATION: 2023  PREOPERATIVE DIAGNOSIS: End Stage Arthritis  POSTOPERATIVE DIAGNOSIS: Same  OPERATION PERFORMED: Right Robotic Assisted Total Knee Arthroplasty  SURGEON: Davey Anderson MD  Circulator: Mindy Deshpande RN  Scrub Person: Sacha Beard  Vendor Representative: Car Davenport; Verenice Coulter  Assistant: Scott Baird CSFA  ANESTHESIA: General  ASSISTANT: Scott Baird. This case would not have been possible without another set of skilled surgical hands for retraction, bone reduction, use of instrumentation, assistance with implants, and closure.  This assistance was vital to the success of the case.   ESTIMATED BLOOD LOSS: 100cc  SPONGE AND NEEDLE COUNT: Correct  INDICATIONS:   A discussion of operative versus nonoperative treatment was had with the patient and they failed conservative management. They elected to undergo total knee arthroplasty. The risks of surgery were discussed and included the risk of anesthesia, infection, damage to neurovascular structures, implant loosening/failure, fracture, hardware prominence, continued pain, early failure, the need for further procedures, medical complications, and others. No guarantees were made. The patient wished to proceed with surgery and a surgical consent was signed.    COMPONENTS:   · Journey II BCS Oxinium Femoral Component: Size 2  · Journey II Tibial Baseplate: 1   · Posterior Stabilized Insert: 11  · Patella: 29mm      PERTINENT FINDINGS: Degenerative Arthritis    DETAILS OF PROCEDURE:  The patient was met in the preoperative area. The site was marked. The consent and H&P were reviewed. The patient was then wheeled back to the operative suite and transferred to the operative table. The patient underwent anesthesia. A tourniquet was placed on the upper  thigh. Surgical alcohol was used to thoroughly clean the entire operative extremity.     The leg was then prepped in the normal sterile fashion and surgical space suits were used for the entire operative team. New outer gloves were used by all sterile surgical team members after final draping. After a surgical timeout, the tourniquet was inflated.     I began by inserting 2 pins into the tibial and femoral shafts and attaching the tibial robotic .    In flexion, a midline knee incision was utilized centered on the patella and ending medial to the tibial tubercle. Dissection was carried down to the knee capsule. A medial parapatellar ararthrotomy was completed. The patellar fat pad was excised. The MCL was minimally elevated to gain adequate exposure to the knee.  The suprapatellar fat pad was also excised.  The patella was subluxed laterally. The patella was held vertical using 2 clamps, and was then cut using a saw. The patella was then sized, and the lug holes were drilled. Excess patellar bone was removed using a saw. The patella was then protected during this case using the metal patella shield.    The tibial and femoral guides were then attached to the pins.  I did utilize a femoral button but not a tibial button.    The ACL, meniscus, and PCL were then resected.  Next I proceeded with a standard mapping of the knee including all relevant anatomic positions, range of motion, and stressing of ligaments.  I then planned out the knee including implant sizes, rotation, and bony resection to appropriately balance the knee as needed.      After the mapping and planning was complete, I turned my attention to the distal femur.  Using the bur, I was able to remove the distal femoral bone and create the initial lug holes.  I then used the punch to create the pinholes for the 5-in-1 cutting block.  The 5-in-1 cutting block was then snapped into place and pinned.  I used a saw to resect the anterior posterior and  chamfer cuts.  These were all removed using a rongeur.    I then turned my attention to the tibia.  Retractors were placed appropriately.  Using the robot for guidance, a cutting jig was attached to the tibia using 2 pins.  This was done just above the level of measured resection.  I then used a saw to cut the tibia and remove this bone.  At that point, I was able to use the bur to resect down to the actual intended target tibial resection line.  This was verified to be accurate afterwards on the robot screen.    At that point, the remaining meniscus and osteophytes were removed.  I then attached the femoral component which was well-seated. The femoral BCS box was then prepared for.  I then trialed the knee and was noted to be in excellent balance with good range of motion.    We next turned our attention back to the tibia to finish the tibial preparation. The tibia was measured and sized. The tibial plate was aligned with the rotation from the trialing process and verified to be positioned near the medial third of the tibial tubercle. The tibial surface was then prepared for the keel.     The knee was thoroughly irrigated with sterile saline using a pulse-lavage system while the final tibial baseplate, femoral component and patellar component were opened. Cement was prepared and mixed using standard techniques. Outer gloves were changed before implant handling to ensure no soft tissue or oily material was exposed to the surfaces of the final implants. The bony knee surfaces were dried and the implants were cemented in place, starting with the tibia, then the femur and finally the patella. Excess cement was removed at each step. A trial poly was utilized during cementation for compression. The tourniquet was taken down and adequate hemostasis was achieved. The knee was thoroughly irrigated once again.     The soft tissues about the knee were then injected with an anesthetic cocktail. Care was taken to avoid the  "peroneal nerve and the neurovascular bundle posteriorly. The cement was allowed to harden.  While the cement was hardening, I did remove all 4 pins and the and femoral button.  The tibial and femoral pin sites were closed.    After the cement was fully set, the knee was ranged with various thickness of polyethylene trials to ensure that the balance was appropriate after robotic resection. The knee was inspected for excess cement, which was removed. The real poly, of corresponding thickness was then opened and inserted into the knee. One final range of motion and stability test showed the knee to be in good condition with a well tracking patellar component.    The knee capsule was then closed with a running barbed suture as well as interrupted Ethibond sutures. The knee was then closed in layers.  A sterile dressing was applied.    The patient was awoken from anesthesia, moved to the [facility] and taken to the recovery room in stable condition. Sponge and needle count were correct. There were no complications. Patient tolerated the procedure well.    R \"John\" Justin MUNROE MD  Orthopaedic Surgery  [facility]  [phone]                  "

## 2023-04-13 NOTE — PLAN OF CARE
"Goal Outcome Evaluation:  Plan of Care Reviewed With: patient  Physical Therapy Re-evaluation: Pt underwent elective right TKA per Dr. Anderson this am, 4/1323. Pt tolerated the procedure well. Pt is lethargic and nods off to sleep occasionally and c/o some \"loopiness\" with movement. Pt performed TKA protocol exercises x 10 reps RLE with assist for SLR. AROM right knee 0 to 90 degrees. Pt performed bed mobility with SBA, transfers required CGA of 1 with rolling walker and pt ambulated 10 feet with rolling walker WBAT RLE from bed to recliner. Gait distance was limited by pt feeling \"loopy\" with movement. Pt has no steps at her patio home. PT recommendation is Home with Assist and Home Health Physical Therapy. Pt will need a youth size rolling walker for home; she is 5'0 tall.              "

## 2023-04-13 NOTE — H&P
Orthopaedic Surgery  History & Physical For Elective Total Knee  Dr. MARCIA Anderson II  [phone]    HPI:  Patient is a 67 y.o. Not  or  female who presents with End-stage arthritis of the right knee. They failed conservative treatment of their knee pain and a thorough discussion of the risks and benefits of surgery was had. The patient wishes to continue with elective total knee replacement, they were scheduled and are here for surgery. They did get medical clearance as well as a thorough preoperative workup.    MEDICAL HISTORY  Past Medical History:   Diagnosis Date   • Anemia     hx   • Anxiety    • Chiari syndrome     hx   • Chronic renal failure (CRF), stage 3 (moderate)    • DDD (degenerative disc disease), lumbar    • Depression    • Diabetes mellitus    • Frequent headaches    • History of narcotic addiction    • Hyperlipidemia    • Hypertension    • Low back pain    • MRSA infection    • Neuropathy     head   • Peripheral edema    • Sciatic pain, right    • Seasonal allergies    • Stroke     mini   • Suspected COVID-19 virus infection 2021   • Urine frequency 2021   ·   Past Surgical History:   Procedure Laterality Date   • ANTERIOR CERVICAL DISCECTOMY W/ FUSION N/A 2019    Procedure: CERVICAL DISCECTOMY ANTERIOR WITH FUSION, C4-6;  Surgeon: Ian Foley MD;  Location: [facility];  Service: Neurosurgery   • APPENDECTOMY     • BRAIN SURGERY      total 8   • CARPAL TUNNEL RELEASE     • CERVICAL CHIARI DECOMPRESSION POSTERIOR      x 5 from frontal parietal x  2   •  SECTION     • CHOLECYSTECTOMY      lap   • HIP SURGERY Left 2017    total   • HYSTERECTOMY     • SHOULDER ARTHROSCOPY W/ ROTATOR CUFF REPAIR Right 09/15/2020    Procedure: SHOULDER ARTHROSCOPY WITH  EXTENSIVE DEBRIDEMENT,  SUBACROMINAL DECOMPRESSION, ROTATOR CUFF REPAIR and bicep tenodesis;  Surgeon: Silviano Gong MD;  Location: [facility] OR;  Service: Orthopedics;   Laterality: Right;   • SHOULDER SURGERY Right 09/15/2020    scope/cuff repair   • TONSILLECTOMY     •  SHUNT INSERTION  10/2022   ·   Prior to Admission medications    Medication Sig Start Date End Date Taking? Authorizing Provider   acetaminophen (TYLENOL) 500 MG tablet Take 2 tablets by mouth Every 6 (Six) Hours. None preop 4/14/22  Yes Gil Lemos MD   amLODIPine (NORVASC) 5 MG tablet Take 1 tablet by mouth Daily.   Yes Gil Lemos MD   diphenhydrAMINE (BENADRYL) 25 mg capsule Take 1 capsule by mouth As Needed for Itching. None preop   Yes Gil Lemos MD   DULoxetine (CYMBALTA) 60 MG capsule Take 1 capsule by mouth Daily. Take preop 3/22/22  Yes Gil Lemos MD   fenofibrate 160 MG tablet Take 1 tablet by mouth Daily With Dinner.   Yes Gil Lemos MD   furosemide (LASIX) 40 MG tablet Take 1 tablet by mouth Every Morning. Hold DOS 9/30/15  Yes Gil Lemos MD   gabapentin (NEURONTIN) 600 MG tablet TAKE 1 TABLET BY MOUTH TWICE DAILY  Patient taking differently: Take 1 tablet by mouth 2 (Two) Times a Day. Take preop 3/30/23  Yes Aubree Alas MD   glipizide (GLUCOTROL) 5 MG tablet TAKE 1 TABLET BY MOUTH TWICE DAILY  Patient taking differently: Take 1 tablet by mouth 2 (Two) Times a Day Before Meals. None preop 3/24/23  Yes Aubree Alas MD   losartan (Cozaar) 50 MG tablet Take 1 tablet by mouth 2 (Two) Times a Day.  Patient taking differently: Take 1 tablet by mouth 2 (Two) Times a Day. Last dose 4/12 by 0730 9/22/22  Yes Aubree Alas MD   metoprolol tartrate (LOPRESSOR) 100 MG tablet TAKE 1 TABLET BY MOUTH TWICE DAILY.  Patient taking differently: Take 1 tablet by mouth 2 (Two) Times a Day. Take preop 3/24/23  Yes Aubree Alas MD   ASPIRIN 81 PO Take 81 mg by mouth Daily. Ld 4/6 4/28/22   Gil Lemos MD   Dulaglutide 1.5 MG/0.5ML solution pen-injector Inject 1.5 mg under the skin into the appropriate area as  directed 1 (One) Time Per Week.  Patient taking differently: Inject 1.5 mg under the skin into the appropriate area as directed 1 (One) Time Per Week. 23   Aubree Alas MD   Omega-3 Fatty Acids (fish oil) 1000 MG capsule capsule Take 1 capsule by mouth Daily.    Emergency, Nurse Epic, RN   Praluent 75 MG/ML solution auto-injector INJECT 1 ML INTO THE SKIN INTO THE APPROPRIATE AREA AS DIRECTED EVERY 14 DAYS  Patient taking differently: Inject 1 mL under the skin into the appropriate area as directed Every 14 (Fourteen) Days. 23   Aubree Alas MD   Vascepa 1 g capsule capsule TAKE 2 CAPSULES BY MOUTH TWICE DAILY WITH MEALS  Patient taking differently: Take 1 g by mouth 2 (Two) Times a Day With Meals. Last dose 4/6 3/21/23   Aubree Alas MD   ·   Allergies   Allergen Reactions   • Sulfa Antibiotics Hives   ·   Most Recent Immunizations   Administered Date(s) Administered   • COVID-19 (MODERNA) 1st, 2nd, 3rd Dose Only 2021   • COVID-19 (MODERNA) BOOSTER 2022   • FluLaval/Fluzone >6mos 10/05/2020   • Fluad Quad 65+ 10/04/2021   • Fluzone High-Dose 65+yrs 10/03/2022   • Hepatitis A 10/31/2018   • Influenza TIV (IM) 10/01/2019   • Pneumococcal Conjugate 13-Valent (PCV13) 2022   • Pneumococcal Polysaccharide (PPSV23) 2017   • Shingrix 2021   • Td 2018   • Tdap 2018   ·   Social History     Tobacco Use   • Smoking status: Former     Packs/day: 0.25     Years: 10.00     Pack years: 2.50     Types: Cigarettes     Quit date: 1997     Years since quittin.6   • Smokeless tobacco: Never   Substance Use Topics   • Alcohol use: Yes     Comment: 2 times a month   ·    Social History     Substance and Sexual Activity   Drug Use Not Currently   • Types: Hydrocodone    Comment: hx addiction   ·     REVIEW OF SYSTEMS:  · Head: negative for headache  · Respiratory: negative for shortness of breath.   · Cardiovascular: negative for chest pain.    · Gastrointestinal: negative abdominal pain.   · Neurological: negative for LOC  · Psychiatric/Behavioral: negative for memory loss.   · All other systems reviewed and are negative    VITALS: /87   Pulse 72   Temp 98 °F (36.7 °C)   Resp 16   Wt 69.4 kg (153 lb)   SpO2 100%   BMI 30.39 kg/m²  Body mass index is 30.39 kg/m².    PHYSICAL EXAM:   · CONSTITUTIONAL: A&Ox3, No acute distress  · LUNGS: Equal chest rise, no shortness of air  · CARDIOVASCULAR: palpable peripheral pulses  · SKIN: no skin lesions in the area examined  · LYMPH: no lymphadenopathy in the area examined  · EXTREMITY: Knee  · Pulses:  Brisk Capillary Refill  · Sensation: Intact to Saphenous, Sural, Deep Peroneal, Superficial Peroneal, and Tibial Nerves and grossly throughout extremity  · Motor: 5/5 EHL/FHL/TA/GS motor complexes    RADIOLOGY REVIEW:   No radiology results for the last 7 days    LABS:   Results for the past 24 hours:   Recent Results (from the past 24 hour(s))   POC Glucose Once    Collection Time: 04/13/23  6:28 AM    Specimen: Blood   Result Value Ref Range    Glucose 118 (H) 70 - 105 mg/dL       IMPRESSION:  Patient is a 67 y.o. Not  or  female with end-stage arthritis of the right knee    PLAN:   · Surgery: Elective total knee arthroplasty  · Consent: The risks and benefits of operative versus nonoperative treatment were discussed. The patient elected to undergo operative treatment of their knee arthritis. The risks discussed included but were not limited to blood clots, MI, stroke, other medical complications, infection, damage to neurovascular structures, continued pain, hardware prominence, loss of range of motion, need for further procedures, and and risk of anesthesia..  No guarantees were made   · Disposition: Elective right Total Knee Arthroplasty today.    Davey Anderson II, MD  Orthopaedic Surgery  [facility]

## 2023-04-13 NOTE — DISCHARGE PLACEMENT REQUEST
"Alejandra Nesbitt (67 y.o. Female)     Date of Birth   1955    Social Security Number       Address   [address] [place] [address] [place] [facility]    Home Phone   [phone]    MRN   [id number]       [employer]   M[employer]on    Marital Status                               Admission Date   4/13/23    Admission Type   Elective    Admitting Provider   Davey Anderson II, MD    Attending Provider   Davey Anderson II, MD    Department, Room/Bed   [facility] SURGICAL INPATIENT, 4113/1       Discharge Date       Discharge Disposition       Discharge Destination                               Attending Provider: Davey Anderson II, MD    Allergies: Sulfa Antibiotics    Isolation: None   Infection: None   Code Status: CPR    Ht: 151.1 cm (59.5\")   Wt: 69.4 kg (153 lb)    Admission Cmt: None   Principal Problem: Status post knee replacement [Z96.659]                 Active Insurance as of 4/13/2023     Primary Coverage     Payor Plan Insurance Group Employer/Plan Group    ANTHEM MEDICARE REPLACEMENT ANTHEM MEDICARE ADVANTAGE [id number]     Payor Plan Address Payor Plan Phone Number Payor Plan Fax Number Effective Dates    [address] [phone]  3/1/2023 - None Entered    [facility] [address]       Subscriber Name Subscriber Birth Date Member ID       ALEJANDRA NESBITT 1955 [id number]           Secondary Coverage     Payor Plan Insurance Group Employer/Plan Group    [place]      Payor Plan Address Payor Plan Phone Number Payor Plan Fax Number Effective Dates    [address]   1/1/2019 - None Entered    [place] [address]       Subscriber Name Subscriber Birth Date Member ID       ALEJANDRA NESBITT 1955 [id number]                 Emergency Contacts      (Rel.) Home Phone Work Phone Mobile Phone    MARTIN MCKAY (Sister) -- -- [phone]    BRUCESHANICE MARADIAGA (Sister) -- -- [phone]              "

## 2023-04-13 NOTE — ANESTHESIA POSTPROCEDURE EVALUATION
Patient: Odalis Solo    Procedure Summary     Date: 04/13/23 Room / Location: [facility] OR  / [facility] MAIN OR    Anesthesia Start: 0726 Anesthesia Stop: 0856    Procedure: TOTAL KNEE ARTHROPLASTY WITH CORI ROBOT (Right: Knee) Diagnosis:       Osteoarthritis of right knee      (Osteoarthritis of right knee [M17.11])    Surgeons: Davey Anderson II, MD Provider: Rodrigo Church MD    Anesthesia Type: general, ERAS Protocol ASA Status: 3          Anesthesia Type: general, ERAS Protocol    Vitals  Vitals Value Taken Time   /79 04/13/23 0929   Temp 97.4 °F (36.3 °C) 04/13/23 0852   Pulse 81 04/13/23 0932   Resp 13 04/13/23 0922   SpO2 98 % 04/13/23 0932   Vitals shown include unvalidated device data.        Post Anesthesia Care and Evaluation    Patient location during evaluation: PACU  Patient participation: complete - patient participated  Level of consciousness: awake  Pain scale: See nurse's notes for pain score.  Pain management: adequate    Airway patency: patent  Anesthetic complications: No anesthetic complications  PONV Status: none  Cardiovascular status: acceptable  Respiratory status: acceptable and spontaneous ventilation  Hydration status: acceptable    Comments: Patient seen and examined postoperatively; vital signs stable; SpO2 greater than or equal to 90%; cardiopulmonary status stable; nausea/vomiting adequately controlled; pain adequately controlled; no apparent anesthesia complications; patient discharged from anesthesia care when discharge criteria were met

## 2023-04-13 NOTE — ANESTHESIA PROCEDURE NOTES
Airway  Urgency: elective    Date/Time: 4/13/2023 7:30 AM  End Time:4/13/2023 7:33 AM    General Information and Staff    Patient location during procedure: OR  Anesthesiologist: Rodrigo Church MD  CRNA/CAA: Elena Maier CRNA    Indications and Patient Condition  Indications for airway management: airway protection    Preoxygenated: yes  MILS maintained throughout  Mask difficulty assessment: 0 - not attempted    Final Airway Details  Final airway type: supraglottic airway      Successful airway: LMA  Size 4     Number of attempts at approach: 1  Assessment: lips, teeth, and gum same as pre-op and atraumatic intubation

## 2023-04-13 NOTE — PLAN OF CARE
Goal Outcome Evaluation:              Outcome Evaluation: Pt oob in recliner at this time. No c/o pain/discomfort, no s/sx of distress noted. LU and dressing present to RLE, c/d/i. VSS call light in reach, plan of care on going.

## 2023-04-14 ENCOUNTER — READMISSION MANAGEMENT (OUTPATIENT)
Dept: CALL CENTER | Facility: HOSPITAL | Age: 68
End: 2023-04-14
Payer: MEDICARE

## 2023-04-14 ENCOUNTER — HOME HEALTH ADMISSION (OUTPATIENT)
Dept: HOME HEALTH SERVICES | Facility: HOME HEALTHCARE | Age: 68
End: 2023-04-14
Payer: COMMERCIAL

## 2023-04-14 VITALS
OXYGEN SATURATION: 94 % | DIASTOLIC BLOOD PRESSURE: 71 MMHG | TEMPERATURE: 99.7 F | HEART RATE: 92 BPM | SYSTOLIC BLOOD PRESSURE: 134 MMHG | WEIGHT: 153 LBS | HEIGHT: 60 IN | BODY MASS INDEX: 30.04 KG/M2 | RESPIRATION RATE: 16 BRPM

## 2023-04-14 LAB
ANION GAP SERPL CALCULATED.3IONS-SCNC: 10 MMOL/L (ref 5–15)
BUN SERPL-MCNC: 21 MG/DL (ref 8–23)
BUN/CREAT SERPL: 17.2 (ref 7–25)
CALCIUM SPEC-SCNC: 8.7 MG/DL (ref 8.6–10.5)
CHLORIDE SERPL-SCNC: 100 MMOL/L (ref 98–107)
CO2 SERPL-SCNC: 24 MMOL/L (ref 22–29)
CREAT SERPL-MCNC: 1.22 MG/DL (ref 0.57–1)
DEPRECATED RDW RBC AUTO: 45.9 FL (ref 37–54)
EGFRCR SERPLBLD CKD-EPI 2021: 49 ML/MIN/1.73
ERYTHROCYTE [DISTWIDTH] IN BLOOD BY AUTOMATED COUNT: 13.6 % (ref 12.3–15.4)
GLUCOSE BLDC GLUCOMTR-MCNC: 134 MG/DL (ref 70–105)
GLUCOSE BLDC GLUCOMTR-MCNC: 186 MG/DL (ref 70–105)
GLUCOSE SERPL-MCNC: 102 MG/DL (ref 65–99)
HCT VFR BLD AUTO: 31.4 % (ref 34–46.6)
HGB BLD-MCNC: 10.6 G/DL (ref 12–15.9)
MCH RBC QN AUTO: 30.8 PG (ref 26.6–33)
MCHC RBC AUTO-ENTMCNC: 33.6 G/DL (ref 31.5–35.7)
MCV RBC AUTO: 91.8 FL (ref 79–97)
PLATELET # BLD AUTO: 209 10*3/MM3 (ref 140–450)
PMV BLD AUTO: 8.7 FL (ref 6–12)
POTASSIUM SERPL-SCNC: 4.3 MMOL/L (ref 3.5–5.2)
RBC # BLD AUTO: 3.42 10*6/MM3 (ref 3.77–5.28)
SODIUM SERPL-SCNC: 134 MMOL/L (ref 136–145)
WBC NRBC COR # BLD: 6.9 10*3/MM3 (ref 3.4–10.8)

## 2023-04-14 PROCEDURE — A9270 NON-COVERED ITEM OR SERVICE: HCPCS | Performed by: ORTHOPAEDIC SURGERY

## 2023-04-14 PROCEDURE — 63710000001 INSULIN LISPRO (HUMAN) PER 5 UNITS: Performed by: PHYSICIAN ASSISTANT

## 2023-04-14 PROCEDURE — 82962 GLUCOSE BLOOD TEST: CPT

## 2023-04-14 PROCEDURE — 63710000001 AMLODIPINE 5 MG TABLET: Performed by: ORTHOPAEDIC SURGERY

## 2023-04-14 PROCEDURE — 97116 GAIT TRAINING THERAPY: CPT

## 2023-04-14 PROCEDURE — 85027 COMPLETE CBC AUTOMATED: CPT | Performed by: ORTHOPAEDIC SURGERY

## 2023-04-14 PROCEDURE — 63710000001 ASPIRIN 81 MG TABLET DELAYED-RELEASE: Performed by: ORTHOPAEDIC SURGERY

## 2023-04-14 PROCEDURE — 63710000001 METOPROLOL TARTRATE 25 MG TABLET: Performed by: ORTHOPAEDIC SURGERY

## 2023-04-14 PROCEDURE — 97530 THERAPEUTIC ACTIVITIES: CPT

## 2023-04-14 PROCEDURE — 97110 THERAPEUTIC EXERCISES: CPT

## 2023-04-14 PROCEDURE — 63710000001 GABAPENTIN 600 MG TABLET: Performed by: ORTHOPAEDIC SURGERY

## 2023-04-14 PROCEDURE — G0378 HOSPITAL OBSERVATION PER HR: HCPCS

## 2023-04-14 PROCEDURE — A9270 NON-COVERED ITEM OR SERVICE: HCPCS | Performed by: PHYSICIAN ASSISTANT

## 2023-04-14 PROCEDURE — 63710000001 FUROSEMIDE 40 MG TABLET: Performed by: ORTHOPAEDIC SURGERY

## 2023-04-14 PROCEDURE — 80048 BASIC METABOLIC PNL TOTAL CA: CPT | Performed by: ORTHOPAEDIC SURGERY

## 2023-04-14 PROCEDURE — 63710000001 DULOXETINE 30 MG CAPSULE DELAYED-RELEASE PARTICLES: Performed by: ORTHOPAEDIC SURGERY

## 2023-04-14 PROCEDURE — 25010000002 CEFAZOLIN PER 500 MG: Performed by: ORTHOPAEDIC SURGERY

## 2023-04-14 PROCEDURE — 63710000001 LOSARTAN 50 MG TABLET: Performed by: ORTHOPAEDIC SURGERY

## 2023-04-14 PROCEDURE — 63710000001 MELOXICAM 15 MG TABLET: Performed by: ORTHOPAEDIC SURGERY

## 2023-04-14 PROCEDURE — 63710000001 OXYCODONE 5 MG TABLET: Performed by: ORTHOPAEDIC SURGERY

## 2023-04-14 RX ORDER — ASPIRIN 81 MG/1
81 TABLET ORAL EVERY 12 HOURS
Qty: 60 TABLET | Refills: 0 | Status: SHIPPED | OUTPATIENT
Start: 2023-04-14 | End: 2023-04-16

## 2023-04-14 RX ORDER — OXYCODONE HYDROCHLORIDE AND ACETAMINOPHEN 5; 325 MG/1; MG/1
1 TABLET ORAL EVERY 4 HOURS PRN
Qty: 50 TABLET | Refills: 0 | Status: SHIPPED | OUTPATIENT
Start: 2023-04-14

## 2023-04-14 RX ORDER — ENOXAPARIN SODIUM 100 MG/ML
30 INJECTION SUBCUTANEOUS EVERY 24 HOURS
Status: DISCONTINUED | OUTPATIENT
Start: 2023-04-14 | End: 2023-04-14

## 2023-04-14 RX ORDER — ONDANSETRON 4 MG/1
4 TABLET, FILM COATED ORAL EVERY 6 HOURS PRN
Qty: 30 TABLET | Refills: 0 | Status: SHIPPED | OUTPATIENT
Start: 2023-04-14 | End: 2023-04-16

## 2023-04-14 RX ORDER — OXYCODONE HYDROCHLORIDE AND ACETAMINOPHEN 5; 325 MG/1; MG/1
1 TABLET ORAL EVERY 4 HOURS PRN
Qty: 50 TABLET | Refills: 0 | Status: SHIPPED | OUTPATIENT
Start: 2023-04-14 | End: 2023-04-14

## 2023-04-14 RX ORDER — ASPIRIN 81 MG/1
81 TABLET ORAL EVERY 12 HOURS
Qty: 60 TABLET | Refills: 0 | Status: SHIPPED | OUTPATIENT
Start: 2023-04-14 | End: 2023-05-14

## 2023-04-14 RX ORDER — ONDANSETRON 4 MG/1
4 TABLET, FILM COATED ORAL EVERY 6 HOURS PRN
Qty: 30 TABLET | Refills: 0 | Status: SHIPPED | OUTPATIENT
Start: 2023-04-14

## 2023-04-14 RX ADMIN — OXYCODONE 10 MG: 5 TABLET ORAL at 07:28

## 2023-04-14 RX ADMIN — AMLODIPINE BESYLATE 5 MG: 5 TABLET ORAL at 09:29

## 2023-04-14 RX ADMIN — ASPIRIN 81 MG: 81 TABLET, COATED ORAL at 09:29

## 2023-04-14 RX ADMIN — GABAPENTIN 600 MG: 600 TABLET, FILM COATED ORAL at 09:29

## 2023-04-14 RX ADMIN — OXYCODONE 10 MG: 5 TABLET ORAL at 00:23

## 2023-04-14 RX ADMIN — CEFAZOLIN 2 G: 2 INJECTION, POWDER, FOR SOLUTION INTRAMUSCULAR; INTRAVENOUS at 00:24

## 2023-04-14 RX ADMIN — INSULIN LISPRO 2 UNITS: 100 INJECTION, SOLUTION INTRAVENOUS; SUBCUTANEOUS at 12:21

## 2023-04-14 RX ADMIN — MELOXICAM 15 MG: 15 TABLET ORAL at 09:30

## 2023-04-14 RX ADMIN — LOSARTAN POTASSIUM 50 MG: 50 TABLET, FILM COATED ORAL at 09:29

## 2023-04-14 RX ADMIN — METOPROLOL TARTRATE 100 MG: 25 TABLET, FILM COATED ORAL at 09:29

## 2023-04-14 RX ADMIN — DULOXETINE HYDROCHLORIDE 60 MG: 30 CAPSULE, DELAYED RELEASE ORAL at 09:29

## 2023-04-14 RX ADMIN — OXYCODONE 10 MG: 5 TABLET ORAL at 15:33

## 2023-04-14 RX ADMIN — FUROSEMIDE 40 MG: 40 TABLET ORAL at 06:45

## 2023-04-14 RX ADMIN — OXYCODONE 10 MG: 5 TABLET ORAL at 11:08

## 2023-04-14 NOTE — PLAN OF CARE
"Assessment: Odalis Solo presents with functional mobility impairments which indicate the need for skilled intervention.  Pt had 2 episodes where she let go of the walker while up on her feet due to a jab of pain. PT instructed pt to not release her hands on the walker due to risk for falls. Plan was for pt' sister to stay with her post-op and sister can no longer do that. Pt will call her son to see if he can take her home and stay with her as needed. P Tolerating session today without incident. Will continue to follow and progress as tolerated.     Plan/Recommendations:   Low Intensity Therapy recommended post-acute care - This is recommended as therapy feels this patient would require 2-3 visits per week. OP or HH would be the best option depending on patient's home bound status. Consider, if the patient has other  \"skilled\" needs such as wounds, IV antibiotics, etc. Combined with \"low intensity\" could also equate to a SNF. If patient is medically complex, consider LTAC.. Pt requires rolling walker at discharge.     Pt desires Home with family assist and and Home Health at discharge. Pt cooperative; agreeable to therapeutic recommendations and plan of care.     "

## 2023-04-14 NOTE — THERAPY TREATMENT NOTE
"Subjective: Pt agreeable to therapeutic plan of care. Pt expressed discharging home today but agreeable to PT session this afternoon.    Objective:     Bed mobility - N/A or Not attempted.  Transfers - CGA and with rolling walker  Ambulation - 100 feet CGA, Min-A and with rolling walker    Therapeutic Exercise - 10 Reps B LE AROM supported sitting / chair and Long Sitting;   QS, HS (long sitting)  AP, LAQ, Marches (sitting eob)    Vitals: WNL    Pain: 7 VAS   Location: R knee  Intervention for pain: Repositioned, Increased Activity and Therapeutic Presence    Education: Provided education on the importance of mobility in the acute care setting, Verbal/Tactile Cues, Transfer Training, Gait Training and Energy conservation strategies    Assessment: Odalis Solo presents with functional mobility impairments which indicate the need for skilled intervention. Pt continues to display lack on R knee flexion throughout tx session. Pt performed seated ther ex prior to mobilization. Pt ambulated in hallway displaying two bouts of unsteadiness requiring min A from this PTA to maintain upright posture. Pt educated on heel strike and toe off during gait as pt displayed lack on knee flexion during gait training. Pt educated on safety at home. Tolerating session today without incident. Will continue to follow and progress as tolerated.     Plan/Recommendations:   Low Intensity Therapy recommended post-acute care - This is recommended as therapy feels this patient would require 2-3 visits per week. OP or HH would be the best option depending on patient's home bound status. Consider, if the patient has other  \"skilled\" needs such as wounds, IV antibiotics, etc. Combined with \"low intensity\" could also equate to a SNF. If patient is medically complex, consider LTAC.. Pt requires rolling walker at discharge.     Pt desires Home with family assist and and Home Health at discharge. Pt cooperative; agreeable to therapeutic recommendations " and plan of care.         Basic Mobility 6-click:  Rollin = Total, A lot = 2, A little = 3; 4 = None  Supine>Sit:   1 = Total, A lot = 2, A little = 3; 4 = None   Sit>Stand with arms:  1 = Total, A lot = 2, A little = 3; 4 = None  Bed>Chair:   1 = Total, A lot = 2, A little = 3; 4 = None  Ambulate in room:  1 = Total, A lot = 2, A little = 3; 4 = None  3-5 Steps with railin = Total, A lot = 2, A little = 3; 4 = None  Score: 19    Modified [place]mage: N/A = No pre-op stroke/TIA    Post-Tx Position: Up in Chair, Alarms activated and Call light and personal items within reach  PPE: gloves and surgical mask

## 2023-04-14 NOTE — PLAN OF CARE
Goal Outcome Evaluation:         Odalis Solo presents with functional mobility impairments which indicate the need for skilled intervention. Pt continues to display lack on R knee flexion throughout tx session. Pt performed seated ther ex prior to mobilization. Pt ambulated in hallway displaying two bouts of unsteadiness requiring min A from this PTA to maintain upright posture. Pt educated on heel strike and toe off during gait as pt displayed lack on knee flexion during gait training. Pt educated on safety at home. Tolerating session today without incident. Will continue to follow and progress as tolerated.

## 2023-04-14 NOTE — OUTREACH NOTE
Prep Survey    Flowsheet Row Responses   [facility] patient discharged from? [facility]   Is LACE score < 7 ? Yes   Eligibility [facility]   Date of Admission 04/13/23   Date of Discharge 04/14/23   Discharge Disposition Home or Self Care   Discharge diagnosis Status post knee replacement  TKA    Does the patient have one of the following disease processes/diagnoses(primary or secondary)? Total Joint Replacement   Does the patient have Home health ordered? Yes   What is the Home health agency?  [facility]   Is there a DME ordered? Yes   What DME was ordered? [employer] Rolling walker   Prep survey completed? Yes          Margareth SMITH - Registered Nurse

## 2023-04-14 NOTE — DISCHARGE INSTRUCTIONS
Total Knee  Discharge Instructions  Dr. MARCIA Alberts” Justin II  [phone]    INCISION CARE  Wash your hands prior to dressing changes  LU Wound VAC: Postoperatively you had a LU Wound Vac placed on the incision. This was placed under sterile conditions in the operating room. It remains in place for 7 days postoperatively. After 7 days, the entire dressing must be removed, including all of the sticky adhesive. The dressing and battery pack provide gentle suction to the incision and provide several benefits over a traditional dressing:  It maintains the sterile environment of the OR and reduces the risk of infection  The suction removes unwanted buildup of blood/hematoma under the skin to reduce swelling  The suction also promotes fresh blood supply to the skin and soft tissue to speed up healing  The postoperative scar is reduced in size  Showering is permitted immediately after surgery, but the battery pack must be protected or removed during the shower.   After 7 days the LU Wound Vac is removed. If there is no drainage, no dressing is required. If there is some scant drainage a dry bandage can be applied and changed daily until seen in the office or until the drainage stops.   No creams or ointments to the incisions until 4 weeks post op.  Do not touch or pick at the incision  Check incision every day and notify surgeon immediately if any of the following signs or symptoms are seen:  Increase in redness  Increase in swelling around the incision and of the entire extremity  Increase in pain  NEW drainage or oozing from the incision  Pulling apart of the edges of the incision  Increase in overall body temperature (greater than 100.4 degrees)  Zip-Line: your incision was closed with a state of the art device.   Is a non-invasive and easy to use wound closure device that replaces sutures, staples and glue for surgical incisions  It minimizes scarring and eliminating “railroad” marks that come with staples or  sutures  It makes removal as atraumatic as peeling off a bandage  Can be removed at home or by a physical therapist or nurse at 14 days postoperatively    ACTIVITIES  Exercises:  Physical therapy will begin immediately while in the hospital. Patients going to a nursing home will get therapy as part of their care at the SNU/SNF facility. Patients going home may also have a therapist come to the house to help them mobilize until they can safely get to an outpatient therapy facility.  Elevate the affected leg most of the day during the first week post operatively. Caution must be taken to avoid pillow placement directly under the heel of the leg, as this can cause pressure ulcers even with a soft pillow. All pillows and blankets should be placed underneath of the thigh and calf so that the heel is free-floating.  Use cold packs for 20-30 minutes approximately 5 times per day.  You should perform the daily stretching and strengthening exercises as taught by the therapist as often as possible. This can be done many times a day.  Full weight bearing is allowed after surgery. It will be sore/painful to put weight on the leg, but this will help the bone to heal and prevent complications such as pneumonia, bed sores and blood clots. Mobilization is vital to the recovery process.  Activities of Daily Living:  No tub baths, hot tubs, or swimming pools for 4 weeks.  May shower and let water run over the incision immediately after surgery. The battery pack of the LU Wound Vac must be protected or removed while in the shower. After the LU is removed 7 days after surgery showering is permitted as long as there is no drainage from the wound.     Restrictions  Weight: It is ok to allow full weight bearing after surgery. Weight on the leg actually quickens the recovery process. While it will be sore/painful to put weight on the leg, it is safe to do so. Hip replacement after hip fracture has a much slower recover process. It can  take months to heal fully from a hip fracture and patients even make some slow benefits up to a year afterwards.   Driving: Many patients have questions about when it is safe to return to driving. The answer is that this is extremely variable. It depends on the extent of the surgery, as well as how quickly you heal. Certainly left leg surgeries make returning to driving easier while right leg surgeries require more extensive rehabilitation before driving can be safe. Until you can press down on the brake hard, and are off of all narcotics, driving is not permitted. Your surgeon cannot “clear” you to return to driving, only you can make the decision when you feel it is safe.    Medications  Anticoagulants: After upper extremity surgery most patients do not require an anticoagulant unless you have another injury that will be keeping you from mobilizing. Lower extremity surgery typically does require use of an anticoagulation medicine.   IF YOU HAD LOWER EXTREMITY SURGERY AND ARE NOT DISCHARGED HOME WITH ANY ANTICOAGULANT MEDICINE YOU SHOULD TAKE ASPIRIN 325mg DAILY FOR 30 DAYS POSTOPERATIVELY.  If you are discharged home with an anticoagulant such as Aspirin, Xarelto, Eliquis, Coumadin, or Lovenox, follow these simple instructions:   Notify surgeon immediately if any kate bleeding is noted in the urine, stool, emesis, or from the nose or the incision. Blood in the stool will often appear as black rather than red. Blood in urine may appear as pink. Blood in emesis may appear as brown/black like coffee grounds.  You will need to apply pressure for longer periods of time to any cuts or abrasions to stop bleeding  Avoid alcohol while taking anticoagulants  Most anticoagulants are to be taken for 30 days postoperatively. After this time, you may stop using them unless instructed otherwise.   If you were already taking an anticoagulant (commonly Aspirin, Coumadin, or Plavix) you will likely be resuming your normal dose  postoperatively and will be continuing that medication at the discretion of the prescribing physician.  Stool Softeners: You will be at greater risk of constipation after surgery due to being less mobile and the pain medications.  Take stool softeners as needed. Over the counter Colace 100 mg 1-2 capsules twice daily can be taken.  If stools become too loose or too frequent, please decreases the dosage or stop the stool softener.  If constipation occurs despite use of stool softeners, you are to continue the stool softeners and add a laxative (Milk of Magnesia 1 ounce daily as needed)  Drink plenty of fluids, and eat fruits and vegetables during your recovery time. Getting up and mobilizing will help the bowels to recover their regular function, as will weaning off of all narcotics when the pain becomes tolerable.  Pain Medications: Utilized after surgery are narcotics. This is some general information about these medications.  CLASSIFICATION: Pain medications are called Opioids and are narcotics  LEGALITIES: It is illegal to share narcotics with others  DRIVING: it is illegal to drive while under the influence of narcotics. Doing so is a DUI.  POTENTIAL SIDE EFFECTS: nausea, vomiting, itching, dizziness, drowsiness, dry mouth, constipation, and difficulty urinating.  POTENTIAL ADVERSE EFFECTS:  Opioid tolerance can develop with use of pain medications and this simply means that it requires more and more of the medication to control pain. However, this is seen more in patients that use opioids for longer periods of time.  Opioid dependence can develop with use of Opioids. People with opioid dependence will experience withdrawal symptoms upon cessation of the medication.  Opioid addiction can develop with use of Opioids. The incidence of this is very unlikely in patients who take the medications as ordered and stop the medications as instructed.  Opioid overdose can be dangerous, but is unlikely when the medication  is taken as ordered and stopped when ordered. It is important not to mix opioids with alcohol as this can lead to over sedation and respiratory difficulty.  DOSAGE:  After the initial surgical pain begins to resolve, you may begin to decrease the pain medication. By the end of a few weeks, you should be off of pain medications.  Refills will not be given by the office during evening hours, on weekends, or after 6 weeks post-op. You are responsible for weaning off of pain medication. You can increase the time between narcotic pills, taking one every 4 then 6 then 8 hours and so on.  To seek refills on pain medications during the initial 6-week post-operative period, you must call the office to request the refill. The office will then notify you when to  the prescription. DO NOT wait until you are out of the medication to request a refill. Prescriptions will not be filled over the weekend and depending on the schedule, it may take a couple days for the prescription to be available. Someone will have to pick the prescription in person at the office.    FOLLOW-UP VISITS  You will need to follow up in the office with your surgeon in 3 weeks, or as instructed elsewhere in your discharge paperwork. Please call this number [phone] to schedule this appointment. If you are going to an SNF/SNU facility, they will arrange for you to follow up in the office.  If you have any concerns or suspected complications prior to your follow up visit, please call the office. Do not wait until your appointment time if you suspect complications. These will need to be addressed in the office promptly.      Davey Anderson II, MD  Orthopaedic Surgery  [facility]

## 2023-04-14 NOTE — THERAPY TREATMENT NOTE
"Subjective: Pt agreeable to therapeutic plan of care.    Objective:     Bed mobility - SBA  Transfers - CGA and with rolling walker  Ambulation - 100 feet CGA, Min-A and with rolling walkerwith 100% cueing for safe step sequencing and walker use and cueing for improved heel contact RLE at initial contact and to allow right knee to flex during swing phase.    WB'ing status: R Lower Extremity Weight Bearing As Tolerated    ROM: 0 to ~ 70 degrees right knee    Therapeutic Exercise: 10 Reps R Lower Extremity AAROM Total Knee: Ankle Pumps, Quad Sets, Heel slides, Hip Abduction, LAQ    Precautions: None  Vitals: WNL    Pain: 10 VAS   Location: RLE  Intervention for pain: Repositioned, RN provided medication, RN notified, Increased Activity and Therapeutic Presence    Education: Provided education on the importance of mobility in the acute care setting, Transfer Training, Gait Training, WB'ing status and Post-Op Precautions    Assessment: Odalis Solo presents with functional mobility impairments which indicate the need for skilled intervention.  Pt had 2 episodes where she let go of the walker while up on her feet due to a jab of pain. PT instructed pt to not release her hands on the walker due to risk for falls. Plan was for pt' sister to stay with her post-op and sister can no longer do that. Pt will call her son to see if he can take her home and stay with her as needed. P Tolerating session today without incident. Will continue to follow and progress as tolerated.     Plan/Recommendations:   Low Intensity Therapy recommended post-acute care - This is recommended as therapy feels this patient would require 2-3 visits per week. OP or HH would be the best option depending on patient's home bound status. Consider, if the patient has other  \"skilled\" needs such as wounds, IV antibiotics, etc. Combined with \"low intensity\" could also equate to a SNF. If patient is medically complex, consider LTAC.. Pt requires rolling " walker at discharge.     Pt desires Home with family assist and and Home Health at discharge. Pt cooperative; agreeable to therapeutic recommendations and plan of care.         Basic Mobility 6-click:  Rollin = Total, A lot = 2, A little = 3; 4 = None  Supine>Sit:   1 = Total, A lot = 2, A little = 3; 4 = None   Sit>Stand with arms:  1 = Total, A lot = 2, A little = 3; 4 = None  Bed>Chair:   1 = Total, A lot = 2, A little = 3; 4 = None  Ambulate in room:  1 = Total, A lot = 2, A little = 3; 4 = None  3-5 Steps with railin = Total, A lot = 2, A little = 3; 4 = None  Score: 19    Modified [place]: N/A = No pre-op stroke/TIA    Post-Tx Position: Up in Chair and Call light and personal items within reach  PPE: gloves

## 2023-04-14 NOTE — PROGRESS NOTES
Spoke to patient She is agreeable to [facility] services     No other agencies in the home    Demographics verified      Dr Anderson   PT

## 2023-04-14 NOTE — CASE MANAGEMENT/SOCIAL WORK
Discharge Planning Assessment  [facility]     Patient Name: Odalis Solo  MRN: [id number]  Today's Date: 4/14/2023    Admit Date: 4/13/2023    Plan: Home with family and [facility]. Order in.  Rolling walker [employer]   Discharge Needs Assessment     Row Name 04/14/23 1554       Living Environment    People in Home alone    Current Living Arrangements home    Primary Care Provided by self    Provides Primary Care For no one    Family Caregiver if Needed child(larry), adult;sibling(s)    Quality of Family Relationships supportive    Able to Return to Prior Arrangements yes       Resource/Environmental Concerns    Resource/Environmental Concerns none    Transportation Concerns none       Transition Planning    Patient/Family Anticipates Transition to home with help/services;home with family    Patient/Family Anticipated Services at Transition home health care    Transportation Anticipated family or friend will provide       Discharge Needs Assessment    Readmission Within the Last 30 Days no previous admission in last 30 days    Equipment Currently Used at Home glucometer    Concerns to be Addressed care coordination/care conferences;discharge planning    Anticipated Changes Related to Illness none    Equipment Needed After Discharge walker, rolling    Outpatient/Agency/Support Group Needs homecare agency    Discharge Facility/Level of Care Needs home with home health    Provided Post Acute Provider List? Yes    Post Acute Provider List Home Health    Delivered To Patient    Method of Delivery Telephone    Patient's Choice of Community Agency(s) [facility]               Discharge Plan     Row Name 04/14/23 9618       Plan    Plan Home with family and [facility]. Order in.  Rolling walker [employer]    Patient/Family in Agreement with Plan yes    Plan Comments Met with patient at bedside.  Lives alone however her son is going to stay with her a few days.  Confirmed pcp and pharmacy.  Family can transport at [place].  Needs home health and  selected [facility] and they accepted.  Rolling walker delivered by [employer]  Denies other dc needs at this time.              Continued Care and Services - Admitted Since 4/13/2023     Durable Medical Equipment Coordination complete.    Service Provider Request Status Selected Services Address Phone Fax Patient Preferred    [facility]  Selected Durable Medical Equipment [id number], [facility] [id number] [phone] --          Home Medical Care Coordination complete.    Service Provider Request Status Selected Services Address Phone Fax Patient Preferred    [facility]  Selected Home Health Services [address] [address] [phone] --              Expected Discharge Date and Time     Expected Discharge Date Expected Discharge Time    Apr 14, 2023          Demographic Summary     Row Name 04/14/23 1553       General Information    Admission Type observation    Arrived From home    Referral Source admission list    Reason for Consult discharge planning    Preferred Language English       Contact Information    Permission Granted to Share Info With                Functional Status     Row Name 04/14/23 1554       Functional Status    Usual Activity Tolerance moderate    Current Activity Tolerance moderate       Functional Status, IADL    Medications independent    Meal Preparation independent    Housekeeping independent    Laundry independent    Shopping independent       Mental Status    General Appearance WDL WDL       Mental Status Summary    Recent Changes in Mental Status/Cognitive Functioning no changes                         Dinah Brooks, AARON   Met with patient in room wearing PPE: mask, face shield/goggles, gloves, gown.      Maintained distance greater than six feet and spent less than 15 minutes in the room.

## 2023-04-14 NOTE — PLAN OF CARE
Goal Outcome Evaluation:   Pt c/o pain. Prn meds given, see Mar. Pain seems to be controlled at this moment. On s/s of distress. Pt is resting in bed with call light within reach and bed alarm is set. Ongoing care continues

## 2023-04-15 ENCOUNTER — HOME CARE VISIT (OUTPATIENT)
Dept: HOME HEALTH SERVICES | Facility: HOME HEALTHCARE | Age: 68
End: 2023-04-15
Payer: COMMERCIAL

## 2023-04-15 VITALS
SYSTOLIC BLOOD PRESSURE: 125 MMHG | OXYGEN SATURATION: 98 % | DIASTOLIC BLOOD PRESSURE: 80 MMHG | HEART RATE: 78 BPM | TEMPERATURE: 98.1 F | RESPIRATION RATE: 18 BRPM

## 2023-04-15 PROCEDURE — G0151 HHCP-SERV OF PT,EA 15 MIN: HCPCS

## 2023-04-15 NOTE — HOME HEALTH
"Assessment/Referral:  Pt is a 68 y/o female pt of Dr. Justin MUNROE s/p R TKR on 04/13/23 with discharge home 04/14/23.  Pt opted to have R TKR when conservative measures of pain management were no longer effective in treating OA pain.  Pt seated on couch with adult son present upon PT arrival.  Pt states pain in R knee and PT notes excessive swelling in R foot due to tight ace bandage.  PT removed ace bandage from pt's RLE and noted improvement in R foot edema.  PT evaluated pt's mobility/balance and noted pt to have antalgic gait with limited RLE stance time and decreased R knee flexion during swing phase of gait.  PT instructed on proper step through gait pattern and use of wheeled walker for upright stability.  Pt will benefit from continued skilled home care PT for strengthening, R knee ROM, gait/balance training and progression of mobility    Primary Focus of Care:  impaired mobility related to R TKR    PLOF/Environment:  Pt lives alone in 1st floor apartment but has adult son assisting as needed.  Pt was previously independent with all mobility and self care without AD.     Pt's Personal Goal:  Pt reports her primary goal is to \"get back to cleaning and regular mobility\"    Homebound reason(s):  Pt is homebound due to considerable taxing effort to leave home including:  falls risk, pain, limited R knee ROM, impaired mobility    Plan for Next Visit:  strengthening, R knee ROM, gait training"

## 2023-04-15 NOTE — DISCHARGE SUMMARY
Orthopaedic Discharge Summary  Dr. MARCIA Alberts” [place]  [phone]    NAME: Odalis Solo PCP: Aubree Alas MD   :  MRN: [id number]  [id number] LOS:  ADMIT: 0 days  2023   AGE/SEX: 67 y.o. female DC:  today             · Admitting Diagnosis: Osteoarthritis of right knee [M17.11]  · Status post knee replacement [Z96.659]    · Surgery Performed: [place] ARTHRP KNE CONDYLE&PLATU MEDIAL&LAT COMPARTMENTS [60601] (TOTAL KNEE ARTHROPLASTY WITH CORI ROBOT)    · Discharge Medications:         Discharge Medications      New Medications      Instructions Start Date   ondansetron 4 MG tablet  Commonly known as: Zofran   4 mg, Oral, Every 6 Hours PRN      ondansetron 4 MG tablet  Commonly known as: Zofran   4 mg, Oral, Every 6 Hours PRN      oxyCODONE-acetaminophen 5-325 MG per tablet  Commonly known as: PERCOCET   1 tablet, Oral, Every 4 Hours PRN         Changes to Medications      Instructions Start Date   aspirin 81 MG EC tablet  What changed:   · medication strength  · See the new instructions.   81 mg, Oral, Every 12 Hours      Aspirin Low Dose 81 MG EC tablet  Generic drug: aspirin  What changed: You were already taking a medication with the same name, and this prescription was added. Make sure you understand how and when to take each.   81 mg, Oral, Every 12 Hours      gabapentin 600 MG tablet  Commonly known as: NEURONTIN  What changed: additional instructions   TAKE 1 TABLET BY MOUTH TWICE DAILY      glipizide 5 MG tablet  Commonly known as: GLUCOTROL  What changed:   · when to take this  · additional instructions   TAKE 1 TABLET BY MOUTH TWICE DAILY      metoprolol tartrate 100 MG tablet  Commonly known as: LOPRESSOR  What changed: additional instructions   TAKE 1 TABLET BY MOUTH TWICE DAILY.      Praluent 75 MG/ML solution auto-injector  Generic drug: Alirocumab  What changed: See the new instructions.   INJECT 1 ML INTO THE SKIN INTO THE APPROPRIATE AREA AS DIRECTED EVERY 14 DAYS      Vascepa 1 g  capsule capsule  Generic drug: icosapent ethyl  What changed:   · how much to take  · additional instructions   TAKE 2 CAPSULES BY MOUTH TWICE DAILY WITH MEALS         Continue These Medications      Instructions Start Date   acetaminophen 500 MG tablet  Commonly known as: TYLENOL   1,000 mg, Oral, Every 6 Hours, None preop      amLODIPine 5 MG tablet  Commonly known as: NORVASC   5 mg, Oral, Daily      diphenhydrAMINE 25 mg capsule  Commonly known as: BENADRYL   25 mg, Oral, As Needed, None preop      DULoxetine 60 MG capsule  Commonly known as: CYMBALTA   60 mg, Oral, Daily, Take preop      fenofibrate 160 MG tablet   160 mg, Oral, Daily With Dinner      furosemide 40 MG tablet  Commonly known as: LASIX   40 mg, Oral, Every Morning, Hold DOS         ASK your doctor about these medications      Instructions Start Date   Dulaglutide 1.5 MG/0.5ML solution pen-injector   1.5 mg, Subcutaneous, Weekly      losartan 50 MG tablet  Commonly known as: Cozaar   50 mg, Oral, 2 Times Daily             · Vitals:     Vitals:    04/13/23 2029 04/14/23 0000 04/14/23 0400 04/14/23 0927   BP: 139/79 134/71 119/66 134/71   BP Location: Right arm Right arm Right arm Right arm   Patient Position: Sitting Sitting Lying Lying   Pulse:  90 84 92   Resp:  17 16 16   Temp:  99 °F (37.2 °C) 99.7 °F (37.6 °C)    TempSrc:  Oral Oral    SpO2:  95% (!) 89% 94%   Weight:       Height:           · Labs:      Admission on 04/13/2023, Discharged on 04/14/2023   Component Date Value Ref Range Status   • Glucose 04/13/2023 118 (H)  70 - 105 mg/dL Final    Serial Number: [id number]:  [id number]   • Glucose 04/13/2023 185 (H)  70 - 105 mg/dL Final    Serial Number: [id number]:  [id number]   • Glucose 04/13/2023 103  70 - 105 mg/dL Final    Serial Number: [id number]:  [id number]   • Glucose 04/14/2023 102 (H)  65 - 99 mg/dL Final   • BUN 04/14/2023 21  8 - 23 mg/dL Final   • Creatinine 04/14/2023 1.22 (H)  0.57 - 1.00 mg/dL Final   •  Sodium 04/14/2023 134 (L)  136 - 145 mmol/L Final   • Potassium 04/14/2023 4.3  3.5 - 5.2 mmol/L Final   • Chloride 04/14/2023 100  98 - 107 mmol/L Final   • CO2 04/14/2023 24.0  22.0 - 29.0 mmol/L Final   • Calcium 04/14/2023 8.7  8.6 - 10.5 mg/dL Final   • BUN/Creatinine Ratio 04/14/2023 17.2  7.0 - 25.0 Final   • Anion Gap 04/14/2023 10.0  5.0 - 15.0 mmol/L Final   • eGFR 04/14/2023 49.0 (L)  >60.0 mL/min/1.73 Final   • WBC 04/14/2023 6.90  3.40 - 10.80 10*3/mm3 Final   • RBC 04/14/2023 3.42 (L)  3.77 - 5.28 10*6/mm3 Final   • Hemoglobin 04/14/2023 10.6 (L)  12.0 - 15.9 g/dL Final   • Hematocrit 04/14/2023 31.4 (L)  34.0 - 46.6 % Final   • MCV 04/14/2023 91.8  79.0 - 97.0 fL Final   • MCH 04/14/2023 30.8  26.6 - 33.0 pg Final   • MCHC 04/14/2023 33.6  31.5 - 35.7 g/dL Final   • RDW 04/14/2023 13.6  12.3 - 15.4 % Final   • RDW-SD 04/14/2023 45.9  37.0 - 54.0 fl Final   • MPV 04/14/2023 8.7  6.0 - 12.0 fL Final   • Platelets 04/14/2023 209  140 - 450 10*3/mm3 Final   • Glucose 04/14/2023 134 (H)  70 - 105 mg/dL Final    Serial Number: [id number]:  [id number]   • Glucose 04/14/2023 186 (H)  70 - 105 mg/dL Final    Serial Number: [id number]:  [id number]        No results found.    · Hospital Course:   67 y.o. female was admitted to [facility] to services of Davey Anderson II, MD with Osteoarthritis of right knee [M17.11]  Status post knee replacement [Z96.659] on 4/13/2023 and underwent [place] ARTHRP KNE CONDYLE&PLATU MEDIAL&LAT COMPARTMENTS [27804] (TOTAL KNEE ARTHROPLASTY WITH CORI ROBOT). Post-operatively the patient transferred to the floor where the patient underwent mobilization therapy. Opioids were titrated to achieve appropriate pain management to allow for participation in mobilization exercises. Vital signs and laboratory values are now within safe parameters for discharge. The dressings and/or incision is intact without signs or symptoms of active infection. Operative extremity  "neurovascular status remains intact as compared to the preoperative exam. Appropriate education re: incision care, activity levels, medications, and follow up visits was completed and all questions were answered. The patient is now deemed stable for discharge.    HOME: The patient progressed well with physical therapy. There were cleared for discharge to home. The patietn was sent home in good condition}.       R \"John\" Justin MUNROE MD  Orthopaedic Surgery  [facility]  [phone]                                               "

## 2023-04-16 ENCOUNTER — HOME CARE VISIT (OUTPATIENT)
Dept: HOME HEALTH SERVICES | Facility: HOME HEALTHCARE | Age: 68
End: 2023-04-16
Payer: COMMERCIAL

## 2023-04-17 ENCOUNTER — HOME CARE VISIT (OUTPATIENT)
Dept: HOME HEALTH SERVICES | Facility: HOME HEALTHCARE | Age: 68
End: 2023-04-17
Payer: COMMERCIAL

## 2023-04-17 ENCOUNTER — TRANSITIONAL CARE MANAGEMENT TELEPHONE ENCOUNTER (OUTPATIENT)
Dept: CALL CENTER | Facility: HOSPITAL | Age: 68
End: 2023-04-17
Payer: MEDICARE

## 2023-04-17 VITALS
SYSTOLIC BLOOD PRESSURE: 132 MMHG | RESPIRATION RATE: 15 BRPM | TEMPERATURE: 98.2 F | HEART RATE: 82 BPM | DIASTOLIC BLOOD PRESSURE: 68 MMHG | OXYGEN SATURATION: 95 %

## 2023-04-17 PROCEDURE — G0157 HHC PT ASSISTANT EA 15: HCPCS

## 2023-04-17 NOTE — OUTREACH NOTE
Call Center TCM Note    Flowsheet Row Responses   [facility] patient discharged from? [facility]   Does the patient have one of the following disease processes/diagnoses(primary or secondary)? Total Joint Replacement   Joint surgery performed? Knee  [right]   TCM attempt successful? Yes   Call start time 1412   Call end time 1429   Discharge diagnosis Status post knee replacement  TKA    Person spoke with today (if not patient) and relationship pt   Does the patient have all medications related to this admission filled (includes all antibiotics, pain medications, etc.) Yes   Is the patient taking all medications as directed (includes completed medication regime)? Yes   Is the patient able to teach back alternate methods of pain control? Ice, Reposition, Correct alignment, Short, frequent activity, Other   Comments Post-Op 5/15/23 at 9:30 AM   Does the patient have an appointment with their PCP within 7 days of discharge? No appointments available   Nursing Interventions Routed TCM call to PCP office   What is the Home health agency?  [facility]   Has home health visited the patient within 72 hours of discharge? Yes   What DME was ordered? [employer] walker   Has all DME been delivered? Yes   Psychosocial issues? No   Has the patient began therapy sessions (either in the home or as an out patient)? Yes   If the patient has started attending therapy, what post op day did they begin to attend (either in home or as an out patient)?   4/17/23   Does the patient have a wound vac in place? Yes   Date wound vac should be removed 04/20/23   Has the patient fallen since discharge? No   Did the patient receive a copy of their discharge instructions? Yes   Nursing interventions Reviewed instructions with patient   What is the patient's perception of their functional status since discharge? Improving   Is the patient able to teach back signs and symptoms of infection? Temp >100.4 for 24h or longer, Incisional drainage, Blisters  around incision, Increased swelling or redness around incision (not associated with surgical edema), Severe discomfort or pain, Changes in mobility, Shortness of breath or chest pain   Is the patient able to teach back how to prevent infection? Shower only as directed by surgeon, Eat well-balanced diet, No lotion or creams, Check incision daily   Is the patient able to teach back signs and symptoms of DVT? Redness in calf, Swelling in calf, Area hot to touch, Severe pain in calf, Shortness of breath or chest pain   If the patient is a current smoker, are they able to teach back resources for cessation? Not a smoker   Is the patient/caregiver able to teach back the hierarchy of who to call/visit for symptoms/problems? PCP, Specialist, Home health nurse, Urgent Care, ED, 911 Yes   TCM call completed? Yes   Wrap up additional comments Pt states she is doing ok, still has some pain, and pt denies fever, increased redness, swelling at incisional site. Pt does have a LU drain. PT visits. RN will route needed TCM appt to PCP office as there are no appts avialable that satisfy TCM guidelines. Pt verified Post-Op appt on 5/15/23.   Call end time [id number]   Would this patient benefit from a Referral to [facility] Social Work? No   Is the patient interested in additional calls from an ambulatory ?  NOTE:  applies to high risk patients requiring additional follow-up. No          Karissa Zamudio RN    4/17/2023, 14:30 EDT

## 2023-04-18 NOTE — HOME HEALTH
pt up and is ready for PT session,  feeling well and is motivated to improve and return to plf.    pt is taking pain meds prn but is weaning from this.     pt has removed her kathy box reporting it was not functioning properly.     incision covered with non removable dressing and with noted drainage at proximal incision.         pt was challenged to to stand for extended periods of time and needed cues to properly advance the LEs with correct R knee flexion during gait trg, pt required instruction throughout PT hep to maintain proper form with hep review, and to avoid compensatory movememt.  pt was encouraged to ice/elevate following PT session.               plan for next  PT  visit-  cont gait trg,  rom and strengthening per tkr protocol .

## 2023-04-19 ENCOUNTER — HOME CARE VISIT (OUTPATIENT)
Dept: HOME HEALTH SERVICES | Facility: HOME HEALTHCARE | Age: 68
End: 2023-04-19
Payer: COMMERCIAL

## 2023-04-19 VITALS
OXYGEN SATURATION: 98 % | SYSTOLIC BLOOD PRESSURE: 130 MMHG | DIASTOLIC BLOOD PRESSURE: 68 MMHG | TEMPERATURE: 98.3 F | HEART RATE: 90 BPM

## 2023-04-19 PROCEDURE — G0157 HHC PT ASSISTANT EA 15: HCPCS

## 2023-04-20 NOTE — HOME HEALTH
pt up and prepared for PT session with no new c/o's.   mild R knee pain.   pt reports she  is motivated to improve and return to plf.   pt has been attempting hep review as tolerated but with limitations.    dressing in place,  to be removed next visit and with mild drainage proximally.         pt was compliant, participated well but was guarded of the RLE with noted weakness and rom deficits.  pt was challenged today to stand for extended periods of time and was guarded of the RLE.  pt was minimally unsteady upon standing but self corrected.    pt was encouraged to ice/elevate following PT session.  pt was given several cues today to fully contract/hold at end rom with hep review but struggled with this.           plan for next session-  cont PT per tkr protocol and advance as tolerated including rom/strengthening and gait trg

## 2023-04-21 ENCOUNTER — HOME CARE VISIT (OUTPATIENT)
Dept: HOME HEALTH SERVICES | Facility: HOME HEALTHCARE | Age: 68
End: 2023-04-21
Payer: COMMERCIAL

## 2023-04-21 PROCEDURE — G0157 HHC PT ASSISTANT EA 15: HCPCS

## 2023-04-23 VITALS
DIASTOLIC BLOOD PRESSURE: 74 MMHG | RESPIRATION RATE: 15 BRPM | TEMPERATURE: 97.5 F | HEART RATE: 82 BPM | OXYGEN SATURATION: 96 % | SYSTOLIC BLOOD PRESSURE: 138 MMHG

## 2023-04-24 ENCOUNTER — HOME CARE VISIT (OUTPATIENT)
Dept: HOME HEALTH SERVICES | Facility: HOME HEALTHCARE | Age: 68
End: 2023-04-24
Payer: COMMERCIAL

## 2023-04-24 ENCOUNTER — TELEPHONE (OUTPATIENT)
Dept: FAMILY MEDICINE CLINIC | Facility: CLINIC | Age: 68
End: 2023-04-24
Payer: MEDICARE

## 2023-04-24 VITALS
DIASTOLIC BLOOD PRESSURE: 95 MMHG | HEART RATE: 75 BPM | TEMPERATURE: 97.7 F | RESPIRATION RATE: 18 BRPM | OXYGEN SATURATION: 100 % | SYSTOLIC BLOOD PRESSURE: 140 MMHG

## 2023-04-24 PROCEDURE — G0151 HHCP-SERV OF PT,EA 15 MIN: HCPCS

## 2023-04-24 NOTE — HOME HEALTH
pt up and is prepared for PT session.   no new c/o's but with continued R knee pain.  pt is motivated to improve and return to plf.    pt reporting no med changes or complications.   pt reporting no med changes and no falls.   pt returns to ortho on 5/15 and begins OP PT next week.       pt was compliant throughout PT session,  tolerated this visit well and is motivated to improve and return to plf.  pt reported only mild pain increases with activity but well managed.   incision is covered with non removable dressing and no changes noted in drainage.    pt was challenged today to properly advance the RLE  with limited R knee flexion.  pt was educated on proper sequencing and to maintain proper knee flexion    plan for next visit- cont PT with gait trg, hep review,  rom activities

## 2023-04-24 NOTE — TELEPHONE ENCOUNTER
PA sent 04/24/23 for Praluent 75MG/ML Auto-injectors.  Key: JONNIE KAT Case ID: [id number]   Rx #[id number]

## 2023-04-24 NOTE — HOME HEALTH
Pt reports feeling well today but states she is still not sleeping well at night due to R knee pain.  Pt now ambulating independently with or without AD.  Pt met all goals with skilled home care PT and is prepared for discharge with no further questions or concerns.  Pt verbalized understanding and signed discharge agreement for discharge from home care PT as of 04/24/23.  Pt has OPPT scheduled for Wednesday 04/26.

## 2023-05-19 RX ORDER — DULAGLUTIDE 1.5 MG/.5ML
INJECTION, SOLUTION SUBCUTANEOUS
Qty: 4 ML | Refills: 1 | Status: SHIPPED | OUTPATIENT
Start: 2023-05-19

## 2023-06-18 RX ORDER — ALIROCUMAB 75 MG/ML
INJECTION, SOLUTION SUBCUTANEOUS
Qty: 2 ML | Refills: 3 | Status: SHIPPED | OUTPATIENT
Start: 2023-06-18

## 2023-06-18 NOTE — TELEPHONE ENCOUNTER
Rx Refill Note  Requested Prescriptions     Pending Prescriptions Disp Refills   • Praluent 75 MG/ML solution auto-injector [Pharmacy Med Name: PRALUENT 75MG/ML PF PEN INJ 2X1ML] 2 mL 3     Sig: INJECT 1 ML INTO THE SKIN IN THE APPROPRIATE AREA AS DIRECTED EVERY 14 DAYS.      Last office visit with prescribing clinician: 3/23/2023      Next office visit with prescribing clinician: 6/23/2023   3}  Lizzy Taylor  06/17/23, 22:28 EDT

## 2023-06-23 PROBLEM — K59.09 OTHER CONSTIPATION: Status: ACTIVE | Noted: 2023-06-23

## 2023-07-31 RX ORDER — FENOFIBRATE 160 MG/1
TABLET ORAL
Qty: 90 TABLET | Refills: 0 | Status: SHIPPED | OUTPATIENT
Start: 2023-07-31

## 2023-08-07 NOTE — PATIENT INSTRUCTIONS
Health Maintenance Due   Topic Date Due    DXA SCAN  Never done    Pneumococcal Vaccine 65+ (3 - PPSV23 or PCV20) 01/20/2023    COVID-19 Vaccine (6 - Moderna series) 03/15/2023    Continue Duloxitine daily and raylar every other day for one week and then stop both and start Prozac    Check blood pressure cuff for accuracy and send 10 blood pressures over 2 weeks.  Watch sodium, alcohol and weight

## 2023-08-08 ENCOUNTER — OFFICE VISIT (OUTPATIENT)
Dept: FAMILY MEDICINE CLINIC | Facility: CLINIC | Age: 68
End: 2023-08-08
Payer: MEDICARE

## 2023-08-08 VITALS
TEMPERATURE: 98.2 F | OXYGEN SATURATION: 98 % | HEART RATE: 79 BPM | WEIGHT: 145 LBS | DIASTOLIC BLOOD PRESSURE: 80 MMHG | HEIGHT: 59 IN | SYSTOLIC BLOOD PRESSURE: 121 MMHG | BODY MASS INDEX: 29.23 KG/M2

## 2023-08-08 DIAGNOSIS — Z78.0 POST-MENOPAUSAL: ICD-10-CM

## 2023-08-08 DIAGNOSIS — F32.A DEPRESSION, UNSPECIFIED DEPRESSION TYPE: ICD-10-CM

## 2023-08-08 DIAGNOSIS — G45.9 TRANSIENT CEREBRAL ISCHEMIA, UNSPECIFIED TYPE: ICD-10-CM

## 2023-08-08 DIAGNOSIS — M25.561 CHRONIC PAIN OF RIGHT KNEE: ICD-10-CM

## 2023-08-08 DIAGNOSIS — Z78.0 POST-MENOPAUSAL: Primary | ICD-10-CM

## 2023-08-08 DIAGNOSIS — E11.22 TYPE 2 DIABETES MELLITUS WITH STAGE 2 CHRONIC KIDNEY DISEASE, WITHOUT LONG-TERM CURRENT USE OF INSULIN: ICD-10-CM

## 2023-08-08 DIAGNOSIS — G89.29 CHRONIC PAIN OF RIGHT KNEE: ICD-10-CM

## 2023-08-08 DIAGNOSIS — N18.2 TYPE 2 DIABETES MELLITUS WITH STAGE 2 CHRONIC KIDNEY DISEASE, WITHOUT LONG-TERM CURRENT USE OF INSULIN: ICD-10-CM

## 2023-08-08 DIAGNOSIS — E66.3 OVERWEIGHT WITH BODY MASS INDEX (BMI) OF 28 TO 28.9 IN ADULT: ICD-10-CM

## 2023-08-08 DIAGNOSIS — I10 BENIGN ESSENTIAL HTN: ICD-10-CM

## 2023-08-08 RX ORDER — FLUOXETINE HYDROCHLORIDE 20 MG/1
20 CAPSULE ORAL DAILY
Qty: 30 CAPSULE | Refills: 1 | Status: SHIPPED | OUTPATIENT
Start: 2023-08-08

## 2023-08-08 NOTE — PROGRESS NOTES
Subjective   Odalis Solo is a 68 y.o. female presents for   Chief Complaint   Patient presents with    Depression     Follow up 4 weeks    Anxiety       Health Maintenance Due   Topic Date Due    DXA SCAN  Never done    Pneumococcal Vaccine 65+ (3 - PPSV23 or PCV20) 01/20/2023    COVID-19 Vaccine (6 - Moderna series) 03/15/2023     Odalis Solo is a 68-year-old female presented today to follow-up with DEXA scan, depression, hypertension, type 2 diabetes without long-term use of insulin, TIA, unspecified type and overweight with a body mass index of 28.    She does have an allergy to sulfa antibiotics.     Her depression is not getting any better. She does not believe the Vraylar is making any difference. She is on 1.5 mg of the Vraylar. She is still on 60 mg Cymbalta. She has been taking the Vraylar for a month now. She does not live with anyone but her cat. Her cat is her emotional support animal. [employer] [place] want her to get rid of the cat but she can not. She does have a sister but they do not talk. She does sometimes feel worse then will feel a little better. She was previously on Prozac in the past. She states Prozac did work well. She denies any homicidal or suicidal thoughts.     She is going to get the pneumonia vaccine when she gets her flu vaccine.     She might have to have more knee surgery. She still needs to do her bone density scan.    She does check her blood pressure at home. It is running normal.    Her blood sugars have been normal. Her eye exam is up to date.     She has not had any stroke symptoms.     She does watch her portion sizes. She will increase her exercise when her knee is better. She does do physical therapy. She does have a follow-up on 08/10/2023.     Vitals:    08/08/23 1506 08/08/23 1515 08/08/23 1516   BP: 143/90 138/91 121/80   BP Location: Right arm Left arm Left arm   Patient Position: Sitting Sitting Standing   Cuff Size: Adult Adult Adult   Pulse: 79     Temp: 98.2 øF  "(36.8 øC)     TempSrc: Tympanic     SpO2: 98%     Weight: 65.8 kg (145 lb)     Height: 151.1 cm (59.49\")       Body mass index is 28.81 kg/mý.    Current Outpatient Medications on File Prior to Visit   Medication Sig Dispense Refill    amLODIPine (NORVASC) 5 MG tablet Take 1 tablet by mouth Daily. Indications: High Blood Pressure Disorder      aspirin 81 MG EC tablet Take 1 tablet by mouth Daily.      Cariprazine HCl (Vraylar) 1.5 MG capsule capsule Take 1 capsule by mouth Daily. 30 capsule 1    fenofibrate 160 MG tablet TAKE 1 TABLET BY MOUTH EVERY DAY 90 tablet 0    furosemide (LASIX) 40 MG tablet Take 1 tablet by mouth Every Morning. Indications: High Blood Pressure Disorder      gabapentin (NEURONTIN) 600 MG tablet TAKE 1 TABLET BY MOUTH TWICE DAILY 180 tablet 0    glipizide (GLUCOTROL) 5 MG tablet TAKE 1 TABLET BY MOUTH TWICE DAILY 180 tablet 1    losartan (Cozaar) 50 MG tablet Take 1 tablet by mouth 2 (Two) Times a Day. 180 tablet 1    metoprolol tartrate (LOPRESSOR) 100 MG tablet TAKE 1 TABLET BY MOUTH TWICE DAILY. 180 tablet 3    Praluent 75 MG/ML solution auto-injector INJECT 1 ML INTO THE SKIN IN THE APPROPRIATE AREA AS DIRECTED EVERY 14 DAYS. 2 mL 3    Trulicity 1.5 MG/0.5ML solution pen-injector ADMINISTER 1.5 MG UNDER THE SKIN 1 TIME EVERY WEEK AS DIRECTED 4 mL 1    Vascepa 1 g capsule capsule TAKE 2 CAPSULES BY MOUTH TWICE DAILY WITH MEALS 120 capsule 0     No current facility-administered medications on file prior to visit.       The following portions of the patient's history were reviewed and updated as appropriate: allergies, current medications, past family history, past medical history, past social history, past surgical history, and problem list.    Review of Systems  DAXROS    Objective   Physical Exam  Vitals reviewed.   Constitutional:       General: She is not in acute distress.     Appearance: She is well-developed. She is not ill-appearing or toxic-appearing.      Comments: Overweight "   HENT:      Head: Normocephalic and atraumatic.      Right Ear: Tympanic membrane, ear canal and external ear normal.      Left Ear: Tympanic membrane, ear canal and external ear normal.      Nose: Nose normal.      Mouth/Throat:      Mouth: Mucous membranes are moist.      Pharynx: No posterior oropharyngeal erythema.   Eyes:      Extraocular Movements: Extraocular movements intact.      Conjunctiva/sclera: Conjunctivae normal.      Pupils: Pupils are equal, round, and reactive to light.   Cardiovascular:      Rate and Rhythm: Normal rate and regular rhythm.      Heart sounds: Normal heart sounds.   Pulmonary:      Effort: Pulmonary effort is normal.      Comments: Decreased breath sounds bilaterally  Abdominal:      General: Bowel sounds are normal. There is no distension.      Palpations: Abdomen is soft. There is no mass.      Tenderness: There is no abdominal tenderness.   Musculoskeletal:         General: Tenderness present.      Cervical back: Neck supple.   Skin:     General: Skin is warm.   Neurological:      General: No focal deficit present.      Mental Status: She is alert and oriented to person, place, and time.      Gait: Gait abnormal.   Psychiatric:         Mood and Affect: Mood normal.         Behavior: Behavior normal.     DAXEXAM  PHQ-9 Total Score:      Assessment & Plan   Diagnoses and all orders for this visit:    1. DXA (Primary)  -     DEXA Bone Density Axial; Future    2. Depression, unspecified depression type    3. Benign essential HTN    4. Type 2 diabetes mellitus with stage 2 chronic kidney disease, without long-term current use of insulin    5. Transient cerebral ischemia, unspecified type    6. Overweight with body mass index (BMI) of 28 to 28.9 in adult    7. Post-menopausal  -     DEXA Bone Density Axial; Future    8. Chronic pain of right knee    Other orders  -     FLUoxetine (PROzac) 20 MG capsule; Take 1 capsule by mouth Daily.  Dispense: 30 capsule; Refill: 1        1. DXA  (Primary)  -     DEXA Bone Density Axial; Future    2. Depression  -  FLUoxetine (PROzac) 20 MG capsule; Take 1 capsule by mouth Daily.  Dispense: 30 capsule; Refill: 1  - She will discontinue Cymbalta.     She will follow-up in one month.     Patient Instructions     Health Maintenance Due   Topic Date Due    DXA SCAN  Never done    Pneumococcal Vaccine 65+ (3 - PPSV23 or PCV20) 01/20/2023    COVID-19 Vaccine (6 - Moderna series) 03/15/2023    Continue Duloxitine daily and raylar every other day for one week and then stop both and start Prozac    Check blood pressure cuff for accuracy and send 10 blood pressures over 2 weeks.  Watch sodium, alcohol and weight      Transcribed from ambient dictation for Aubree Alas MD by Corine Lord.  08/08/23   16:06 EDT    Patient or patient representative verbalized consent to the visit recording.  I have personally performed the services described in this document as transcribed by the above individual, and it is both accurate and complete.

## 2023-08-18 ENCOUNTER — TELEPHONE (OUTPATIENT)
Dept: FAMILY MEDICINE CLINIC | Facility: CLINIC | Age: 68
End: 2023-08-18
Payer: MEDICARE

## 2023-08-18 NOTE — TELEPHONE ENCOUNTER
Please call patient and see if she has problems taking statin or would agree to do so along with her fenofibrate and Praluent and Vascepa.

## 2023-08-22 ENCOUNTER — HOSPITAL ENCOUNTER (OUTPATIENT)
Dept: BONE DENSITY | Facility: HOSPITAL | Age: 68
Discharge: HOME | End: 2023-08-22
Admitting: PREVENTIVE MEDICINE
Payer: MEDICARE

## 2023-08-22 ENCOUNTER — TELEPHONE (OUTPATIENT)
Dept: FAMILY MEDICINE CLINIC | Facility: CLINIC | Age: 68
End: 2023-08-22
Payer: MEDICARE

## 2023-08-22 DIAGNOSIS — Z78.0 POST-MENOPAUSAL: ICD-10-CM

## 2023-08-22 PROCEDURE — 77080 DXA BONE DENSITY AXIAL: CPT

## 2023-08-22 NOTE — TELEPHONE ENCOUNTER
HUB TO READ:  ----- Message from Aubree Alas MD sent at 8/22/2023  4:31 PM EDT -----  DEXA scan showed osteopenia or thinning of the bones.  Last check vitamin D and calcium levels were normal.  Please make sure you are getting 20 minutes of weightbearing exercise daily and if you would like to consider raloxifene to help prevent osteoporosis and if had no blood clots we would be glad to prescribe that for you please let us know

## 2023-08-22 NOTE — PROGRESS NOTES
DEXA scan showed osteopenia or thinning of the bones.  Last check vitamin D and calcium levels were normal.  Please make sure you are getting 20 minutes of weightbearing exercise daily and if you would like to consider raloxifene to help prevent osteoporosis and if had no blood clots we would be glad to prescribe that for you please let us know

## 2023-08-29 ENCOUNTER — OFFICE VISIT (OUTPATIENT)
Dept: FAMILY MEDICINE CLINIC | Facility: CLINIC | Age: 68
End: 2023-08-29
Payer: MEDICARE

## 2023-08-29 VITALS
HEIGHT: 59 IN | HEART RATE: 72 BPM | TEMPERATURE: 97.7 F | BODY MASS INDEX: 29.8 KG/M2 | SYSTOLIC BLOOD PRESSURE: 160 MMHG | DIASTOLIC BLOOD PRESSURE: 91 MMHG | WEIGHT: 147.8 LBS | OXYGEN SATURATION: 98 %

## 2023-08-29 DIAGNOSIS — F32.A DEPRESSION, UNSPECIFIED DEPRESSION TYPE: ICD-10-CM

## 2023-08-29 DIAGNOSIS — N18.2 TYPE 2 DIABETES MELLITUS WITH STAGE 2 CHRONIC KIDNEY DISEASE, WITHOUT LONG-TERM CURRENT USE OF INSULIN: ICD-10-CM

## 2023-08-29 DIAGNOSIS — Z23 NEED FOR VACCINATION: ICD-10-CM

## 2023-08-29 DIAGNOSIS — E11.22 TYPE 2 DIABETES MELLITUS WITH STAGE 2 CHRONIC KIDNEY DISEASE, WITHOUT LONG-TERM CURRENT USE OF INSULIN: ICD-10-CM

## 2023-08-29 DIAGNOSIS — E66.3 OVERWEIGHT WITH BODY MASS INDEX (BMI) OF 29 TO 29.9 IN ADULT: ICD-10-CM

## 2023-08-29 DIAGNOSIS — I10 BENIGN ESSENTIAL HTN: ICD-10-CM

## 2023-08-29 DIAGNOSIS — R05.9 COUGH, UNSPECIFIED TYPE: Primary | ICD-10-CM

## 2023-08-29 LAB
EXPIRATION DATE: NORMAL
FLUAV AG UPPER RESP QL IA.RAPID: NOT DETECTED
FLUBV AG UPPER RESP QL IA.RAPID: NOT DETECTED
INTERNAL CONTROL: NORMAL
Lab: NORMAL
SARS-COV-2 AG UPPER RESP QL IA.RAPID: NOT DETECTED
SARS-COV-2 RNA RESP QL NAA+PROBE: NOT DETECTED

## 2023-08-29 PROCEDURE — 87635 SARS-COV-2 COVID-19 AMP PRB: CPT | Performed by: PREVENTIVE MEDICINE

## 2023-08-29 NOTE — PROGRESS NOTES
"Subjective   Odalis Solo is a 68 y.o. female presents for   Chief Complaint   Patient presents with    Depression     4 week follow up    Anxiety   Patient has also had an intermittent cough and some clear to milky discharge without fever on and off for the last week.  She has not lost her sense of taste or smell has had some nausea with some diarrhea.  Was feeling well for couple of days but today she feels poorly.  No real change in her blood sugars rapid COVID and flu test were negative today    She is feeling much better on the Prozac and blood pressure is under good control.  Sleep is improved she does not feel homicidal or suicidal.  She knows to decrease her portion size and increase her walking to keep her weight under good control.    Health Maintenance Due   Topic Date Due    COVID-19 Vaccine (6 - Moderna series) 03/15/2023       Depression  Anxiety  Symptoms include nausea. Patient reports no chest pain.     Her past medical history is significant for depression.      Vitals:    08/29/23 1353 08/29/23 1404   BP: 150/93 160/91   BP Location: Right arm Left arm   Patient Position: Sitting Sitting   Cuff Size: Adult Adult   Pulse: 72    Temp: 97.7 øF (36.5 øC)    TempSrc: Tympanic    SpO2: 98%    Weight: 67 kg (147 lb 12.8 oz)    Height: 151.1 cm (59.49\")      Body mass index is 29.36 kg/mý.    Current Outpatient Medications on File Prior to Visit   Medication Sig Dispense Refill    amLODIPine (NORVASC) 5 MG tablet Take 1 tablet by mouth Daily. Indications: High Blood Pressure Disorder      aspirin 81 MG EC tablet Take 1 tablet by mouth Daily.      fenofibrate 160 MG tablet TAKE 1 TABLET BY MOUTH EVERY DAY 90 tablet 0    FLUoxetine (PROzac) 20 MG capsule Take 1 capsule by mouth Daily. 30 capsule 1    furosemide (LASIX) 40 MG tablet Take 1 tablet by mouth Every Morning. Indications: High Blood Pressure Disorder      gabapentin (NEURONTIN) 600 MG tablet TAKE 1 TABLET BY MOUTH TWICE DAILY 180 tablet 0    " glipizide (GLUCOTROL) 5 MG tablet TAKE 1 TABLET BY MOUTH TWICE DAILY 180 tablet 1    losartan (Cozaar) 50 MG tablet Take 1 tablet by mouth 2 (Two) Times a Day. 180 tablet 1    metoprolol tartrate (LOPRESSOR) 100 MG tablet TAKE 1 TABLET BY MOUTH TWICE DAILY. 180 tablet 3    Praluent 75 MG/ML solution auto-injector INJECT 1 ML INTO THE SKIN IN THE APPROPRIATE AREA AS DIRECTED EVERY 14 DAYS. 2 mL 3    Trulicity 1.5 MG/0.5ML solution pen-injector ADMINISTER 1.5 MG UNDER THE SKIN 1 TIME EVERY WEEK AS DIRECTED 4 mL 1    Vascepa 1 g capsule capsule TAKE 2 CAPSULES BY MOUTH TWICE DAILY WITH MEALS 120 capsule 0     No current facility-administered medications on file prior to visit.       The following portions of the patient's history were reviewed and updated as appropriate: allergies, current medications, past family history, past medical history, past social history, past surgical history, and problem list.    Review of Systems   Constitutional:  Positive for activity change. Negative for fever.   HENT:  Positive for congestion, postnasal drip and sinus pressure. Negative for ear pain and sore throat.    Respiratory:  Positive for cough.    Cardiovascular:  Negative for chest pain.   Gastrointestinal:  Positive for diarrhea and nausea. Negative for vomiting.   Genitourinary:  Negative for dysuria.   Psychiatric/Behavioral:  Positive for dysphoric mood.      Objective   Physical Exam  Constitutional:       Appearance: She is not ill-appearing.      Comments: Overweight   HENT:      Head: Normocephalic and atraumatic.      Right Ear: Tympanic membrane normal.      Left Ear: Tympanic membrane normal.      Nose: Nose normal. Rhinorrhea present.      Mouth/Throat:      Mouth: Mucous membranes are moist.      Pharynx: No oropharyngeal exudate or posterior oropharyngeal erythema.   Eyes:      Extraocular Movements: Extraocular movements intact.      Conjunctiva/sclera: Conjunctivae normal.      Pupils: Pupils are equal, round,  and reactive to light.   Cardiovascular:      Rate and Rhythm: Normal rate and regular rhythm.      Heart sounds: Normal heart sounds.   Pulmonary:      Effort: Pulmonary effort is normal.      Breath sounds: Normal breath sounds.   Abdominal:      General: Abdomen is flat. Bowel sounds are normal.      Palpations: There is no mass.      Tenderness: There is no abdominal tenderness. There is no right CVA tenderness, left CVA tenderness or guarding.   Skin:     Findings: No rash.   Neurological:      General: No focal deficit present.      Mental Status: She is oriented to person, place, and time.   Psychiatric:         Mood and Affect: Mood normal.         Behavior: Behavior normal.     PHQ-9 Total Score:      Assessment & Plan   Diagnoses and all orders for this visit:    1. Cough, unspecified type (Primary)  -     POCT SARS-CoV-2 Antigen EFREM  -     COVID-19,CEPHEID/KARYNA,COR/ABRAM/PAD/EMELY/MAD IN-HOUSE(OR EMERGENT/ADD-ON),NP SWAB IN TRANSPORT MEDIA 3-4 HR TAT, RT-PCR - Swab, Nasopharynx; Future  -     COVID-19,CEPHEID/KARYNA,COR/ABRAM/PAD/EMELY/MAD IN-HOUSE(OR EMERGENT/ADD-ON),NP SWAB IN TRANSPORT MEDIA 3-4 HR TAT, RT-PCR - Swab, Nasopharynx    2. Need for vaccination  -     Pneumococcal Conjugate Vaccine 20-Valent All  -     Cancel: Pneumococcal Conjugate Vaccine 20-Valent (PCV20)    3. Depression, unspecified depression type    4. Benign essential HTN    5. Type 2 diabetes mellitus with stage 2 chronic kidney disease, without long-term current use of insulin    6. Overweight with body mass index (BMI) of 29 to 29.9 in adult    Other orders  -     Cancel: Pneumococcal Conjugate Vaccine 20-Valent (PCV20)        Patient Instructions     Health Maintenance Due   Topic Date Due    Pneumococcal Vaccine 65+ (3 - PPSV23 or PCV20) 01/20/2023    COVID-19 Vaccine (6 - Moderna series) 03/15/2023    Check blood pressure cuff for accuracy and send 10 blood pressures over 2 weeks.  Watch sodium, alcohol and weight     Rest and fluids  and use flonase.  Will all tomorrow about Zpack

## 2023-08-29 NOTE — PATIENT INSTRUCTIONS
Health Maintenance Due   Topic Date Due    Pneumococcal Vaccine 65+ (3 - PPSV23 or PCV20) 01/20/2023    COVID-19 Vaccine (6 - Moderna series) 03/15/2023    Check blood pressure cuff for accuracy and send 10 blood pressures over 2 weeks.  Watch sodium, alcohol and weight     Rest and fluids and use flonase.  Will all tomorrow about Zpack

## 2023-08-30 NOTE — PROGRESS NOTES
Injection  Injection performed in left  Deltoid by Erika Silva MA. Patient tolerated the procedure well without complications.  08/29/2023   Erika Silva MA

## 2023-09-04 RX ORDER — DULAGLUTIDE 1.5 MG/.5ML
INJECTION, SOLUTION SUBCUTANEOUS
Qty: 4 ML | Refills: 1 | Status: SHIPPED | OUTPATIENT
Start: 2023-09-04

## 2023-09-11 ENCOUNTER — LAB (OUTPATIENT)
Dept: LAB | Facility: HOSPITAL | Age: 68
End: 2023-09-11
Payer: MEDICARE

## 2023-09-11 LAB
ABO GROUP BLD: NORMAL
ALBUMIN SERPL-MCNC: 4.9 G/DL (ref 3.5–5.2)
ALBUMIN/GLOB SERPL: 2.2 G/DL
ALP SERPL-CCNC: 101 U/L (ref 39–117)
ALT SERPL W P-5'-P-CCNC: 13 U/L (ref 1–33)
ANION GAP SERPL CALCULATED.3IONS-SCNC: 13 MMOL/L (ref 5–15)
AST SERPL-CCNC: 15 U/L (ref 1–32)
BASOPHILS # BLD AUTO: 0.04 10*3/MM3 (ref 0–0.2)
BASOPHILS NFR BLD AUTO: 0.7 % (ref 0–1.5)
BILIRUB SERPL-MCNC: 0.2 MG/DL (ref 0–1.2)
BILIRUB UR QL STRIP: NEGATIVE
BLD GP AB SCN SERPL QL: NEGATIVE
BUN SERPL-MCNC: 16 MG/DL (ref 8–23)
BUN/CREAT SERPL: 17.8 (ref 7–25)
CALCIUM SPEC-SCNC: 10 MG/DL (ref 8.6–10.5)
CHLORIDE SERPL-SCNC: 102 MMOL/L (ref 98–107)
CLARITY UR: CLEAR
CO2 SERPL-SCNC: 25 MMOL/L (ref 22–29)
COLOR UR: YELLOW
CREAT SERPL-MCNC: 0.9 MG/DL (ref 0.57–1)
DEPRECATED RDW RBC AUTO: 47.6 FL (ref 37–54)
EGFRCR SERPLBLD CKD-EPI 2021: 70.2 ML/MIN/1.73
EOSINOPHIL # BLD AUTO: 0.16 10*3/MM3 (ref 0–0.4)
EOSINOPHIL NFR BLD AUTO: 2.6 % (ref 0.3–6.2)
ERYTHROCYTE [DISTWIDTH] IN BLOOD BY AUTOMATED COUNT: 14.2 % (ref 12.3–15.4)
GLOBULIN UR ELPH-MCNC: 2.2 GM/DL
GLUCOSE SERPL-MCNC: 102 MG/DL (ref 65–99)
GLUCOSE UR STRIP-MCNC: NEGATIVE MG/DL
HBA1C MFR BLD: 5.9 % (ref 4.8–5.6)
HCT VFR BLD AUTO: 38 % (ref 34–46.6)
HGB BLD-MCNC: 12.7 G/DL (ref 12–15.9)
HGB UR QL STRIP.AUTO: NEGATIVE
IMM GRANULOCYTES # BLD AUTO: 0.02 10*3/MM3 (ref 0–0.05)
IMM GRANULOCYTES NFR BLD AUTO: 0.3 % (ref 0–0.5)
INR PPP: <0.93 (ref 0.93–1.1)
KETONES UR QL STRIP: NEGATIVE
LEUKOCYTE ESTERASE UR QL STRIP.AUTO: NEGATIVE
LYMPHOCYTES # BLD AUTO: 1.65 10*3/MM3 (ref 0.7–3.1)
LYMPHOCYTES NFR BLD AUTO: 27.3 % (ref 19.6–45.3)
MCH RBC QN AUTO: 30.5 PG (ref 26.6–33)
MCHC RBC AUTO-ENTMCNC: 33.4 G/DL (ref 31.5–35.7)
MCV RBC AUTO: 91.3 FL (ref 79–97)
MONOCYTES # BLD AUTO: 0.38 10*3/MM3 (ref 0.1–0.9)
MONOCYTES NFR BLD AUTO: 6.3 % (ref 5–12)
MRSA DNA SPEC QL NAA+PROBE: NORMAL
NEUTROPHILS NFR BLD AUTO: 3.79 10*3/MM3 (ref 1.7–7)
NEUTROPHILS NFR BLD AUTO: 62.8 % (ref 42.7–76)
NITRITE UR QL STRIP: NEGATIVE
NRBC BLD AUTO-RTO: 0.2 /100 WBC (ref 0–0.2)
PH UR STRIP.AUTO: 6.5 [PH] (ref 5–8)
PLATELET # BLD AUTO: 235 10*3/MM3 (ref 140–450)
PMV BLD AUTO: 10.3 FL (ref 6–12)
POTASSIUM SERPL-SCNC: 4.4 MMOL/L (ref 3.5–5.2)
PROT SERPL-MCNC: 7.1 G/DL (ref 6–8.5)
PROT UR QL STRIP: NEGATIVE
PROTHROMBIN TIME: 9.6 SECONDS (ref 9.6–11.7)
RBC # BLD AUTO: 4.16 10*6/MM3 (ref 3.77–5.28)
RH BLD: POSITIVE
SODIUM SERPL-SCNC: 140 MMOL/L (ref 136–145)
SP GR UR STRIP: 1.01 (ref 1–1.03)
T&S EXPIRATION DATE: NORMAL
UROBILINOGEN UR QL STRIP: NORMAL
WBC NRBC COR # BLD: 6.04 10*3/MM3 (ref 3.4–10.8)

## 2023-09-11 PROCEDURE — 86850 RBC ANTIBODY SCREEN: CPT

## 2023-09-11 PROCEDURE — 80053 COMPREHEN METABOLIC PANEL: CPT

## 2023-09-11 PROCEDURE — 87641 MR-STAPH DNA AMP PROBE: CPT

## 2023-09-11 PROCEDURE — 86900 BLOOD TYPING SEROLOGIC ABO: CPT

## 2023-09-11 PROCEDURE — 85025 COMPLETE CBC W/AUTO DIFF WBC: CPT | Performed by: ORTHOPAEDIC SURGERY

## 2023-09-11 PROCEDURE — 85610 PROTHROMBIN TIME: CPT

## 2023-09-11 PROCEDURE — 86901 BLOOD TYPING SEROLOGIC RH(D): CPT

## 2023-09-11 PROCEDURE — 81003 URINALYSIS AUTO W/O SCOPE: CPT

## 2023-09-11 PROCEDURE — 83036 HEMOGLOBIN GLYCOSYLATED A1C: CPT

## 2023-09-11 PROCEDURE — 36415 COLL VENOUS BLD VENIPUNCTURE: CPT | Performed by: ORTHOPAEDIC SURGERY

## 2023-09-13 ENCOUNTER — HOSPITAL ENCOUNTER (OUTPATIENT)
Dept: CARDIOLOGY | Facility: HOSPITAL | Age: 68
Discharge: HOME | End: 2023-09-13
Payer: MEDICARE

## 2023-09-13 ENCOUNTER — HOSPITAL ENCOUNTER (OUTPATIENT)
Dept: GENERAL RADIOLOGY | Facility: HOSPITAL | Age: 68
Discharge: HOME | End: 2023-09-13
Payer: MEDICARE

## 2023-09-13 LAB
QT INTERVAL: 375 MS
QTC INTERVAL: 425 MS

## 2023-09-13 PROCEDURE — 93005 ELECTROCARDIOGRAM TRACING: CPT | Performed by: ORTHOPAEDIC SURGERY

## 2023-09-13 PROCEDURE — 71046 X-RAY EXAM CHEST 2 VIEWS: CPT

## 2023-09-20 RX ORDER — GLIPIZIDE 5 MG/1
TABLET ORAL
Qty: 180 TABLET | Refills: 1 | Status: SHIPPED | OUTPATIENT
Start: 2023-09-20

## 2023-09-20 RX ORDER — GABAPENTIN 600 MG/1
600 TABLET ORAL NIGHTLY
Qty: 90 TABLET | Refills: 1 | Status: SHIPPED | OUTPATIENT
Start: 2023-09-20

## 2023-09-20 RX ORDER — ICOSAPENT ETHYL 1000 MG/1
CAPSULE ORAL
Qty: 120 CAPSULE | Refills: 0 | Status: SHIPPED | OUTPATIENT
Start: 2023-09-20

## 2023-09-20 NOTE — TELEPHONE ENCOUNTER
Rx Refill Note  Requested Prescriptions     Pending Prescriptions Disp Refills    glipizide (GLUCOTROL) 5 MG tablet [Pharmacy Med Name: GLIPIZIDE 5MG TABLETS] 180 tablet 1     Sig: TAKE 1 TABLET BY MOUTH TWICE DAILY    gabapentin (NEURONTIN) 600 MG tablet [Pharmacy Med Name: GABAPENTIN 600MG TABLETS] 180 tablet 0     Sig: TAKE 1 TABLET BY MOUTH TWICE DAILY    Vascepa 1 g capsule capsule [Pharmacy Med Name: VASCEPA 1GM CAPSULES] 120 capsule 0     Sig: TAKE 2 CAPSULES BY MOUTH TWICE DAILY WITH MEALS      Last office visit with prescribing clinician: 8/29/2023   Last telemedicine visit with prescribing clinician: Visit date not found   Next office visit with prescribing clinician: 12/1/2023                         Would you like a call back once the refill request has been completed: [] Yes [] No    If the office needs to give you a call back, can they leave a voicemail: [] Yes [] No    Rosa Maria Childs MA  09/20/23, 08:44 EDT

## 2023-09-21 ENCOUNTER — APPOINTMENT (OUTPATIENT)
Dept: GENERAL RADIOLOGY | Facility: HOSPITAL | Age: 68
End: 2023-09-21
Payer: MEDICARE

## 2023-09-21 ENCOUNTER — ANESTHESIA (OUTPATIENT)
Dept: PERIOP | Facility: HOSPITAL | Age: 68
End: 2023-09-21
Payer: MEDICARE

## 2023-09-21 ENCOUNTER — HOSPITAL ENCOUNTER (OUTPATIENT)
Facility: HOSPITAL | Age: 68
Discharge: HOME | End: 2023-09-22
Attending: ORTHOPAEDIC SURGERY | Admitting: ORTHOPAEDIC SURGERY
Payer: MEDICARE

## 2023-09-21 ENCOUNTER — ANESTHESIA EVENT (OUTPATIENT)
Dept: PERIOP | Facility: HOSPITAL | Age: 68
End: 2023-09-21
Payer: MEDICARE

## 2023-09-21 DIAGNOSIS — Z96.651 STATUS POST RIGHT KNEE REPLACEMENT: Primary | ICD-10-CM

## 2023-09-21 LAB
GLUCOSE BLDC GLUCOMTR-MCNC: 117 MG/DL (ref 70–105)
GLUCOSE BLDC GLUCOMTR-MCNC: 119 MG/DL (ref 70–105)
GLUCOSE BLDC GLUCOMTR-MCNC: 174 MG/DL (ref 70–105)

## 2023-09-21 PROCEDURE — 25010000002 MIDAZOLAM PER 1 MG: Performed by: ANESTHESIOLOGY

## 2023-09-21 PROCEDURE — 25010000002 CEFAZOLIN PER 500 MG: Performed by: ORTHOPAEDIC SURGERY

## 2023-09-21 PROCEDURE — G0378 HOSPITAL OBSERVATION PER HR: HCPCS

## 2023-09-21 PROCEDURE — C1776 JOINT DEVICE (IMPLANTABLE): HCPCS | Performed by: ORTHOPAEDIC SURGERY

## 2023-09-21 PROCEDURE — 82948 REAGENT STRIP/BLOOD GLUCOSE: CPT

## 2023-09-21 PROCEDURE — 25010000002 PROPOFOL 1000 MG/100ML EMULSION: Performed by: NURSE ANESTHETIST, CERTIFIED REGISTERED

## 2023-09-21 PROCEDURE — 25010000002 DEXAMETHASONE PER 1 MG: Performed by: NURSE ANESTHETIST, CERTIFIED REGISTERED

## 2023-09-21 PROCEDURE — 25010000002 FENTANYL CITRATE (PF) 100 MCG/2ML SOLUTION: Performed by: NURSE ANESTHETIST, CERTIFIED REGISTERED

## 2023-09-21 PROCEDURE — 25010000002 HYDROMORPHONE 1 MG/ML SOLUTION: Performed by: NURSE ANESTHETIST, CERTIFIED REGISTERED

## 2023-09-21 PROCEDURE — 25010000002 VANCOMYCIN 1 G RECONSTITUTED SOLUTION: Performed by: ORTHOPAEDIC SURGERY

## 2023-09-21 PROCEDURE — 25010000002 ONDANSETRON PER 1 MG: Performed by: NURSE ANESTHETIST, CERTIFIED REGISTERED

## 2023-09-21 PROCEDURE — 63710000001 GABAPENTIN 600 MG TABLET: Performed by: ORTHOPAEDIC SURGERY

## 2023-09-21 PROCEDURE — 25010000002 MAGNESIUM SULFATE PER 500 MG OF MAGNESIUM: Performed by: NURSE ANESTHETIST, CERTIFIED REGISTERED

## 2023-09-21 PROCEDURE — 63710000001 METOPROLOL TARTRATE 50 MG TABLET: Performed by: ORTHOPAEDIC SURGERY

## 2023-09-21 PROCEDURE — A9270 NON-COVERED ITEM OR SERVICE: HCPCS | Performed by: ORTHOPAEDIC SURGERY

## 2023-09-21 PROCEDURE — 73560 X-RAY EXAM OF KNEE 1 OR 2: CPT

## 2023-09-21 PROCEDURE — 25010000002 ROPIVACAINE PER 1 MG: Performed by: ANESTHESIOLOGY

## 2023-09-21 DEVICE — DEV WND/CLS CONTRL TISS STRATAFIX SYMM PDS PLS CTX 60CM VIL: Type: IMPLANTABLE DEVICE | Site: KNEE | Status: FUNCTIONAL

## 2023-09-21 DEVICE — DEV CONTRL TISS STRATAFIX SPIRAL MNCRYL UD 3/0 PLS 30CM: Type: IMPLANTABLE DEVICE | Site: KNEE | Status: FUNCTIONAL

## 2023-09-21 DEVICE — JOURNEY II BCS XLPE ARTICULAR                                    INSERT SIZE 1-2 RIGHT 15MM
Type: IMPLANTABLE DEVICE | Site: KNEE | Status: FUNCTIONAL
Brand: JOURNEY

## 2023-09-21 RX ORDER — PROPOFOL 10 MG/ML
INJECTION, EMULSION INTRAVENOUS AS NEEDED
Status: DISCONTINUED | OUTPATIENT
Start: 2023-09-21 | End: 2023-09-21 | Stop reason: SURG

## 2023-09-21 RX ORDER — DOCUSATE SODIUM 100 MG/1
100 CAPSULE, LIQUID FILLED ORAL 2 TIMES DAILY PRN
Status: DISCONTINUED | OUTPATIENT
Start: 2023-09-21 | End: 2023-09-22 | Stop reason: HOSPADM

## 2023-09-21 RX ORDER — OXYCODONE HYDROCHLORIDE 5 MG/1
10 TABLET ORAL EVERY 4 HOURS PRN
Status: DISCONTINUED | OUTPATIENT
Start: 2023-09-21 | End: 2023-09-22 | Stop reason: HOSPADM

## 2023-09-21 RX ORDER — ACETAMINOPHEN 500 MG
1000 TABLET ORAL ONCE
Status: COMPLETED | OUTPATIENT
Start: 2023-09-21 | End: 2023-09-21

## 2023-09-21 RX ORDER — ONDANSETRON 2 MG/ML
4 INJECTION INTRAMUSCULAR; INTRAVENOUS EVERY 6 HOURS PRN
Status: DISCONTINUED | OUTPATIENT
Start: 2023-09-21 | End: 2023-09-22 | Stop reason: HOSPADM

## 2023-09-21 RX ORDER — ROPIVACAINE HYDROCHLORIDE 5 MG/ML
INJECTION, SOLUTION EPIDURAL; INFILTRATION; PERINEURAL
Status: COMPLETED | OUTPATIENT
Start: 2023-09-21 | End: 2023-09-21

## 2023-09-21 RX ORDER — LABETALOL HYDROCHLORIDE 5 MG/ML
5 INJECTION, SOLUTION INTRAVENOUS
Status: DISCONTINUED | OUTPATIENT
Start: 2023-09-21 | End: 2023-09-21 | Stop reason: HOSPADM

## 2023-09-21 RX ORDER — FLUOXETINE HYDROCHLORIDE 20 MG/1
20 CAPSULE ORAL DAILY
Status: DISCONTINUED | OUTPATIENT
Start: 2023-09-22 | End: 2023-09-22 | Stop reason: HOSPADM

## 2023-09-21 RX ORDER — EPHEDRINE SULFATE 5 MG/ML
5 INJECTION INTRAVENOUS ONCE AS NEEDED
Status: DISCONTINUED | OUTPATIENT
Start: 2023-09-21 | End: 2023-09-21 | Stop reason: HOSPADM

## 2023-09-21 RX ORDER — SODIUM CHLORIDE, SODIUM LACTATE, POTASSIUM CHLORIDE, CALCIUM CHLORIDE 600; 310; 30; 20 MG/100ML; MG/100ML; MG/100ML; MG/100ML
9 INJECTION, SOLUTION INTRAVENOUS CONTINUOUS PRN
Status: CANCELLED | OUTPATIENT
Start: 2023-09-21

## 2023-09-21 RX ORDER — METOPROLOL TARTRATE 50 MG/1
100 TABLET, FILM COATED ORAL 2 TIMES DAILY
Status: DISCONTINUED | OUTPATIENT
Start: 2023-09-21 | End: 2023-09-22 | Stop reason: HOSPADM

## 2023-09-21 RX ORDER — HYDRALAZINE HYDROCHLORIDE 20 MG/ML
5 INJECTION INTRAMUSCULAR; INTRAVENOUS
Status: DISCONTINUED | OUTPATIENT
Start: 2023-09-21 | End: 2023-09-21 | Stop reason: HOSPADM

## 2023-09-21 RX ORDER — ONDANSETRON 2 MG/ML
4 INJECTION INTRAMUSCULAR; INTRAVENOUS ONCE AS NEEDED
Status: DISCONTINUED | OUTPATIENT
Start: 2023-09-21 | End: 2023-09-21 | Stop reason: HOSPADM

## 2023-09-21 RX ORDER — DEXAMETHASONE SODIUM PHOSPHATE 4 MG/ML
INJECTION, SOLUTION INTRA-ARTICULAR; INTRALESIONAL; INTRAMUSCULAR; INTRAVENOUS; SOFT TISSUE AS NEEDED
Status: DISCONTINUED | OUTPATIENT
Start: 2023-09-21 | End: 2023-09-21 | Stop reason: SURG

## 2023-09-21 RX ORDER — MELOXICAM 15 MG/1
15 TABLET ORAL DAILY
Status: DISCONTINUED | OUTPATIENT
Start: 2023-09-22 | End: 2023-09-22 | Stop reason: HOSPADM

## 2023-09-21 RX ORDER — LIDOCAINE HYDROCHLORIDE 20 MG/ML
INJECTION, SOLUTION EPIDURAL; INFILTRATION; INTRACAUDAL; PERINEURAL AS NEEDED
Status: DISCONTINUED | OUTPATIENT
Start: 2023-09-21 | End: 2023-09-21 | Stop reason: SURG

## 2023-09-21 RX ORDER — OXYCODONE HYDROCHLORIDE 5 MG/1
10 TABLET ORAL EVERY 4 HOURS PRN
Status: DISCONTINUED | OUTPATIENT
Start: 2023-09-21 | End: 2023-09-21 | Stop reason: HOSPADM

## 2023-09-21 RX ORDER — SODIUM CHLORIDE 0.9 % (FLUSH) 0.9 %
10 SYRINGE (ML) INJECTION AS NEEDED
Status: DISCONTINUED | OUTPATIENT
Start: 2023-09-21 | End: 2023-09-21 | Stop reason: HOSPADM

## 2023-09-21 RX ORDER — TRANEXAMIC ACID 10 MG/ML
1000 INJECTION, SOLUTION INTRAVENOUS ONCE
Status: COMPLETED | OUTPATIENT
Start: 2023-09-21 | End: 2023-09-21

## 2023-09-21 RX ORDER — OXYCODONE HYDROCHLORIDE 5 MG/1
5 TABLET ORAL EVERY 4 HOURS PRN
Status: DISCONTINUED | OUTPATIENT
Start: 2023-09-21 | End: 2023-09-22 | Stop reason: HOSPADM

## 2023-09-21 RX ORDER — DIPHENHYDRAMINE HYDROCHLORIDE 50 MG/ML
12.5 INJECTION INTRAMUSCULAR; INTRAVENOUS
Status: DISCONTINUED | OUTPATIENT
Start: 2023-09-21 | End: 2023-09-21 | Stop reason: HOSPADM

## 2023-09-21 RX ORDER — NALOXONE HCL 0.4 MG/ML
0.1 VIAL (ML) INJECTION
Status: DISCONTINUED | OUTPATIENT
Start: 2023-09-21 | End: 2023-09-22 | Stop reason: HOSPADM

## 2023-09-21 RX ORDER — OXYCODONE HYDROCHLORIDE 5 MG/1
5 TABLET ORAL ONCE AS NEEDED
Status: DISCONTINUED | OUTPATIENT
Start: 2023-09-21 | End: 2023-09-21 | Stop reason: HOSPADM

## 2023-09-21 RX ORDER — MIDAZOLAM HYDROCHLORIDE 1 MG/ML
INJECTION INTRAMUSCULAR; INTRAVENOUS AS NEEDED
Status: DISCONTINUED | OUTPATIENT
Start: 2023-09-21 | End: 2023-09-21 | Stop reason: SURG

## 2023-09-21 RX ORDER — NALOXONE HCL 0.4 MG/ML
0.4 VIAL (ML) INJECTION AS NEEDED
Status: DISCONTINUED | OUTPATIENT
Start: 2023-09-21 | End: 2023-09-21 | Stop reason: HOSPADM

## 2023-09-21 RX ORDER — OXYCODONE HCL 10 MG/1
10 TABLET, FILM COATED, EXTENDED RELEASE ORAL ONCE
Status: COMPLETED | OUTPATIENT
Start: 2023-09-21 | End: 2023-09-21

## 2023-09-21 RX ORDER — AMLODIPINE BESYLATE 5 MG/1
5 TABLET ORAL DAILY
Status: DISCONTINUED | OUTPATIENT
Start: 2023-09-22 | End: 2023-09-22 | Stop reason: HOSPADM

## 2023-09-21 RX ORDER — ASPIRIN 81 MG/1
81 TABLET ORAL EVERY 12 HOURS SCHEDULED
Status: DISCONTINUED | OUTPATIENT
Start: 2023-09-22 | End: 2023-09-22 | Stop reason: HOSPADM

## 2023-09-21 RX ORDER — IPRATROPIUM BROMIDE AND ALBUTEROL SULFATE 2.5; .5 MG/3ML; MG/3ML
3 SOLUTION RESPIRATORY (INHALATION) ONCE AS NEEDED
Status: DISCONTINUED | OUTPATIENT
Start: 2023-09-21 | End: 2023-09-21 | Stop reason: HOSPADM

## 2023-09-21 RX ORDER — FAMOTIDINE 10 MG/ML
INJECTION, SOLUTION INTRAVENOUS AS NEEDED
Status: DISCONTINUED | OUTPATIENT
Start: 2023-09-21 | End: 2023-09-21 | Stop reason: SURG

## 2023-09-21 RX ORDER — SODIUM CHLORIDE 9 MG/ML
10 INJECTION, SOLUTION INTRAVENOUS ONCE
Status: COMPLETED | OUTPATIENT
Start: 2023-09-21 | End: 2023-09-21

## 2023-09-21 RX ORDER — DIPHENHYDRAMINE HYDROCHLORIDE 50 MG/ML
12.5 INJECTION INTRAMUSCULAR; INTRAVENOUS ONCE AS NEEDED
Status: DISCONTINUED | OUTPATIENT
Start: 2023-09-21 | End: 2023-09-21 | Stop reason: HOSPADM

## 2023-09-21 RX ORDER — SODIUM CHLORIDE, SODIUM LACTATE, POTASSIUM CHLORIDE, CALCIUM CHLORIDE 600; 310; 30; 20 MG/100ML; MG/100ML; MG/100ML; MG/100ML
INJECTION, SOLUTION INTRAVENOUS CONTINUOUS PRN
Status: DISCONTINUED | OUTPATIENT
Start: 2023-09-21 | End: 2023-09-21 | Stop reason: SURG

## 2023-09-21 RX ORDER — ONDANSETRON 4 MG/1
1 TABLET, FILM COATED ORAL EVERY 8 HOURS PRN
COMMUNITY
End: 2023-09-24

## 2023-09-21 RX ORDER — ONDANSETRON 4 MG/1
4 TABLET, FILM COATED ORAL EVERY 6 HOURS PRN
Status: DISCONTINUED | OUTPATIENT
Start: 2023-09-21 | End: 2023-09-22 | Stop reason: HOSPADM

## 2023-09-21 RX ORDER — SODIUM CHLORIDE 0.9 % (FLUSH) 0.9 %
10 SYRINGE (ML) INJECTION EVERY 12 HOURS SCHEDULED
Status: DISCONTINUED | OUTPATIENT
Start: 2023-09-21 | End: 2023-09-21 | Stop reason: HOSPADM

## 2023-09-21 RX ORDER — DEXMEDETOMIDINE HYDROCHLORIDE 100 UG/ML
INJECTION, SOLUTION INTRAVENOUS AS NEEDED
Status: DISCONTINUED | OUTPATIENT
Start: 2023-09-21 | End: 2023-09-21 | Stop reason: SURG

## 2023-09-21 RX ORDER — ONDANSETRON 2 MG/ML
INJECTION INTRAMUSCULAR; INTRAVENOUS AS NEEDED
Status: DISCONTINUED | OUTPATIENT
Start: 2023-09-21 | End: 2023-09-21 | Stop reason: SURG

## 2023-09-21 RX ORDER — TRANEXAMIC ACID 10 MG/ML
1000 INJECTION, SOLUTION INTRAVENOUS ONCE
Status: DISCONTINUED | OUTPATIENT
Start: 2023-09-21 | End: 2023-09-21 | Stop reason: HOSPADM

## 2023-09-21 RX ORDER — VANCOMYCIN HYDROCHLORIDE 1 G/20ML
INJECTION, POWDER, LYOPHILIZED, FOR SOLUTION INTRAVENOUS AS NEEDED
Status: DISCONTINUED | OUTPATIENT
Start: 2023-09-21 | End: 2023-09-21 | Stop reason: HOSPADM

## 2023-09-21 RX ORDER — GABAPENTIN 600 MG/1
600 TABLET ORAL NIGHTLY
Status: DISCONTINUED | OUTPATIENT
Start: 2023-09-21 | End: 2023-09-22 | Stop reason: HOSPADM

## 2023-09-21 RX ORDER — MAGNESIUM SULFATE HEPTAHYDRATE 500 MG/ML
INJECTION, SOLUTION INTRAMUSCULAR; INTRAVENOUS AS NEEDED
Status: DISCONTINUED | OUTPATIENT
Start: 2023-09-21 | End: 2023-09-21 | Stop reason: SURG

## 2023-09-21 RX ORDER — FENTANYL CITRATE 50 UG/ML
INJECTION, SOLUTION INTRAMUSCULAR; INTRAVENOUS AS NEEDED
Status: DISCONTINUED | OUTPATIENT
Start: 2023-09-21 | End: 2023-09-21 | Stop reason: SURG

## 2023-09-21 RX ORDER — FUROSEMIDE 40 MG/1
40 TABLET ORAL DAILY
Status: DISCONTINUED | OUTPATIENT
Start: 2023-09-22 | End: 2023-09-22 | Stop reason: HOSPADM

## 2023-09-21 RX ORDER — SODIUM CHLORIDE 9 MG/ML
100 INJECTION, SOLUTION INTRAVENOUS CONTINUOUS
Status: DISCONTINUED | OUTPATIENT
Start: 2023-09-21 | End: 2023-09-22 | Stop reason: HOSPADM

## 2023-09-21 RX ADMIN — PROPOFOL INJECTABLE EMULSION 150 MCG/KG/MIN: 10 INJECTION, EMULSION INTRAVENOUS at 14:33

## 2023-09-21 RX ADMIN — CEFAZOLIN 2 G: 2 INJECTION, POWDER, FOR SOLUTION INTRAMUSCULAR; INTRAVENOUS at 23:07

## 2023-09-21 RX ADMIN — METOPROLOL TARTRATE 50 MG: 50 TABLET ORAL at 23:07

## 2023-09-21 RX ADMIN — ACETAMINOPHEN 1000 MG: 500 TABLET, FILM COATED ORAL at 13:46

## 2023-09-21 RX ADMIN — LIDOCAINE HYDROCHLORIDE 60 MG: 20 INJECTION, SOLUTION EPIDURAL; INFILTRATION; INTRACAUDAL; PERINEURAL at 14:29

## 2023-09-21 RX ADMIN — DEXMEDETOMIDINE HYDROCHLORIDE 4 MCG: 100 INJECTION, SOLUTION INTRAVENOUS at 14:51

## 2023-09-21 RX ADMIN — FENTANYL CITRATE 50 MCG: 50 INJECTION, SOLUTION INTRAMUSCULAR; INTRAVENOUS at 15:02

## 2023-09-21 RX ADMIN — MAGNESIUM SULFATE HEPTAHYDRATE 0.4 G: 500 INJECTION, SOLUTION INTRAMUSCULAR; INTRAVENOUS at 14:55

## 2023-09-21 RX ADMIN — MAGNESIUM SULFATE HEPTAHYDRATE 0.4 G: 500 INJECTION, SOLUTION INTRAMUSCULAR; INTRAVENOUS at 14:51

## 2023-09-21 RX ADMIN — DEXMEDETOMIDINE HYDROCHLORIDE 4 MCG: 100 INJECTION, SOLUTION INTRAVENOUS at 15:01

## 2023-09-21 RX ADMIN — DEXMEDETOMIDINE HYDROCHLORIDE 40 MCG: 100 INJECTION, SOLUTION INTRAVENOUS at 13:53

## 2023-09-21 RX ADMIN — HYDROMORPHONE HYDROCHLORIDE 1 MG: 1 INJECTION, SOLUTION INTRAMUSCULAR; INTRAVENOUS; SUBCUTANEOUS at 15:31

## 2023-09-21 RX ADMIN — GABAPENTIN 600 MG: 600 TABLET, FILM COATED ORAL at 23:07

## 2023-09-21 RX ADMIN — CEFAZOLIN 2000 MG: 2 INJECTION, POWDER, FOR SOLUTION INTRAMUSCULAR; INTRAVENOUS at 14:33

## 2023-09-21 RX ADMIN — MAGNESIUM SULFATE HEPTAHYDRATE 0.4 G: 500 INJECTION, SOLUTION INTRAMUSCULAR; INTRAVENOUS at 14:48

## 2023-09-21 RX ADMIN — MAGNESIUM SULFATE HEPTAHYDRATE 0.4 G: 500 INJECTION, SOLUTION INTRAMUSCULAR; INTRAVENOUS at 14:41

## 2023-09-21 RX ADMIN — FENTANYL CITRATE 50 MCG: 50 INJECTION, SOLUTION INTRAMUSCULAR; INTRAVENOUS at 14:44

## 2023-09-21 RX ADMIN — FAMOTIDINE 20 MG: 10 INJECTION INTRAVENOUS at 14:21

## 2023-09-21 RX ADMIN — SODIUM CHLORIDE, SODIUM LACTATE, POTASSIUM CHLORIDE, AND CALCIUM CHLORIDE: .6; .31; .03; .02 INJECTION, SOLUTION INTRAVENOUS at 14:25

## 2023-09-21 RX ADMIN — MAGNESIUM SULFATE HEPTAHYDRATE 0.4 G: 500 INJECTION, SOLUTION INTRAMUSCULAR; INTRAVENOUS at 14:43

## 2023-09-21 RX ADMIN — PROPOFOL INJECTABLE EMULSION 150 MG: 10 INJECTION, EMULSION INTRAVENOUS at 14:29

## 2023-09-21 RX ADMIN — OXYCODONE HYDROCHLORIDE 10 MG: 10 TABLET, FILM COATED, EXTENDED RELEASE ORAL at 13:46

## 2023-09-21 RX ADMIN — MIDAZOLAM 2 MG: 1 INJECTION INTRAMUSCULAR; INTRAVENOUS at 13:49

## 2023-09-21 RX ADMIN — HYDROMORPHONE HYDROCHLORIDE 0.5 MG: 1 INJECTION, SOLUTION INTRAMUSCULAR; INTRAVENOUS; SUBCUTANEOUS at 16:30

## 2023-09-21 RX ADMIN — TRANEXAMIC ACID 1000 MG: 10 INJECTION, SOLUTION INTRAVENOUS at 14:35

## 2023-09-21 RX ADMIN — ROPIVACAINE HYDROCHLORIDE 30 ML: 5 INJECTION EPIDURAL; INFILTRATION; PERINEURAL at 13:53

## 2023-09-21 RX ADMIN — SODIUM CHLORIDE 10 ML/HR: 9 INJECTION, SOLUTION INTRAVENOUS at 13:30

## 2023-09-21 RX ADMIN — ONDANSETRON 4 MG: 2 INJECTION INTRAMUSCULAR; INTRAVENOUS at 15:19

## 2023-09-21 RX ADMIN — DEXAMETHASONE SODIUM PHOSPHATE 8 MG: 4 INJECTION, SOLUTION INTRAMUSCULAR; INTRAVENOUS at 14:41

## 2023-09-21 RX ADMIN — HYDROMORPHONE HYDROCHLORIDE 0.5 MG: 1 INJECTION, SOLUTION INTRAMUSCULAR; INTRAVENOUS; SUBCUTANEOUS at 15:51

## 2023-09-21 NOTE — OP NOTE
Total Knee Revision Operative Note  Dr. MARCIA Anderson II  [phone]    PATIENT NAME: Odalis Solo  MRN: [id number]  : 1955 AGE: 68 y.o. GENDER: female  DATE OF OPERATION: 2023  PREOPERATIVE DIAGNOSIS:  Capsular laxity  POSTOPERATIVE DIAGNOSIS: Same  OPERATION PERFORMED: Right Total Knee Arthroplasty Revision  SURGEON: Davey Anderson MD  Circulator: Vika White RN; Mindy Deshpande RN  Scrub Person: Dory Loredo  Vendor Representative: Car Davenport  Assistant: Ata Gonsalves CSA  ANESTHESIA: General  ASSISTANT:  Ata Gonsalves. This case would not have been possible without another set of skilled surgical hands for retraction, bone reduction, use of instrumentation, assistance with implants, and closure.  This assistance was vital to the success of the case.   ESTIMATED BLOOD LOSS: 50cc  SPONGE AND NEEDLE COUNT: Correct  INDICATIONS:  This patient had a previous total knee replacement with subsequent capsular laxity  A discussion of operative versus nonoperative treatment was had with the patient and they failed conservative management. They elected to undergo total knee arthroplasty revision. The risks of surgery were discussed and included the risk of anesthesia, infection, damage to neurovascular structures, implant loosening/failure, fracture, hardware prominence, continued pain, early failure, the need for further procedures, medical complications, and others. No guarantees were made. The patient wished to proceed with surgery and a surgical consent was signed.    COMPONENTS:   15 mm BCS articular insert    PERTINENT FINDINGS: Capsular laxity    DETAILS OF PROCEDURE:  The patient was met in the preoperative area. The site was marked. The consent and H&P were reviewed. The patient was then wheeled back to the operative suite and transferred to the operative table. The patient underwent anesthesia. A tourniquet was placed on the upper thigh.  A bump was placed under the operative hip.  "The Rasheed baseplate was secured to the table. Surgical alcohol was used to thoroughly clean the entire operative extremity.     The leg was then prepped in the normal sterile fashion, which included Chloroprep, multiple layers of sterile drapes, and surgical space suits for the entire operative team. The Rasheed boot was applied to the foot after adequate padding. An Ioban dressing was applied to the knee after the surgical incision was marked.  New outer gloves were used by all sterile surgical team members after final draping. After a surgical timeout in which administration of preoperative antibiotics as well as 1g of tranexamic acid (if not contraindicated) and the surgical site were confirmed, the tourniquet was put up.     In flexion a midline incision was utilized using the old scar.  Dissection was carried down to the capsule where a medial parapatellar approach was utilized.  The excess scar tissue and synovium about the knee were excised and then the knee was tested.  It was noted to have gross capsular laxity with instability in both flexion and extension.  The polyethylene was then extracted and multiple polyethylene trials were then utilized until there was adequate stability but still good range of motion.  Ended up inserting a 15 mm BCS polyethylene insert.  After insertion of the poly, the knee was tested 1 final time and noted to have excellent range of motion and great stability.  The tourniquet was taken down and adequate hemostasis was achieved.  The knee was thoroughly irrigated and injected with an analgesic cocktail.  The knee was then closed in layers and a sterile dressing was applied.     The patient was awoken from anesthesia, moved to the [facility] and taken to the recovery room in stable condition. Sponge and needle count were correct. There were no complications. Patient tolerated the procedure well.    R \"John\" Justin MUNROE MD  Orthopaedic Surgery  [facility] " [facility]  [phone]

## 2023-09-21 NOTE — ANESTHESIA PROCEDURE NOTES
Airway  Urgency: elective    Date/Time: 9/21/2023 2:31 PM  End Time:9/21/2023 1:33 PM  Airway not difficult    General Information and Staff    Patient location during procedure: OR  CRNA/CAA: Yelitza Arriaga CRNA    Indications and Patient Condition  Indications for airway management: airway protection    Preoxygenated: yes  MILS maintained throughout  Mask difficulty assessment: 1 - vent by mask    Final Airway Details  Final airway type: supraglottic airway      Successful airway: I-gel  Size 3     Assessment: lips, teeth, and gum same as pre-op and atraumatic intubation

## 2023-09-21 NOTE — ANESTHESIA POSTPROCEDURE EVALUATION
Patient: Odalis Solo    Procedure Summary       Date: 09/21/23 Room / Location: [facility] OR 06 / [facility] MAIN OR    Anesthesia Start: 1425 Anesthesia Stop: 1519    Procedure: REVISION POLY EXCHANGE TOTAL KNEE ARTHROPLASTY WITH CORI ROBOT (Right: Knee) Diagnosis:       Osteoarthritis of right knee      (Osteoarthritis of right knee [M17.11])    Surgeons: Davey Anderson II, MD Provider: Rodrigo Church MD    Anesthesia Type: ERAS Protocol, general with block ASA Status: 3            Anesthesia Type: ERAS Protocol, general with block    Vitals  Vitals Value Taken Time   /61 09/21/23 1604   Temp 97.6 °F (36.4 °C) 09/21/23 1518   Pulse 78 09/21/23 1606   Resp 8 09/21/23 1603   SpO2 91 % 09/21/23 1606   Vitals shown include unvalidated device data.        Post Anesthesia Care and Evaluation    Patient location during evaluation: PACU  Patient participation: complete - patient participated  Level of consciousness: awake  Pain scale: See nurse's notes for pain score.  Pain management: adequate    Airway patency: patent  Anesthetic complications: No anesthetic complications  PONV Status: none  Cardiovascular status: acceptable  Respiratory status: acceptable and spontaneous ventilation  Hydration status: acceptable    Comments: Patient seen and examined postoperatively; vital signs stable; SpO2 greater than or equal to 90%; cardiopulmonary status stable; nausea/vomiting adequately controlled; pain adequately controlled; no apparent anesthesia complications; patient discharged from anesthesia care when discharge criteria were met

## 2023-09-21 NOTE — ANESTHESIA PROCEDURE NOTES
Peripheral Block    Pre-sedation assessment completed: 9/21/2023 1:00 PM    Patient reassessed immediately prior to procedure    Patient location during procedure: pre-op  Reason for block: procedure for pain, at surgeon's request, post-op pain management and secondary anesthetic  Performed by  Anesthesiologist: Rodrigo Church MD  Preanesthetic Checklist  Completed: patient identified, IV checked, site marked, risks and benefits discussed, surgical consent, monitors and equipment checked, pre-op evaluation and timeout performed  Prep:  Pt Position: sitting  Sterile barriers:cap, gloves, mask and washed/disinfected hands  Prep: ChloraPrep  Patient monitoring: blood pressure monitoring, continuous pulse oximetry and EKG  Procedure    Sedation: yes  Performed under: local infiltration  Guidance:ultrasound guided and landmark technique    ULTRASOUND INTERPRETATION.  Using ultrasound guidance a 20 G gauge needle was placed in close proximity to the femoral nerve, at which point, under ultrasound guidance anesthetic was injected in the area of the nerve and spread of the anesthesia was seen on ultrasound in close proximity thereto.  There were no abnormalities seen on ultrasound; a digital image was taken; and the patient tolerated the procedure with no complications. Images:still images obtained, printed/placed on chart    Laterality:right  Block Type:adductor canal block  Injection Technique:single-shot  Needle Type:echogenic  Needle Gauge:20 G  Resistance on Injection: less than 15 psi    Medications Used: ropivacaine (NAROPIN) 0.5 % injection - Perineural   30 mL - 9/21/2023 1:53:00 PM      Post Assessment  Injection Assessment: negative aspiration for heme, no paresthesia on injection and incremental injection  Patient Tolerance:comfortable throughout block  Complications:no  Additional Notes  Pre-procedure:  Peripheral nerve block performed preoperatively prior to the start of anesthesia time at the request  of the patient and the surgeon for the management of postoperative acute surgical pain as well as for a secondary intraoperative anesthetic (general anesthesia is the primary intraoperative anesthetic); patient identified; pre-procedure vital signs, all relevant labs/studies, complete medical/surgical/anesthetic history, full medication list, full allergy list, and NPO status obtained/reviewed; physical assessment performed; anesthetic options, side effects, potential complications, risks, and benefits discussed; questions answered; patient wishes to proceed with the procedure; written anesthesia procedure consent obtained; patient cleared for procedure; time out performed; IV access in situ    Procedure:  ASA monitor placed; supplemental oxygen provided; patient positioned; hand hygiene performed; sterile technique maintained throughout the procedure; sterile prep applied; insertion site determined by anatomical landmarks, palpation, and ultrasound imaging; live ultrasound guidance throughout the procedure; target nerves/landmarks identified on live ultrasound; skin and subcutaneous tissues numbed by injection of 1% lidocaine; 4 inch 20G Sorbisense Ultra 360 Insulated Echogenic Needle used; realtime needle advancement and placement near the target nerves witnessed on live ultrasound; negative aspiration prior to injection; correct needle placement confirmed on live ultrasound by local anesthetic spread around the target nerves; local anesthetic mixture injected with negative aspiration prior to each injection and after each 1-5 mL injected; needle withdrawn; dressing applied; ultrasound image printed and placed in the patient's permanent medical record    Post-procedure:  Peripheral nerve block placed successfully; good block; no apparent complications; minimal estimated blood loss; vital signs stable throughout; see nurse's notes for vitals; transported to the OR, general anesthesia induced, and surgery  started

## 2023-09-21 NOTE — H&P
Orthopaedic Surgery  History & Physical  Dr. MARCIA Anderson II  [phone]    HPI:  Patient is a 68 y.o. Not  or  female who presents with right knee capsular laxity    MEDICAL HISTORY  Past Medical History:   Diagnosis Date    Anemia     hx    Anxiety     Chiari syndrome     hx    Chronic renal failure (CRF), stage 3 (moderate)     DDD (degenerative disc disease), lumbar     Depression     Diabetes mellitus     Frequent headaches     History of narcotic addiction     Hyperlipidemia     Hypertension     Low back pain     MRSA infection     Neuropathy     head    Peripheral edema     Sciatic pain, right     Seasonal allergies     Stroke 2011    mini    Suspected COVID-19 virus infection 2021    Urine frequency 2021     Past Surgical History:   Procedure Laterality Date    ANTERIOR CERVICAL DISCECTOMY W/ FUSION N/A 2019    Procedure: CERVICAL DISCECTOMY ANTERIOR WITH FUSION, C4-6;  Surgeon: Ian Foley MD;  Location: [facility] MAIN OR;  Service: Neurosurgery    APPENDECTOMY      BRAIN SURGERY      total 8    CARPAL TUNNEL RELEASE      CERVICAL CHIARI DECOMPRESSION POSTERIOR      x 5 from frontal parietal x  2     SECTION      CHOLECYSTECTOMY      lap    HIP SURGERY Left 2017    total    HYSTERECTOMY      SHOULDER ARTHROSCOPY W/ ROTATOR CUFF REPAIR Right 09/15/2020    Procedure: SHOULDER ARTHROSCOPY WITH  EXTENSIVE DEBRIDEMENT,  SUBACROMINAL DECOMPRESSION, ROTATOR CUFF REPAIR and bicep tenodesis;  Surgeon: Silviano Gong MD;  Location: [facility] MAIN OR;  Service: Orthopedics;  Laterality: Right;    SHOULDER SURGERY Right 09/15/2020    scope/cuff repair    TONSILLECTOMY      TOTAL KNEE ARTHROPLASTY Right 2023    Procedure: TOTAL KNEE ARTHROPLASTY WITH CORI ROBOT;  Surgeon: Davey Anderson II, MD;  Location: [facility] MAIN OR;  Service: Robotics - Ortho;  Laterality: Right;     SHUNT INSERTION  10/2022     Prior to Admission medications     Medication Sig Start Date End Date Taking? Authorizing Provider   amLODIPine (NORVASC) 5 MG tablet Take 1 tablet by mouth Daily. Indications: High Blood Pressure Disorder   Yes Gil Lemos MD   fenofibrate 160 MG tablet TAKE 1 TABLET BY MOUTH EVERY DAY  Patient taking differently: Take 1 tablet by mouth Every Evening. 7/31/23  Yes Aubree Alas MD   FLUoxetine (PROzac) 20 MG capsule Take 1 capsule by mouth Daily. 8/8/23  Yes Aubree Alas MD   furosemide (LASIX) 40 MG tablet Take 1 tablet by mouth Daily. Indications: High Blood Pressure Disorder 9/30/15  Yes Gil Lemos MD   losartan (Cozaar) 50 MG tablet Take 1 tablet by mouth 2 (Two) Times a Day. 9/22/22  Yes Aubree Alas MD   metoprolol tartrate (LOPRESSOR) 100 MG tablet TAKE 1 TABLET BY MOUTH TWICE DAILY.  Patient taking differently: Take 1 tablet by mouth 2 (Two) Times a Day. Indications: High Blood Pressure Disorder 3/24/23  Yes Aubree Alas MD   Praluent 75 MG/ML solution auto-injector INJECT 1 ML INTO THE SKIN IN THE APPROPRIATE AREA AS DIRECTED EVERY 14 DAYS.  Patient taking differently: Inject 1 mL as directed Every 14 (Fourteen) Days. 6/18/23  Yes Aubree Alas MD   Trulicity 1.5 MG/0.5ML solution pen-injector ADMINISTER 1.5 MG UNDER THE SKIN 1 TIME EVERY WEEK AS DIRECTED  Patient taking differently: Inject 1.5 mg under the skin into the appropriate area as directed 1 (One) Time Per Week. Fri 9/4/23  Yes Aubree Alas MD   aspirin 81 MG EC tablet Take 1 tablet by mouth Daily.  Patient not taking: Reported on 9/8/2023 8/26/22   Gil Lemos MD   gabapentin (NEURONTIN) 600 MG tablet Take 1 tablet by mouth Every Night. Indications: Diabetes with Nerve Disease 9/20/23   Aubree Alas MD   glipizide (GLUCOTROL) 5 MG tablet TAKE 1 TABLET BY MOUTH TWICE DAILY 9/20/23   Aubree Alas MD   Vascepa 1 g capsule capsule TAKE 2 CAPSULES BY MOUTH TWICE DAILY WITH MEALS  "23   Aubree Alas MD     Allergies   Allergen Reactions    Sulfa Antibiotics Hives     Most Recent Immunizations   Administered Date(s) Administered    COVID-19 (MODERNA) 1st,2nd,3rd Dose Monovalent 2021    COVID-19 (MODERNA) BIVALENT 12+YRS 11/15/2022    COVID-19 (MODERNA) Monovalent Original Booster 2022    Flu Vaccine Intradermal Quad 18-64YR 10/01/2017    Fluad Quad 65+ 10/04/2021    Fluzone (or Fluarix & Flulaval for VFC) >6mos 10/05/2020    Fluzone High-Dose 65+yrs 10/03/2022    Hepatitis A 10/31/2018    Influenza TIV (IM) 10/01/2019    Influenza, Unspecified 2022    Pneumococcal Conjugate 13-Valent (PCV13) 2022    Pneumococcal Conjugate 20-Valent (PCV20) 2023    Pneumococcal Polysaccharide (PPSV23) 2018    Shingrix 2021    Td (TDVAX) 2018    Tdap 2018     Social History     Tobacco Use    Smoking status: Former     Packs/day: 0.25     Years: 10.00     Pack years: 2.50     Types: Cigarettes     Quit date: 1997     Years since quittin.0    Smokeless tobacco: Never   Substance Use Topics    Alcohol use: Yes     Comment: 2 times a month      Social History     Substance and Sexual Activity   Drug Use Not Currently    Types: Hydrocodone    Comment: hx addiction       REVIEW OF SYSTEMS:  Head: negative for headache  Respiratory: negative for shortness of breath.   Cardiovascular: negative for chest pain.   Gastrointestinal: negative abdominal pain.   Neurological: negative for LOC  Psychiatric/Behavioral: negative for memory loss.   All other systems reviewed and are negative    VITALS: /84 (BP Location: Left arm, Patient Position: Lying)   Pulse 75   Temp 99.5 °F (37.5 °C) (Oral)   Resp 14   Ht 152.4 cm (60\")   Wt 67.9 kg (149 lb 9.6 oz)   SpO2 98%   BMI 29.22 kg/m²  Body mass index is 29.22 kg/m².    PHYSICAL EXAM:   CONSTITUTIONAL: A&Ox3, No acute distress  LUNGS: Equal chest rise, no shortness of air  CARDIOVASCULAR: " "palpable peripheral pulses  SKIN: no skin lesions in the area examined  LYMPH: no lymphadenopathy in the area examined      RADIOLOGY REVIEW:   XR Chest PA & Lateral    Result Date: 9/13/2023  Impression: No acute cardiopulmonary findings. Electronically Signed: Elena Sotelo MD  9/13/2023 3:16 PM EDT  Workstation ID: [id number]     LABS:   Results for the past 24 hours: No results found for this or any previous visit (from the past 24 hour(s)).    IMPRESSION:  Patient is a 68 y.o. Not  or  female with right knee capsular laxity    PLAN:   Admited to: Davey Anderson II, MD    Disposition: Right total knee polyethylene revision    R \"John\" Justin MUNROE MD  Orthopaedic Surgery  [facility]  [phone] - [place] [facility]  [phone] - [place] Office    "

## 2023-09-21 NOTE — ANESTHESIA PREPROCEDURE EVALUATION
Anesthesia Evaluation     Patient summary reviewed and Nursing notes reviewed   no history of anesthetic complications:   NPO Solid Status: > 8 hours  NPO Liquid Status: > 2 hours           Airway   Dental      Pulmonary    (+) a smoker Former,  Cardiovascular     ECG reviewed  PT is on anticoagulation therapy  Patient on routine beta blocker    (+) hypertension, MONGE, hyperlipidemia      Neuro/Psych  (+) CVA, headaches, weakness, numbness, psychiatric history Anxiety and Depression  GI/Hepatic/Renal/Endo    (+) obesity, GERD, GI bleeding , renal disease CRI, diabetes mellitus    Musculoskeletal     (+) back pain, chronic pain, gait problem, myalgias, neck pain, radiculopathy  Abdominal    Substance History      OB/GYN          Other   arthritis,     ROS/Med Hx Other: Additional History:  Chiari malformation, chest tightness, sciatica, abd pain, nausea, IBS, dysphagia, diverticulitis, edema, neuropathy    PSH:  BRAIN SURGERY  SECTION  APPENDECTOMY CARPAL TUNNEL RELEASE  HIP SURGERY TONSILLECTOMY  HYSTERECTOMY ANTERIOR CERVICAL DISCECTOMY W/ FUSION  SHOULDER SURGERY SHOULDER ARTHROSCOPY W/ ROTATOR CUFF REPAIR  CERVICAL CHIARI DECOMPRESSION POSTERIOR  SHUNT INSERTION  CHOLECYSTECTOMY                   Anesthesia Plan    ASA 3     ERAS Protocol and general with block   total IV anesthesia  Reason for not using neuraxial anesthesia or peripheral nerve block: Surgeon Refused  (Patient identified; pre-operative vital signs, all relevant labs/studies, complete medical/surgical/anesthetic history, full medication list, full allergy list, and NPO status obtained/reviewed; physical assessment performed; anesthetic options, side effects, potential complications, risks, and benefits discussed; questions answered; written anesthesia consent obtained; patient cleared for procedure; anesthesia machine and equipment checked and functioning)  intravenous induction     Anesthetic plan, risks, benefits, and alternatives have  been provided, discussed and informed consent has been obtained with: patient.    Plan discussed with CRNA and CAA.      CODE STATUS:

## 2023-09-21 NOTE — DISCHARGE PLACEMENT REQUEST
"Alejandra Nesbitt (68 y.o. Female)       Date of Birth   1955    Social Security Number       Address   [address] [place] [address] [place] [facility]    Home Phone       MRN   [id number]       Latter-day   Latter day    Marital Status                               Admission Date       Admission Type   Elective    Admitting Provider   Davey Anderson II, MD    Attending Provider   Davey Anderson II, MD    Department, Room/Bed   [facility] MAIN OR, --/--       Discharge Date       Discharge Disposition       Discharge Destination                                 Attending Provider: Davey Anderson II, MD    Allergies: Sulfa Antibiotics    Isolation: None   Infection: None   Code Status: CPR    Ht: 152.4 cm (60\")   Wt: 65.8 kg (145 lb)    Admission Cmt: None   Principal Problem: None                  Active Insurance as of 9/21/2023       Primary Coverage       Payor Plan Insurance Group Employer/Plan Group    ANTHEM MEDICARE REPLACEMENT ANTHEM MEDICARE ADVANTAGE [id number]       Payor Plan Address Payor Plan Phone Number Payor Plan Fax Number Effective Dates    [address] [phone]  3/1/2023 - None Entered    [facility] [address]         Subscriber Name Subscriber Birth Date Member ID       ALEJANDRA NESBITT 1955 [id number]                     Emergency Contacts        (Rel.) Home Phone Work Phone Mobile Phone    MARTIN MCKAY (Sister) -- -- [phone]    MYRIAMABDULKADIRSY (Sister) -- -- [phone]                "

## 2023-09-22 ENCOUNTER — DOCUMENTATION (OUTPATIENT)
Dept: HOME HEALTH SERVICES | Facility: HOME HEALTHCARE | Age: 68
End: 2023-09-22
Payer: MEDICARE

## 2023-09-22 ENCOUNTER — HOME HEALTH ADMISSION (OUTPATIENT)
Dept: HOME HEALTH SERVICES | Facility: HOME HEALTHCARE | Age: 68
End: 2023-09-22
Payer: COMMERCIAL

## 2023-09-22 ENCOUNTER — TRANSITIONAL CARE MANAGEMENT TELEPHONE ENCOUNTER (OUTPATIENT)
Dept: FAMILY MEDICINE CLINIC | Facility: CLINIC | Age: 68
End: 2023-09-22
Payer: MEDICARE

## 2023-09-22 VITALS
SYSTOLIC BLOOD PRESSURE: 131 MMHG | HEART RATE: 75 BPM | TEMPERATURE: 98.6 F | DIASTOLIC BLOOD PRESSURE: 77 MMHG | WEIGHT: 149.6 LBS | OXYGEN SATURATION: 93 % | RESPIRATION RATE: 19 BRPM | BODY MASS INDEX: 29.37 KG/M2 | HEIGHT: 60 IN

## 2023-09-22 LAB
ANION GAP SERPL CALCULATED.3IONS-SCNC: 11 MMOL/L (ref 5–15)
BUN SERPL-MCNC: 15 MG/DL (ref 8–23)
BUN/CREAT SERPL: 12.6 (ref 7–25)
CALCIUM SPEC-SCNC: 8.9 MG/DL (ref 8.6–10.5)
CHLORIDE SERPL-SCNC: 107 MMOL/L (ref 98–107)
CO2 SERPL-SCNC: 21 MMOL/L (ref 22–29)
CREAT SERPL-MCNC: 1.19 MG/DL (ref 0.57–1)
DEPRECATED RDW RBC AUTO: 47.3 FL (ref 37–54)
EGFRCR SERPLBLD CKD-EPI 2021: 50.2 ML/MIN/1.73
ERYTHROCYTE [DISTWIDTH] IN BLOOD BY AUTOMATED COUNT: 14.5 % (ref 12.3–15.4)
GLUCOSE SERPL-MCNC: 202 MG/DL (ref 65–99)
HCT VFR BLD AUTO: 31.5 % (ref 34–46.6)
HGB BLD-MCNC: 10.4 G/DL (ref 12–15.9)
MCH RBC QN AUTO: 30.7 PG (ref 26.6–33)
MCHC RBC AUTO-ENTMCNC: 33 G/DL (ref 31.5–35.7)
MCV RBC AUTO: 92.9 FL (ref 79–97)
PLATELET # BLD AUTO: 190 10*3/MM3 (ref 140–450)
PMV BLD AUTO: 8.4 FL (ref 6–12)
POTASSIUM SERPL-SCNC: 4.7 MMOL/L (ref 3.5–5.2)
RBC # BLD AUTO: 3.39 10*6/MM3 (ref 3.77–5.28)
SODIUM SERPL-SCNC: 139 MMOL/L (ref 136–145)
WBC NRBC COR # BLD: 6.6 10*3/MM3 (ref 3.4–10.8)

## 2023-09-22 PROCEDURE — 97161 PT EVAL LOW COMPLEX 20 MIN: CPT

## 2023-09-22 PROCEDURE — 63710000001 AMLODIPINE 5 MG TABLET: Performed by: ORTHOPAEDIC SURGERY

## 2023-09-22 PROCEDURE — 63710000001 OXYCODONE 5 MG TABLET: Performed by: ORTHOPAEDIC SURGERY

## 2023-09-22 PROCEDURE — 63710000001 FLUOXETINE 20 MG CAPSULE: Performed by: ORTHOPAEDIC SURGERY

## 2023-09-22 PROCEDURE — A9270 NON-COVERED ITEM OR SERVICE: HCPCS | Performed by: ORTHOPAEDIC SURGERY

## 2023-09-22 PROCEDURE — 85027 COMPLETE CBC AUTOMATED: CPT | Performed by: ORTHOPAEDIC SURGERY

## 2023-09-22 PROCEDURE — 63710000001 MELOXICAM 15 MG TABLET: Performed by: ORTHOPAEDIC SURGERY

## 2023-09-22 PROCEDURE — 63710000001 ASPIRIN 81 MG TABLET DELAYED-RELEASE: Performed by: ORTHOPAEDIC SURGERY

## 2023-09-22 PROCEDURE — 80048 BASIC METABOLIC PNL TOTAL CA: CPT | Performed by: ORTHOPAEDIC SURGERY

## 2023-09-22 PROCEDURE — 63710000001 METOPROLOL TARTRATE 50 MG TABLET: Performed by: ORTHOPAEDIC SURGERY

## 2023-09-22 PROCEDURE — 63710000001 FUROSEMIDE 40 MG TABLET: Performed by: ORTHOPAEDIC SURGERY

## 2023-09-22 PROCEDURE — 25010000002 CEFAZOLIN PER 500 MG: Performed by: ORTHOPAEDIC SURGERY

## 2023-09-22 RX ORDER — OXYCODONE HYDROCHLORIDE AND ACETAMINOPHEN 5; 325 MG/1; MG/1
1 TABLET ORAL EVERY 4 HOURS PRN
Qty: 50 TABLET | Refills: 0 | Status: SHIPPED | OUTPATIENT
Start: 2023-09-22

## 2023-09-22 RX ORDER — ONDANSETRON 4 MG/1
4 TABLET, FILM COATED ORAL EVERY 6 HOURS PRN
Qty: 30 TABLET | Refills: 0 | Status: SHIPPED | OUTPATIENT
Start: 2023-09-22

## 2023-09-22 RX ORDER — ASPIRIN 81 MG/1
81 TABLET ORAL EVERY 12 HOURS
Qty: 60 TABLET | Refills: 0 | Status: SHIPPED | OUTPATIENT
Start: 2023-09-22 | End: 2023-10-22

## 2023-09-22 RX ADMIN — OXYCODONE HYDROCHLORIDE 10 MG: 5 TABLET ORAL at 06:27

## 2023-09-22 RX ADMIN — OXYCODONE HYDROCHLORIDE 10 MG: 5 TABLET ORAL at 12:58

## 2023-09-22 RX ADMIN — CEFAZOLIN 2 G: 2 INJECTION, POWDER, FOR SOLUTION INTRAMUSCULAR; INTRAVENOUS at 06:16

## 2023-09-22 RX ADMIN — METOPROLOL TARTRATE 100 MG: 50 TABLET ORAL at 08:10

## 2023-09-22 RX ADMIN — AMLODIPINE BESYLATE 5 MG: 5 TABLET ORAL at 08:10

## 2023-09-22 RX ADMIN — OXYCODONE HYDROCHLORIDE 5 MG: 5 TABLET ORAL at 04:01

## 2023-09-22 RX ADMIN — SODIUM CHLORIDE 100 ML/HR: 9 INJECTION, SOLUTION INTRAVENOUS at 01:08

## 2023-09-22 RX ADMIN — MELOXICAM 15 MG: 15 TABLET ORAL at 08:10

## 2023-09-22 RX ADMIN — FLUOXETINE 20 MG: 20 CAPSULE ORAL at 08:11

## 2023-09-22 RX ADMIN — ASPIRIN 81 MG: 81 TABLET, COATED ORAL at 08:10

## 2023-09-22 RX ADMIN — FUROSEMIDE 40 MG: 40 TABLET ORAL at 08:11

## 2023-09-22 NOTE — PLAN OF CARE
Goal Outcome Evaluation:  Plan of Care Reviewed With: patient           Outcome Evaluation: Odalis Solo is a 68 y.o. female who underwent R TKA revision by Dr. Anderson on 9/21/23 and is seen by PT on POD #1.  Pt lives alone at baseline but son will stay with her overnight for the next day or so. She reports that her neighbors are also available to assist.  She has no ALIRIO and was ambulating with a SPC prior to admission.  At time of evaluation, she is A&O x 4 and denies knee pain.  PT reviewed and provided written HEP and pt is able to complete all exercises after initial cues. Pt demonstrates independence with bed mobility and transfers and ambulated 60' with RW mod Independently. She would benefit from HH PT at discharge.      Anticipated Discharge Disposition (PT): home with home health

## 2023-09-22 NOTE — DISCHARGE PLACEMENT REQUEST
"Alejandra Nesbitt (68 y.o. Female)       Date of Birth   1955    Social Security Number       Address   [address] [place] [address]  [address] [place] [address] [place] [address]    Home Phone   [phone]    MRN   [id number]       Anabaptism   Taoist    Marital Status                               Admission Date   9/21/23    Admission Type   Elective    Admitting Provider   Davey Anderson II, MD    Attending Provider   Davey Anderson II, MD    Department, Room/Bed   [facility] SURGICAL INPATIENT, 4126/1       Discharge Date       Discharge Disposition   Home or Self Care    Discharge Destination                                 Attending Provider: Davey Anderson II, MD    Allergies: Sulfa Antibiotics    Isolation: None   Infection: None   Code Status: CPR    Ht: 152.4 cm (60\")   Wt: 67.9 kg (149 lb 9.6 oz)    Admission Cmt: None   Principal Problem: Status post knee replacement [Z96.659]                   Active Insurance as of 9/21/2023       Primary Coverage       Payor Plan Insurance Group Employer/Plan Group    ANTHEM MEDICARE REPLACEMENT ANTHEM MEDICARE ADVANTAGE [id number]       Payor Plan Address Payor Plan Phone Number Payor Plan Fax Number Effective Dates    [address] [phone]  3/1/2023 - None Entered    [facility] [address]         Subscriber Name Subscriber Birth Date Member ID       ALEJANDRA NESBITT 1955 [id number]               Secondary Coverage       Payor Plan Insurance Group Employer/Plan Group    [place]        Payor Plan Address Payor Plan Phone Number Payor Plan Fax Number Effective Dates    [address]   1/1/2019 - None Entered    [place] [address]         Subscriber Name Subscriber Birth Date Member ID       ALEJANDRA NESBITT 1955 [id number]                     Emergency Contacts        (Rel.) Home Phone Work Phone Mobile Phone    MARTIN MCKAY (Sister) -- -- [phone]    MYRIAM, " SHANICE (Sister) -- -- [phone]                 History & Physical        Davey Anderson II, MD at 23 1320            Orthopaedic Surgery  History & Physical  Dr. KENNEDY “Joao Anderson II  [phone]    HPI:  Patient is a 68 y.o. Not  or  female who presents with right knee capsular laxity    MEDICAL HISTORY  Past Medical History:   Diagnosis Date    Anemia     hx    Anxiety     Chiari syndrome     hx    Chronic renal failure (CRF), stage 3 (moderate)     DDD (degenerative disc disease), lumbar     Depression     Diabetes mellitus     Frequent headaches     History of narcotic addiction     Hyperlipidemia     Hypertension     Low back pain     MRSA infection     Neuropathy     head    Peripheral edema     Sciatic pain, right     Seasonal allergies     Stroke 2011    mini    Suspected COVID-19 virus infection 2021    Urine frequency 2021     Past Surgical History:   Procedure Laterality Date    ANTERIOR CERVICAL DISCECTOMY W/ FUSION N/A 2019    Procedure: CERVICAL DISCECTOMY ANTERIOR WITH FUSION, C4-6;  Surgeon: Ian Foley MD;  Location: [facility];  Service: Neurosurgery    APPENDECTOMY      BRAIN SURGERY      total 8    CARPAL TUNNEL RELEASE      CERVICAL CHIARI DECOMPRESSION POSTERIOR      x 5 from frontal parietal x  2     SECTION      CHOLECYSTECTOMY      lap    HIP SURGERY Left 2017    total    HYSTERECTOMY      SHOULDER ARTHROSCOPY W/ ROTATOR CUFF REPAIR Right 09/15/2020    Procedure: SHOULDER ARTHROSCOPY WITH  EXTENSIVE DEBRIDEMENT,  SUBACROMINAL DECOMPRESSION, ROTATOR CUFF REPAIR and bicep tenodesis;  Surgeon: Silviano Gong MD;  Location: [facility];  Service: Orthopedics;  Laterality: Right;    SHOULDER SURGERY Right 09/15/2020    scope/cuff repair    TONSILLECTOMY      TOTAL KNEE ARTHROPLASTY Right 2023    Procedure: TOTAL KNEE ARTHROPLASTY WITH CORI ROBOT;  Surgeon: Davey Anderson II, MD;  Location:   [facility] MAIN OR;  Service: Robotics - Ortho;  Laterality: Right;     SHUNT INSERTION  10/2022     Prior to Admission medications    Medication Sig Start Date End Date Taking? Authorizing Provider   amLODIPine (NORVASC) 5 MG tablet Take 1 tablet by mouth Daily. Indications: High Blood Pressure Disorder   Yes Gil Lemos MD   fenofibrate 160 MG tablet TAKE 1 TABLET BY MOUTH EVERY DAY  Patient taking differently: Take 1 tablet by mouth Every Evening. 7/31/23  Yes Aubree Alas MD   FLUoxetine (PROzac) 20 MG capsule Take 1 capsule by mouth Daily. 8/8/23  Yes Aubree Alas MD   furosemide (LASIX) 40 MG tablet Take 1 tablet by mouth Daily. Indications: High Blood Pressure Disorder 9/30/15  Yes Gil Lemos MD   losartan (Cozaar) 50 MG tablet Take 1 tablet by mouth 2 (Two) Times a Day. 9/22/22  Yes Aubree Alas MD   metoprolol tartrate (LOPRESSOR) 100 MG tablet TAKE 1 TABLET BY MOUTH TWICE DAILY.  Patient taking differently: Take 1 tablet by mouth 2 (Two) Times a Day. Indications: High Blood Pressure Disorder 3/24/23  Yes Aubree Alas MD   Praluent 75 MG/ML solution auto-injector INJECT 1 ML INTO THE SKIN IN THE APPROPRIATE AREA AS DIRECTED EVERY 14 DAYS.  Patient taking differently: Inject 1 mL as directed Every 14 (Fourteen) Days. 6/18/23  Yes Aubree Alas MD   Trulicity 1.5 MG/0.5ML solution pen-injector ADMINISTER 1.5 MG UNDER THE SKIN 1 TIME EVERY WEEK AS DIRECTED  Patient taking differently: Inject 1.5 mg under the skin into the appropriate area as directed 1 (One) Time Per Week. Fri 9/4/23  Yes Aubree Alas MD   aspirin 81 MG EC tablet Take 1 tablet by mouth Daily.  Patient not taking: Reported on 9/8/2023 8/26/22   Gil Lemos MD   gabapentin (NEURONTIN) 600 MG tablet Take 1 tablet by mouth Every Night. Indications: Diabetes with Nerve Disease 9/20/23   Aubree Alas MD   glipizide (GLUCOTROL) 5 MG tablet TAKE 1 TABLET BY MOUTH  "TWICE DAILY 23   Aubree Alas MD   Vascepa 1 g capsule capsule TAKE 2 CAPSULES BY MOUTH TWICE DAILY WITH MEALS 23   Aubree Alas MD     Allergies   Allergen Reactions    Sulfa Antibiotics Hives     Most Recent Immunizations   Administered Date(s) Administered    COVID-19 (MODERNA) 1st,2nd,3rd Dose Monovalent 2021    COVID-19 (MODERNA) BIVALENT 12+YRS 11/15/2022    COVID-19 (MODERNA) Monovalent Original Booster 2022    Flu Vaccine Intradermal Quad 18-64YR 10/01/2017    Fluad Quad 65+ 10/04/2021    Fluzone (or Fluarix & Flulaval for VFC) >6mos 10/05/2020    Fluzone High-Dose 65+yrs 10/03/2022    Hepatitis A 10/31/2018    Influenza TIV (IM) 10/01/2019    Influenza, Unspecified 2022    Pneumococcal Conjugate 13-Valent (PCV13) 2022    Pneumococcal Conjugate 20-Valent (PCV20) 2023    Pneumococcal Polysaccharide (PPSV23) 2018    Shingrix 2021    Td (TDVAX) 2018    Tdap 2018     Social History     Tobacco Use    Smoking status: Former     Packs/day: 0.25     Years: 10.00     Pack years: 2.50     Types: Cigarettes     Quit date: 1997     Years since quittin.0    Smokeless tobacco: Never   Substance Use Topics    Alcohol use: Yes     Comment: 2 times a month      Social History     Substance and Sexual Activity   Drug Use Not Currently    Types: Hydrocodone    Comment: hx addiction       REVIEW OF SYSTEMS:  Head: negative for headache  Respiratory: negative for shortness of breath.   Cardiovascular: negative for chest pain.   Gastrointestinal: negative abdominal pain.   Neurological: negative for LOC  Psychiatric/Behavioral: negative for memory loss.   All other systems reviewed and are negative    VITALS: /84 (BP Location: Left arm, Patient Position: Lying)   Pulse 75   Temp 99.5 °F (37.5 °C) (Oral)   Resp 14   Ht 152.4 cm (60\")   Wt 67.9 kg (149 lb 9.6 oz)   SpO2 98%   BMI 29.22 kg/m²  Body mass index is 29.22 " "kg/m².    PHYSICAL EXAM:   CONSTITUTIONAL: A&Ox3, No acute distress  LUNGS: Equal chest rise, no shortness of air  CARDIOVASCULAR: palpable peripheral pulses  SKIN: no skin lesions in the area examined  LYMPH: no lymphadenopathy in the area examined      RADIOLOGY REVIEW:   XR Chest PA & Lateral    Result Date: 9/13/2023  Impression: No acute cardiopulmonary findings. Electronically Signed: Elena Sotelo MD  9/13/2023 3:16 PM EDT  Workstation ID: [id number]     LABS:   Results for the past 24 hours: No results found for this or any previous visit (from the past 24 hour(s)).    IMPRESSION:  Patient is a 68 y.o. Not  or  female with right knee capsular laxity    PLAN:   Admited to: Davey Anderson II, MD    Disposition: Right total knee polyethylene revision    R \"John\" Justin MUNROE MD  Orthopaedic Surgery  [facility]  [phone] - [place] Office  [phone] - [place] Office      Electronically signed by Davey Anderson II, MD at 09/21/23 1321       "

## 2023-09-22 NOTE — PROGRESS NOTES
Verified demographics with patient. Pt is agreeable to services and has no other agency    PT    DR.Richard Anderson will be the following provider    PCP: Dr. Madison  Pharmacy: [facility] in [place]

## 2023-09-22 NOTE — PLAN OF CARE
Goal Outcome Evaluation:      Patient alert and able to make needs known. Pain controlled with ice packs and per MAR.  in use. Call light within reach, plan of care ongoing.

## 2023-09-22 NOTE — THERAPY EVALUATION
Patient Name: Odalis Solo  : 1955    MRN: [id number]                              Today's Date: 2023       Admit Date: 2023    Visit Dx:     ICD-10-CM ICD-9-CM   1. 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    DDD (degenerative disc disease), cervical    Family history of diabetes mellitus    Hyperlipidemia    Leg length discrepancy    Status post hip replacement    History of stroke    Tear of acetabular labrum    Type 2 diabetes mellitus    Lumbar radiculopathy    Cervical radiculopathy    Cervical spondylosis    Complete tear of right rotator cuff    Urine frequency    Suspected COVID-19 virus infection    Ataxic gait    Chest tightness    Chiari malformation    MONGE (dyspnea on exertion)    Hypermetropia    Lumbago with sciatica    Migraine with aura    Myalgia    Nuclear sclerosis    Presbyopia    Arthrodesis status    Anxiety    Depression    GERD (gastroesophageal reflux disease)    Headache    Weakness    Chronic pain of right knee    Overweight with body mass index (BMI) of 29 to 29.9 in adult    Transient cerebral ischemia    Cyst of right ovary    Anemia, unspecified    Diarrhea    Diverticulitis    Dysphagia    High blood pressure    Irritable bowel syndrome    Lower abdominal pain, unspecified    Nausea    Rectal bleeding    Chronic low back pain    Status post knee replacement    Other constipation     Past Medical History:   Diagnosis Date    Anemia     hx    Anxiety     Chiari syndrome     hx    Chronic renal failure (CRF), stage 3 (moderate)     DDD (degenerative disc disease), lumbar     Depression     Diabetes mellitus     Frequent headaches     History of narcotic addiction     Hyperlipidemia     Hypertension     Low back pain     MRSA  infection     Neuropathy     head    Peripheral edema     Sciatic pain, right     Seasonal allergies     Stroke 2011    mini    Suspected COVID-19 virus infection 2021    Urine frequency 2021     Past Surgical History:   Procedure Laterality Date    ANTERIOR CERVICAL DISCECTOMY W/ FUSION N/A 2019    Procedure: CERVICAL DISCECTOMY ANTERIOR WITH FUSION, C4-6;  Surgeon: Ian Foley MD;  Location: [facility];  Service: Neurosurgery    APPENDECTOMY      BRAIN SURGERY      total 8    CARPAL TUNNEL RELEASE      CERVICAL CHIARI DECOMPRESSION POSTERIOR      x 5 from frontal parietal x  2     SECTION      CHOLECYSTECTOMY      lap    HIP SURGERY Left 2017    total    HYSTERECTOMY      SHOULDER ARTHROSCOPY W/ ROTATOR CUFF REPAIR Right 09/15/2020    Procedure: SHOULDER ARTHROSCOPY WITH  EXTENSIVE DEBRIDEMENT,  SUBACROMINAL DECOMPRESSION, ROTATOR CUFF REPAIR and bicep tenodesis;  Surgeon: Silviano Gong MD;  Location: [facility] OR;  Service: Orthopedics;  Laterality: Right;    SHOULDER SURGERY Right 09/15/2020    scope/cuff repair    TONSILLECTOMY      TOTAL KNEE ARTHROPLASTY Right 2023    Procedure: TOTAL KNEE ARTHROPLASTY WITH CORI ROBOT;  Surgeon: Davey Anderson II, MD;  Location: [facility] OR;  Service: Robotics - Ortho;  Laterality: Right;     SHUNT INSERTION  10/2022      General Information       Row Name 23 1247          Physical Therapy Time and Intention    Document Type evaluation  -CL     Mode of Treatment individual therapy;physical therapy  -CL       Row Name 23 1247          General Information    Patient Profile Reviewed yes  -CL     Prior Level of Function independent:;all household mobility;ADL's;driving  -CL     Existing Precautions/Restrictions weight bearing;fall  WBAT R  -CL     Barriers to Rehab none identified  -CL       Row Name 23 1247          Living Environment    People in Home alone  -CL       Row Name 23  1247          Home Main Entrance    Number of Stairs, Main Entrance none  -CL       Row Name 09/22/23 1247          Stairs Within Home, Primary    Number of Stairs, Within Home, Primary none  -CL       Row Name 09/22/23 1247          Cognition    Orientation Status (Cognition) oriented x 4  -CL               User Key  (r) = Recorded By, (t) = Taken By, (c) = Cosigned By      Initials Name Provider Type    CL Leilani Purcell, PT Physical Therapist                   Mobility       Row Name 09/22/23 1247          Bed Mobility    Bed Mobility bed mobility (all) activities  -CL     All Activities, Riley (Bed Mobility) independent  -CL       Row Name 09/22/23 1247          Bed-Chair Transfer    Bed-Chair Riley (Transfers) modified independence  -CL       Row Name 09/22/23 1247          Sit-Stand Transfer    Sit-Stand Riley (Transfers) modified independence  -CL     Comment, (Sit-Stand Transfer) --  -CL       Row Name 09/22/23 1247          Gait/Stairs (Locomotion)    Riley Level (Gait) modified independence  -CL     Assistive Device (Gait) walker, front-wheeled  -CL     Distance in Feet (Gait) 60'  -CL       Row Name 09/22/23 1247          Mobility    Extremity Weight-bearing Status right lower extremity  -CL     Right Lower Extremity (Weight-bearing Status) weight-bearing as tolerated (WBAT)  -CL               User Key  (r) = Recorded By, (t) = Taken By, (c) = Cosigned By      Initials Name Provider Type    CL Leilani Purcell PT Physical Therapist                   Obj/Interventions       Row Name 09/22/23 1248          Range of Motion Comprehensive    Comment, General Range of Motion R knee 0-70 degrees  -CL       Row Name 09/22/23 1248          Strength Comprehensive (MMT)    General Manual Muscle Testing (MMT) Assessment no strength deficits identified  -CL       Row Name 09/22/23 1248          Balance    Balance Assessment sitting static balance;sitting dynamic balance;standing  static balance;standing dynamic balance  -CL     Static Sitting Balance independent  -CL     Dynamic Sitting Balance independent  -CL     Position, Sitting Balance sitting edge of bed  -CL     Static Standing Balance modified independence  -CL     Dynamic Standing Balance modified independence  -CL     Position/Device Used, Standing Balance supported;walker, front-wheeled  -CL       Row Name 09/22/23 1248          Sensory Assessment (Somatosensory)    Sensory Assessment (Somatosensory) sensation intact  -CL               User Key  (r) = Recorded By, (t) = Taken By, (c) = Cosigned By      Initials Name Provider Type    CL Leilani Purcell, PT Physical Therapist                   Goals/Plan       Row Name 09/22/23 1253          Gait Training Goal 1 (PT)    Activity/Assistive Device (Gait Training Goal 1, PT) gait (walking locomotion);assistive device use  -CL     [place] Level (Gait Training Goal 1, PT) modified independence  -CL     Distance (Gait Training Goal 1, PT) 100'  -CL     Time Frame (Gait Training Goal 1, PT) long term goal (LTG);1 week  -CL       Row Name 09/22/23 1253          ROM Goal 1 (PT)    ROM Goal 1 (PT) R knee 0-90 degrees  -CL     Time Frame (ROM Goal 1, PT) long-term goal (LTG);2 weeks  -CL       Row Name 09/22/23 1253          Therapy Assessment/Plan (PT)    Planned Therapy Interventions (PT) home exercise program;patient/family education;ROM (range of motion);gait training  -CL               User Key  (r) = Recorded By, (t) = Taken By, (c) = Cosigned By      Initials Name Provider Type    CL Leilani Purcell, PT Physical Therapist                   Clinical Impression       Row Name 09/22/23 1249          Pain    Pretreatment Pain Rating 0/10 - no pain  -CL     Posttreatment Pain Rating 0/10 - no pain  -CL       Row Name 09/22/23 1249          Plan of Care Review    Plan of Care Reviewed With patient  -CL     Outcome Evaluation Odalis Solo is a 68 y.o. female who underwent R  TKA revision by Dr. Anderson on 9/21/23 and is seen by PT on POD #1.  Pt lives alone at baseline but son will stay with her overnight for the next day or so. She reports that her neighbors are also available to assist.  She has no ALIRIO and was ambulating with a SPC prior to admission.  At time of evaluation, she is A&O x 4 and denies knee pain.  PT reviewed and provided written HEP and pt is able to complete all exercises after initial cues. Pt demonstrates independence with bed mobility and transfers and ambulated 60' with RW mod Independently. She would benefit from HH PT at discharge.  -CL       Row Name 09/22/23 1249          Therapy Assessment/Plan (PT)    Rehab Potential (PT) good, to achieve stated therapy goals  -CL     Criteria for Skilled Interventions Met (PT) yes;skilled treatment is necessary  -CL     Therapy Frequency (PT) 3 times/wk  -CL     Predicted Duration of Therapy Intervention (PT) Until discharge  -CL       Row Name 09/22/23 1249          Positioning and Restraints    Pre-Treatment Position in bed  -CL     Post Treatment Position chair  -CL     In Chair notified nsg;reclined;call light within reach  -CL               User Key  (r) = Recorded By, (t) = Taken By, (c) = Cosigned By      Initials Name Provider Type    Leilani Burger, PT Physical Therapist                   Outcome Measures       Row Name 09/22/23 1253 09/22/23 0800       How much help from another person do you currently need...    Turning from your back to your side while in flat bed without using bedrails? 4  -CL 4  -AH    Moving from lying on back to sitting on the side of a flat bed without bedrails? 4  -CL 4  -AH    Moving to and from a bed to a chair (including a wheelchair)? 4  -CL 3  -AH    Standing up from a chair using your arms (e.g., wheelchair, bedside chair)? 4  -CL 3  -AH    Climbing 3-5 steps with a railing? 3  -CL 3  -AH    To walk in hospital room? 4  -CL 3  -AH    AM-PAC 6 Clicks Score (PT) 23  -CL 20  -AH     Highest level of mobility 7 --> Walked 25 feet or more  - 6 --> Walked 10 steps or more  -              User Key  (r) = Recorded By, (t) = Taken By, (c) = Cosigned By      Initials Name Provider Type     Johanny Lazaro LPN Licensed Nurse     Leilani Purcell PT Physical Therapist                                 Physical Therapy Education       Title: PT OT SLP Therapies (Done)       Topic: Physical Therapy (Done)       Point: Mobility training (Done)       Learning Progress Summary             Patient Acceptance, E,TB, VU by  at 9/22/2023 1254    Acceptance, E,TB, VU by  at 9/22/2023 0951                         Point: Home exercise program (Done)       Learning Progress Summary             Patient Acceptance, E,TB, VU by  at 9/22/2023 1254    Acceptance, E,TB, VU by  at 9/22/2023 0951                         Point: Body mechanics (Done)       Learning Progress Summary             Patient Acceptance, E,TB, VU by  at 9/22/2023 1254    Acceptance, E,TB, VU by  at 9/22/2023 0951                         Point: Precautions (Done)       Learning Progress Summary             Patient Acceptance, E,TB, VU by  at 9/22/2023 1254    Acceptance, E,TB, VU by  at 9/22/2023 0951                                         User Key       Initials Effective Dates Name Provider Type [facility] 06/16/21 -  Johanny Lazaro LPN Licensed Nurse Nurse     03/02/22 -  Leilani Purcell PT Physical Therapist PT                  PT Recommendation and Plan  Planned Therapy Interventions (PT): home exercise program, patient/family education, ROM (range of motion), gait training  Plan of Care Reviewed With: patient  Outcome Evaluation: Odalis Solo is a 68 y.o. female who underwent R TKA revision by Dr. Anderson on 9/21/23 and is seen by PT on POD #1.  Pt lives alone at baseline but son will stay with her overnight for the next day or so. She reports that her neighbors are also available to assist.  She has  no ALIRIO and was ambulating with a SPC prior to admission.  At time of evaluation, she is A&O x 4 and denies knee pain.  PT reviewed and provided written HEP and pt is able to complete all exercises after initial cues. Pt demonstrates independence with bed mobility and transfers and ambulated 60' with RW mod Independently. She would benefit from HH PT at discharge.     Time Calculation:   PT Evaluation Complexity  History, PT Evaluation Complexity: 1-2 personal factors and/or comorbidities  Examination of Body Systems (PT Eval Complexity): total of 3 or more elements  Clinical Presentation (PT Evaluation Complexity): evolving  Clinical Decision Making (PT Evaluation Complexity): low complexity  Overall Complexity (PT Evaluation Complexity): low complexity     PT Charges       Row Name 09/22/23 1256             Time Calculation    Start Time 0939  -CL      Stop Time 1005  -CL      Time Calculation (min) 26 min  -CL      PT Received On 09/22/23  -CL      PT - Next Appointment 09/24/23  -CL      PT Goal Re-Cert Due Date 10/06/23  -CL         Time Calculation- PT    Total Timed Code Minutes- PT 0 minute(s)  -CL                User Key  (r) = Recorded By, (t) = Taken By, (c) = Cosigned By      Initials Name Provider Type    CL Leilani Purcell, PT Physical Therapist                  Therapy Charges for Today       Code Description Service Date Service Provider Modifiers Qty    30531626384 HC PT EVAL LOW COMPLEXITY 4 9/22/2023 Leilani Purcell, PT GP 1            PT G-Codes  AM-PAC 6 Clicks Score (PT): 23  PT Discharge Summary  Anticipated Discharge Disposition (PT): home with home health    Leilani Purcell PT  9/22/2023

## 2023-09-22 NOTE — PLAN OF CARE
Goal Outcome Evaluation:                      Patient discharging home with family. Will follow up with dr randall at next scheduled appt

## 2023-09-22 NOTE — CASE MANAGEMENT/SOCIAL WORK
Continued Stay Note  NATHALIE Loco     Patient Name: Odalis Solo  MRN: [id number]  Today's Date: 9/22/2023    Admit Date: 9/21/2023    [facility].   Discharge Plan       Row Name 09/22/23 1404       Plan    Plan Comments Lewis deliverred and signed.  PT stated she had lent out her RW.  pt declined wanting to pay $75 out of pock for new.  She planned to call who she lent it to to request it back.                   Discharge Codes    No documentation.                 Expected Discharge Date and Time       Expected Discharge Date Expected Discharge Time    Sep 22, 2023               Leni Mcallister RN

## 2023-09-22 NOTE — DISCHARGE INSTRUCTIONS
Total Knee  Discharge Instructions  Dr. MARCIA Alberts” Justin II  [phone]    INCISION CARE  Wash your hands prior to dressing changes  LU Wound VAC: Postoperatively you had a LU Wound Vac placed on the incision. This was placed under sterile conditions in the operating room. It remains in place for 7 days postoperatively. After 7 days, the entire dressing must be removed, including all of the sticky adhesive. The dressing and battery pack provide gentle suction to the incision and provide several benefits over a traditional dressing:  It maintains the sterile environment of the OR and reduces the risk of infection  The suction removes unwanted buildup of blood/hematoma under the skin to reduce swelling  The suction also promotes fresh blood supply to the skin and soft tissue to speed up healing  The postoperative scar is reduced in size  Showering is permitted immediately after surgery, but the battery pack must be protected or removed during the shower.   After 7 days the LU Wound Vac is removed. If there is no drainage, no dressing is required. If there is some scant drainage a dry bandage can be applied and changed daily until seen in the office or until the drainage stops.   No creams or ointments to the incisions until 4 weeks post op.  Do not touch or pick at the incision  Check incision every day and notify surgeon immediately if any of the following signs or symptoms are seen:  Increase in redness  Increase in swelling around the incision and of the entire extremity  Increase in pain  NEW drainage or oozing from the incision  Pulling apart of the edges of the incision  Increase in overall body temperature (greater than 100.4 degrees)  Zip-Line: your incision was closed with a state of the art device.   Is a non-invasive and easy to use wound closure device that replaces sutures, staples and glue for surgical incisions  It minimizes scarring and eliminating “railroad” marks that come with staples or  sutures  It makes removal as atraumatic as peeling off a bandage  Can be removed at home or by a physical therapist or nurse at 14 days postoperatively    ACTIVITIES  Exercises:  Physical therapy will begin immediately while in the hospital. Patients going to a nursing home will get therapy as part of their care at the SNU/SNF facility. Patients going home may also have a therapist come to the house to help them mobilize until they can safely get to an outpatient therapy facility.  Elevate the affected leg most of the day during the first week post operatively. Caution must be taken to avoid pillow placement directly under the heel of the leg, as this can cause pressure ulcers even with a soft pillow. All pillows and blankets should be placed underneath of the thigh and calf so that the heel is free-floating.  Use cold packs for 20-30 minutes approximately 5 times per day.  You should perform the daily stretching and strengthening exercises as taught by the therapist as often as possible. This can be done many times a day.  Full weight bearing is allowed after surgery. It will be sore/painful to put weight on the leg, but this will help the bone to heal and prevent complications such as pneumonia, bed sores and blood clots. Mobilization is vital to the recovery process.  Activities of Daily Living:  No tub baths, hot tubs, or swimming pools for 4 weeks.  May shower and let water run over the incision immediately after surgery. The battery pack of the LU Wound Vac must be protected or removed while in the shower. After the LU is removed 7 days after surgery showering is permitted as long as there is no drainage from the wound.     Restrictions  Weight: It is ok to allow full weight bearing after surgery. Weight on the leg actually quickens the recovery process. While it will be sore/painful to put weight on the leg, it is safe to do so. Hip replacement after hip fracture has a much slower recover process. It can  take months to heal fully from a hip fracture and patients even make some slow benefits up to a year afterwards.   Driving: Many patients have questions about when it is safe to return to driving. The answer is that this is extremely variable. It depends on the extent of the surgery, as well as how quickly you heal. Certainly left leg surgeries make returning to driving easier while right leg surgeries require more extensive rehabilitation before driving can be safe. Until you can press down on the brake hard, and are off of all narcotics, driving is not permitted. Your surgeon cannot “clear” you to return to driving, only you can make the decision when you feel it is safe.    Medications  Anticoagulants: After upper extremity surgery most patients do not require an anticoagulant unless you have another injury that will be keeping you from mobilizing. Lower extremity surgery typically does require use of an anticoagulation medicine.   IF YOU HAD LOWER EXTREMITY SURGERY AND ARE NOT DISCHARGED HOME WITH ANY ANTICOAGULANT MEDICINE YOU SHOULD TAKE ASPIRIN 325mg DAILY FOR 30 DAYS POSTOPERATIVELY.  If you are discharged home with an anticoagulant such as Aspirin, Xarelto, Eliquis, Coumadin, or Lovenox, follow these simple instructions:   Notify surgeon immediately if any kate bleeding is noted in the urine, stool, emesis, or from the nose or the incision. Blood in the stool will often appear as black rather than red. Blood in urine may appear as pink. Blood in emesis may appear as brown/black like coffee grounds.  You will need to apply pressure for longer periods of time to any cuts or abrasions to stop bleeding  Avoid alcohol while taking anticoagulants  Most anticoagulants are to be taken for 30 days postoperatively. After this time, you may stop using them unless instructed otherwise.   If you were already taking an anticoagulant (commonly Aspirin, Coumadin, or Plavix) you will likely be resuming your normal dose  postoperatively and will be continuing that medication at the discretion of the prescribing physician.  Stool Softeners: You will be at greater risk of constipation after surgery due to being less mobile and the pain medications.  Take stool softeners as needed. Over the counter Colace 100 mg 1-2 capsules twice daily can be taken.  If stools become too loose or too frequent, please decreases the dosage or stop the stool softener.  If constipation occurs despite use of stool softeners, you are to continue the stool softeners and add a laxative (Milk of Magnesia 1 ounce daily as needed)  Drink plenty of fluids, and eat fruits and vegetables during your recovery time. Getting up and mobilizing will help the bowels to recover their regular function, as will weaning off of all narcotics when the pain becomes tolerable.  Pain Medications: Utilized after surgery are narcotics. This is some general information about these medications.  CLASSIFICATION: Pain medications are called Opioids and are narcotics  LEGALITIES: It is illegal to share narcotics with others  DRIVING: it is illegal to drive while under the influence of narcotics. Doing so is a DUI.  POTENTIAL SIDE EFFECTS: nausea, vomiting, itching, dizziness, drowsiness, dry mouth, constipation, and difficulty urinating.  POTENTIAL ADVERSE EFFECTS:  Opioid tolerance can develop with use of pain medications and this simply means that it requires more and more of the medication to control pain. However, this is seen more in patients that use opioids for longer periods of time.  Opioid dependence can develop with use of Opioids. People with opioid dependence will experience withdrawal symptoms upon cessation of the medication.  Opioid addiction can develop with use of Opioids. The incidence of this is very unlikely in patients who take the medications as ordered and stop the medications as instructed.  Opioid overdose can be dangerous, but is unlikely when the medication  is taken as ordered and stopped when ordered. It is important not to mix opioids with alcohol as this can lead to over sedation and respiratory difficulty.  DOSAGE:  After the initial surgical pain begins to resolve, you may begin to decrease the pain medication. By the end of a few weeks, you should be off of pain medications.  Refills will not be given by the office during evening hours, on weekends, or after 6 weeks post-op. You are responsible for weaning off of pain medication. You can increase the time between narcotic pills, taking one every 4 then 6 then 8 hours and so on.  To seek refills on pain medications during the initial 6-week post-operative period, you must call the office to request the refill. The office will then notify you when to  the prescription. DO NOT wait until you are out of the medication to request a refill. Prescriptions will not be filled over the weekend and depending on the schedule, it may take a couple days for the prescription to be available. Someone will have to pick the prescription in person at the office.    FOLLOW-UP VISITS  You will need to follow up in the office with your surgeon in 3 weeks, or as instructed elsewhere in your discharge paperwork. Please call this number [phone] to schedule this appointment. If you are going to an SNF/SNU facility, they will arrange for you to follow up in the office.  If you have any concerns or suspected complications prior to your follow up visit, please call the office. Do not wait until your appointment time if you suspect complications. These will need to be addressed in the office promptly.      Davey Anderson II, MD  Orthopaedic Surgery  [facility]

## 2023-09-22 NOTE — DISCHARGE SUMMARY
Orthopaedic Discharge Summary  Dr. MARCIA Alberts” [place]  [phone]    NAME: Odalis Solo PCP: Aubree Alas MD   :  MRN: [id number]  [id number] LOS:  ADMIT: 0 days  2023   AGE/SEX: 68 y.o. female DC:  today             Admitting Diagnosis: Osteoarthritis of right knee [M17.11]  Status post knee replacement [Z96.659]    Surgery Performed: [place] ARTHRP KNE CONDYLE&PLATU MEDIAL&LAT COMPARTMENTS [83116] (REVISION POLY EXCHANGE TOTAL KNEE ARTHROPLASTY WITH CORI ROBOT)    Discharge Medications:         Discharge Medications        New Medications        Instructions Start Date   oxyCODONE-acetaminophen 5-325 MG per tablet  Commonly known as: PERCOCET   1 tablet, Oral, Every 4 Hours PRN             Changes to Medications        Instructions Start Date   aspirin 81 MG EC tablet  What changed: when to take this   81 mg, Oral, Every 12 Hours      fenofibrate 160 MG tablet  What changed: when to take this   TAKE 1 TABLET BY MOUTH EVERY DAY      ondansetron 4 MG tablet  Commonly known as: ZOFRAN  What changed: Another medication with the same name was added. Make sure you understand how and when to take each.   4 mg, Oral, Every 8 Hours PRN      ondansetron 4 MG tablet  Commonly known as: Zofran  What changed: You were already taking a medication with the same name, and this prescription was added. Make sure you understand how and when to take each.   4 mg, Oral, Every 6 Hours PRN      Praluent 75 MG/ML solution auto-injector  Generic drug: Alirocumab  What changed: See the new instructions.   INJECT 1 ML INTO THE SKIN IN THE APPROPRIATE AREA AS DIRECTED EVERY 14 DAYS.      Trulicity 1.5 MG/0.5ML solution pen-injector  Generic drug: Dulaglutide  What changed: See the new instructions.   ADMINISTER 1.5 MG UNDER THE SKIN 1 TIME EVERY WEEK AS DIRECTED             Continue These Medications        Instructions Start Date   amLODIPine 5 MG tablet  Commonly known as: NORVASC   1 tablet, Oral, Daily       FLUoxetine 20 MG capsule  Commonly known as: PROzac   20 mg, Oral, Daily      furosemide 40 MG tablet  Commonly known as: LASIX   40 mg, Oral, Daily      gabapentin 600 MG tablet  Commonly known as: NEURONTIN   600 mg, Oral, Nightly      glipizide 5 MG tablet  Commonly known as: GLUCOTROL   TAKE 1 TABLET BY MOUTH TWICE DAILY      losartan 50 MG tablet  Commonly known as: Cozaar   50 mg, Oral, 2 Times Daily      metoprolol tartrate 100 MG tablet  Commonly known as: LOPRESSOR   TAKE 1 TABLET BY MOUTH TWICE DAILY.      Vascepa 1 g capsule capsule  Generic drug: icosapent ethyl   TAKE 2 CAPSULES BY MOUTH TWICE DAILY WITH MEALS               Vitals:     Vitals:    09/21/23 2048 09/22/23 0017 09/22/23 0500 09/22/23 0750   BP: 115/67 113/70 147/79 131/77   BP Location: Right arm Left arm  Left arm   Patient Position: Lying Lying  Lying   Pulse: 73 79 79 75   Resp: 16 14 16 19   Temp: 98.1 °F (36.7 °C) 98.2 °F (36.8 °C) 98.4 °F (36.9 °C) 98.6 °F (37 °C)   TempSrc: Oral Oral  Oral   SpO2: 96% 96% 98% 93%   Weight:       Height:           Labs:      Admission on 09/21/2023   Component Date Value Ref Range Status    WBC 09/11/2023 6.04  3.40 - 10.80 10*3/mm3 Final    RBC 09/11/2023 4.16  3.77 - 5.28 10*6/mm3 Final    Hemoglobin 09/11/2023 12.7  12.0 - 15.9 g/dL Final    Hematocrit 09/11/2023 38.0  34.0 - 46.6 % Final    MCV 09/11/2023 91.3  79.0 - 97.0 fL Final    MCH 09/11/2023 30.5  26.6 - 33.0 pg Final    MCHC 09/11/2023 33.4  31.5 - 35.7 g/dL Final    RDW 09/11/2023 14.2  12.3 - 15.4 % Final    RDW-SD 09/11/2023 47.6  37.0 - 54.0 fl Final    MPV 09/11/2023 10.3  6.0 - 12.0 fL Final    Platelets 09/11/2023 235  140 - 450 10*3/mm3 Final    Neutrophil % 09/11/2023 62.8  42.7 - 76.0 % Final    Lymphocyte % 09/11/2023 27.3  19.6 - 45.3 % Final    Monocyte % 09/11/2023 6.3  5.0 - 12.0 % Final    Eosinophil % 09/11/2023 2.6  0.3 - 6.2 % Final    Basophil % 09/11/2023 0.7  0.0 - 1.5 % Final    Immature Grans % 09/11/2023 0.3   0.0 - 0.5 % Final    Neutrophils, Absolute 09/11/2023 3.79  1.70 - 7.00 10*3/mm3 Final    Lymphocytes, Absolute 09/11/2023 1.65  0.70 - 3.10 10*3/mm3 Final    Monocytes, Absolute 09/11/2023 0.38  0.10 - 0.90 10*3/mm3 Final    Eosinophils, Absolute 09/11/2023 0.16  0.00 - 0.40 10*3/mm3 Final    Basophils, Absolute 09/11/2023 0.04  0.00 - 0.20 10*3/mm3 Final    Immature Grans, Absolute 09/11/2023 0.02  0.00 - 0.05 10*3/mm3 Final    nRBC 09/11/2023 0.2  0.0 - 0.2 /100 WBC Final    Glucose 09/21/2023 119 (H)  70 - 105 mg/dL Final    Serial Number: [id number]:  [id number]    Glucose 09/21/2023 117 (H)  70 - 105 mg/dL Final    Serial Number: [id number]:  [id number]    Glucose 09/21/2023 174 (H)  70 - 105 mg/dL Final    Serial Number: [id number]:  [id number]    Glucose 09/22/2023 202 (H)  65 - 99 mg/dL Final    BUN 09/22/2023 15  8 - 23 mg/dL Final    Creatinine 09/22/2023 1.19 (H)  0.57 - 1.00 mg/dL Final    Sodium 09/22/2023 139  136 - 145 mmol/L Final    Potassium 09/22/2023 4.7  3.5 - 5.2 mmol/L Final    Chloride 09/22/2023 107  98 - 107 mmol/L Final    CO2 09/22/2023 21.0 (L)  22.0 - 29.0 mmol/L Final    Calcium 09/22/2023 8.9  8.6 - 10.5 mg/dL Final    BUN/Creatinine Ratio 09/22/2023 12.6  7.0 - 25.0 Final    Anion Gap 09/22/2023 11.0  5.0 - 15.0 mmol/L Final    eGFR 09/22/2023 50.2 (L)  >60.0 mL/min/1.73 Final    WBC 09/22/2023 6.60  3.40 - 10.80 10*3/mm3 Final    RBC 09/22/2023 3.39 (L)  3.77 - 5.28 10*6/mm3 Final    Hemoglobin 09/22/2023 10.4 (L)  12.0 - 15.9 g/dL Final    Hematocrit 09/22/2023 31.5 (L)  34.0 - 46.6 % Final    MCV 09/22/2023 92.9  79.0 - 97.0 fL Final    MCH 09/22/2023 30.7  26.6 - 33.0 pg Final    MCHC 09/22/2023 33.0  31.5 - 35.7 g/dL Final    RDW 09/22/2023 14.5  12.3 - 15.4 % Final    RDW-SD 09/22/2023 47.3  37.0 - 54.0 fl Final    MPV 09/22/2023 8.4  6.0 - 12.0 fL Final    Platelets 09/22/2023 190  140 - 450 10*3/mm3 Final   Lab Requisition on 09/21/2023   Component Date  "Value Ref Range Status    Hepatitis C Ab 09/21/2023 Non-Reactive  Non-Reactive Final    Hepatitis B Surface Ag 09/21/2023 Non-Reactive  Non-Reactive Final    HIV-1/ HIV-2 09/21/2023 Non-Reactive  Non-Reactive Final    A non-reactive test result does not preclude the possibility of exposure to HIV or infection with HIV. An antibody response to recent exposure may take several months to reach detectable levels.        No results found.    Hospital Course:   68 y.o. female was admitted to [facility] to services of Davey Anderson II, MD with Osteoarthritis of right knee [M17.11]  Status post knee replacement [Z96.659] on 9/21/2023 and underwent [place] ARTHRP KNE CONDYLE&PLATU MEDIAL&LAT COMPARTMENTS [42667] (REVISION POLY EXCHANGE TOTAL KNEE ARTHROPLASTY WITH CORI ROBOT). Post-operatively the patient transferred to the floor where the patient underwent mobilization therapy. Opioids were titrated to achieve appropriate pain management to allow for participation in mobilization exercises. Vital signs and laboratory values are now within safe parameters for discharge. The dressings and/or incision is intact without signs or symptoms of active infection. Operative extremity neurovascular status remains intact as compared to the preoperative exam. Appropriate education re: incision care, activity levels, medications, and follow up visits was completed and all questions were answered. The patient is now deemed stable for discharge.    HOME: The patient progressed well with physical therapy. There were cleared for discharge to home. The jose guadalupen was sent home in good condition}.       R \"John\" Justin MUNROE MD  Orthopaedic Surgery  [facility]  [phone]                                               "

## 2023-09-23 ENCOUNTER — HOME CARE VISIT (OUTPATIENT)
Dept: HOME HEALTH SERVICES | Facility: HOME HEALTHCARE | Age: 68
End: 2023-09-23

## 2023-09-23 NOTE — CASE MANAGEMENT/SOCIAL WORK
Case Management Discharge Note      Final Note: home with [facility]         Selected Continued Care - Discharged on 9/22/2023 Admission date: 9/21/2023 - Discharge disposition: Home or Self Care        Home Medical Care Coordination complete.      Service Provider Selected Services Address Phone Fax Patient Preferred    [facility] [address] [facility] [place] [address] [phone] --                 Transportation Services  Private: Car    Final Discharge Disposition Code: 06 - home with home health care

## 2023-09-24 ENCOUNTER — HOME CARE VISIT (OUTPATIENT)
Dept: HOME HEALTH SERVICES | Facility: HOME HEALTHCARE | Age: 68
End: 2023-09-24
Payer: COMMERCIAL

## 2023-09-24 ENCOUNTER — HOME CARE VISIT (OUTPATIENT)
Dept: HOME HEALTH SERVICES | Facility: HOME HEALTHCARE | Age: 68
End: 2023-09-24
Payer: MEDICARE

## 2023-09-24 VITALS
SYSTOLIC BLOOD PRESSURE: 140 MMHG | OXYGEN SATURATION: 97 % | TEMPERATURE: 98.2 F | HEART RATE: 72 BPM | DIASTOLIC BLOOD PRESSURE: 90 MMHG

## 2023-09-24 PROCEDURE — G0151 HHCP-SERV OF PT,EA 15 MIN: HCPCS

## 2023-09-24 NOTE — HOME HEALTH
" 3w1,1w1  Reason for Hosp/Primary Dx/Co-morbidities: R knee replacement, DMII, HTN, HLD, depression    Focus of Care: R total knee replacement    Skilled Need: Skilled PT needed to improve patient's functional mobility, improve safety for transfers and ambulation and return patient to PLOF.      Current Functional status/mobility/DME: Upon eval patient requires Min A for transfers, Min A for gait with_a RW with an antalgic gait pattern_.  HB status/Living Arrangements: Due to dec act tolerance, weakness, decline in transfers and ambulation. Patient requires a taxing effort to leave home, putting patient at risk for falls or injury. Patient lives with in a senior living apt. One level apartment, uses a RW for safety with transfers and ambulation      Skin Integrity/wound status: ACE wrap, non removable dressing intact. remove dressing after 7 days per MD order    Code Status: Full Code    Fall Risk: High    POC confirmed with Dr. John Anderson on date 9.24.23    Plan for next visit: HEP teaching, gait training      SOC Note:  Patient goal: \"to be able to get back to normal.\"    Home Health disciplines ordered  PT SOC + 3w1,1w1 per MD orders"

## 2023-09-25 ENCOUNTER — PRIOR AUTHORIZATION (OUTPATIENT)
Dept: FAMILY MEDICINE CLINIC | Facility: CLINIC | Age: 68
End: 2023-09-25

## 2023-09-25 ENCOUNTER — HOME CARE VISIT (OUTPATIENT)
Dept: HOME HEALTH SERVICES | Facility: HOME HEALTHCARE | Age: 68
End: 2023-09-25

## 2023-09-25 VITALS
DIASTOLIC BLOOD PRESSURE: 64 MMHG | TEMPERATURE: 98.3 F | HEART RATE: 83 BPM | RESPIRATION RATE: 14 BRPM | SYSTOLIC BLOOD PRESSURE: 132 MMHG | OXYGEN SATURATION: 95 %

## 2023-09-25 PROCEDURE — G0157 HHC PT ASSISTANT EA 15: HCPCS

## 2023-09-25 NOTE — TELEPHONE ENCOUNTER
PA sent 09/25/23 for Praluent  Approvedtoday  PA Case: [id number], Status: Approved, Coverage Starts on: 6/26/2023 12:00:00 AM, Coverage Ends on: 9/24/2024 12:00:00 AM.

## 2023-09-26 NOTE — HOME HEALTH
pt sitting up and is prepared for PT session,  feeling well and with no new c/o's  pt has been attempting to ambulate more throughout the day and is using her walker at all times.  pt has not yet attempted hep review.  pt removed her kathy unit last night to shower and lost the connector, therefore unable to use.   pt was advised to keep dressing in place until 9/28.       pt was challenged today to ambulate long distance and was guarded of the R knee at times with cues needed to properly flex/extend the knee. pt was challenged today to stand for extended periods of time with mild fatigue and RLE weakness.    pt was encouraged to use the walker and educated on proper use and R knee flexion  pt was encouraged to ice/elevate following PT session           plan for next session-  cont PT with gait trg, hep review, balance trg and transfer education

## 2023-09-27 RX ORDER — ALIROCUMAB 75 MG/ML
INJECTION, SOLUTION SUBCUTANEOUS
Qty: 2 ML | Refills: 3 | Status: SHIPPED | OUTPATIENT
Start: 2023-09-27

## 2023-09-27 NOTE — TELEPHONE ENCOUNTER
Rx Refill Note  Requested Prescriptions     Pending Prescriptions Disp Refills    Praluent 75 MG/ML solution auto-injector [Pharmacy Med Name: PRALUENT 75MG/ML PF PEN INJ 2X1ML] 2 mL 3     Sig: INJECT 1 ML INTO THE SKIN IN THE APPROPRIATE AREA AS DIRECTED EVERY 14 DAYS.      Last office visit with prescribing clinician: 8/29/2023   Last telemedicine visit with prescribing clinician: Visit date not found   Next office visit with prescribing clinician: 9/29/2023                         Would you like a call back once the refill request has been completed: [] Yes [] No    If the office needs to give you a call back, can they leave a voicemail: [] Yes [] No    Rosa Maria Childs MA  09/27/23, 09:09 EDT

## 2023-09-28 ENCOUNTER — HOME CARE VISIT (OUTPATIENT)
Dept: HOME HEALTH SERVICES | Facility: HOME HEALTHCARE | Age: 68
End: 2023-09-28
Payer: MEDICARE

## 2023-09-28 VITALS
HEART RATE: 84 BPM | SYSTOLIC BLOOD PRESSURE: 132 MMHG | TEMPERATURE: 97.7 F | DIASTOLIC BLOOD PRESSURE: 70 MMHG | OXYGEN SATURATION: 95 % | RESPIRATION RATE: 14 BRPM

## 2023-09-28 PROBLEM — M25.561 CHRONIC PAIN OF RIGHT KNEE: Status: RESOLVED | Noted: 2022-12-06 | Resolved: 2023-09-28

## 2023-09-28 PROBLEM — Z96.651 STATUS POST RIGHT KNEE REPLACEMENT: Status: ACTIVE | Noted: 2023-09-28

## 2023-09-28 PROBLEM — G89.29 CHRONIC PAIN OF RIGHT KNEE: Status: RESOLVED | Noted: 2022-12-06 | Resolved: 2023-09-28

## 2023-09-28 PROCEDURE — G0157 HHC PT ASSISTANT EA 15: HCPCS

## 2023-09-28 NOTE — PATIENT INSTRUCTIONS
Health Maintenance Due   Topic Date Due    COVID-19 Vaccine (6 - Moderna series) 03/15/2023    DIABETIC FOOT EXAM  09/22/2023

## 2023-09-29 ENCOUNTER — OFFICE VISIT (OUTPATIENT)
Dept: FAMILY MEDICINE CLINIC | Facility: CLINIC | Age: 68
End: 2023-09-29
Payer: MEDICARE

## 2023-09-29 ENCOUNTER — HOME CARE VISIT (OUTPATIENT)
Dept: HOME HEALTH SERVICES | Facility: HOME HEALTHCARE | Age: 68
End: 2023-09-29
Payer: COMMERCIAL

## 2023-09-29 VITALS
DIASTOLIC BLOOD PRESSURE: 70 MMHG | OXYGEN SATURATION: 96 % | RESPIRATION RATE: 15 BRPM | SYSTOLIC BLOOD PRESSURE: 136 MMHG | TEMPERATURE: 98.9 F | HEART RATE: 83 BPM

## 2023-09-29 VITALS
HEART RATE: 69 BPM | TEMPERATURE: 97.8 F | DIASTOLIC BLOOD PRESSURE: 85 MMHG | OXYGEN SATURATION: 100 % | SYSTOLIC BLOOD PRESSURE: 153 MMHG | WEIGHT: 149 LBS | BODY MASS INDEX: 29.25 KG/M2 | HEIGHT: 60 IN

## 2023-09-29 DIAGNOSIS — E11.22 TYPE 2 DIABETES MELLITUS WITH STAGE 2 CHRONIC KIDNEY DISEASE, WITHOUT LONG-TERM CURRENT USE OF INSULIN: ICD-10-CM

## 2023-09-29 DIAGNOSIS — E66.3 OVERWEIGHT WITH BODY MASS INDEX (BMI) OF 29 TO 29.9 IN ADULT: ICD-10-CM

## 2023-09-29 DIAGNOSIS — N18.2 TYPE 2 DIABETES MELLITUS WITH STAGE 2 CHRONIC KIDNEY DISEASE, WITHOUT LONG-TERM CURRENT USE OF INSULIN: ICD-10-CM

## 2023-09-29 DIAGNOSIS — M54.50 ACUTE RIGHT-SIDED LOW BACK PAIN, UNSPECIFIED WHETHER SCIATICA PRESENT: ICD-10-CM

## 2023-09-29 DIAGNOSIS — K21.9 GASTROESOPHAGEAL REFLUX DISEASE, UNSPECIFIED WHETHER ESOPHAGITIS PRESENT: ICD-10-CM

## 2023-09-29 DIAGNOSIS — I10 BENIGN ESSENTIAL HTN: ICD-10-CM

## 2023-09-29 DIAGNOSIS — F32.A DEPRESSION, UNSPECIFIED DEPRESSION TYPE: ICD-10-CM

## 2023-09-29 DIAGNOSIS — Z96.651 STATUS POST RIGHT KNEE REPLACEMENT: Primary | ICD-10-CM

## 2023-09-29 LAB
BILIRUB BLD-MCNC: NEGATIVE MG/DL
CLARITY, POC: CLEAR
COLOR UR: YELLOW
EXPIRATION DATE: NORMAL
GLUCOSE UR STRIP-MCNC: NEGATIVE MG/DL
KETONES UR QL: NEGATIVE
LEUKOCYTE EST, POC: NEGATIVE
Lab: NORMAL
NITRITE UR-MCNC: NEGATIVE MG/ML
PH UR: 6 [PH] (ref 5–8)
PROT UR STRIP-MCNC: NEGATIVE MG/DL
RBC # UR STRIP: NEGATIVE /UL
SP GR UR: 1.02 (ref 1–1.03)
UROBILINOGEN UR QL: NORMAL

## 2023-09-29 PROCEDURE — G0157 HHC PT ASSISTANT EA 15: HCPCS

## 2023-09-29 NOTE — HOME HEALTH
pt up and feeling well today, improving but with continued weakness and rom deficits.    minimal soreness in the R knee but not signficant and continues to control with meds.      pt has been attempting hep review as able but with deficits.         pt was limited today and was guarded of the RLE with noted deficits.      dressing removed today per MD orders,  with no noted issues.  zip dressing in place and no noted drainage.        pt continues to have noted RLE weakness and rom deficits but slowly improving.   gait trg with no ad and pt was encouraged to use the cane outdoors for safety.     pt was slightly challenged to transfer to standing due to RLE weakness.    pt was encouraged to ice/elevate throughout the day as able   R knee arom 22-93       plan for next session-  cont PT with advancement as tolerated- per tkr protocol

## 2023-09-29 NOTE — PROGRESS NOTES
"Subjective   Odalis Solo is a 68 y.o. female presents for   Chief Complaint   Patient presents with    Transitional Care Management     Total knee revision right sided on 8/21/23.  No concerns was done at [facility].      Hypertension     Would like a flu shot today.       Health Maintenance Due   Topic Date Due    COVID-19 Vaccine (6 - 2023-24 season) 09/01/2023    DIABETIC FOOT EXAM  09/22/2023       DAXHPI  Odalis Solo presents today for check on status post right knee replacement, type 2 diabetes without current long term use of insulin, depression, hypertension, GERD, overweight with a body mass index of 29.    She is allergic to SULFA DRUGS.     She is fasting for lab work today.     She states with her first right knee replacement in 04/2023, after recovery she began noticing popping, snapping and clicking noises. She reports the polyethylene spacer they placed was the incorrect size and they did another surgery to correct it on 09/21/2023. She states she feels better after surgery and her appetite has returned to normal. She continues physical therapy.     She has a history of depression. Her blood glucose will within normal range. She denies nay blood glucose readings below 100 mg/dL or above 200 mg/dL. Her blood pressure is elevated today, she associates it with being in pain from her recent surgery. She checks her blood pressure at home and she states it is within normal range.     She complains of right side back pain. She denies any dysuria, fever or chills.   Vitals:    09/29/23 0958 09/29/23 1000 09/29/23 1001   BP: 166/90 153/87 153/85   BP Location: Right arm Left arm Left arm   Patient Position: Sitting Sitting Standing   Cuff Size: Adult Adult Adult   Pulse: 66 70 69   Temp: 97.8 °F (36.6 °C)     TempSrc: Temporal     SpO2: 100%     Weight: 67.6 kg (149 lb)     Height: 152.4 cm (60\")       Body mass index is 29.1 kg/m².    Current Outpatient Medications on File Prior to Visit   Medication Sig " Dispense Refill    amLODIPine (NORVASC) 5 MG tablet Take 1 tablet by mouth Daily. Indications: High Blood Pressure Disorder      aspirin 81 MG EC tablet Take 1 tablet by mouth Every 12 (Twelve) Hours for 30 days. 60 tablet 0    fenofibrate 160 MG tablet TAKE 1 TABLET BY MOUTH EVERY DAY (Patient taking differently: Take 1 tablet by mouth Every Evening.) 90 tablet 0    FLUoxetine (PROzac) 20 MG capsule Take 1 capsule by mouth Daily. 30 capsule 1    furosemide (LASIX) 40 MG tablet Take 1 tablet by mouth Daily. Indications: High Blood Pressure Disorder      gabapentin (NEURONTIN) 600 MG tablet Take 1 tablet by mouth Every Night. Indications: Diabetes with Nerve Disease 90 tablet 1    glipizide (GLUCOTROL) 5 MG tablet TAKE 1 TABLET BY MOUTH TWICE DAILY 180 tablet 1    losartan (Cozaar) 50 MG tablet Take 1 tablet by mouth 2 (Two) Times a Day. 180 tablet 1    metoprolol tartrate (LOPRESSOR) 100 MG tablet TAKE 1 TABLET BY MOUTH TWICE DAILY. (Patient taking differently: Take 1 tablet by mouth 2 (Two) Times a Day. Indications: High Blood Pressure Disorder) 180 tablet 3    ondansetron (Zofran) 4 MG tablet Take 1 tablet by mouth Every 6 (Six) Hours As Needed for Nausea or Vomiting. 30 tablet 0    oxyCODONE-acetaminophen (PERCOCET) 5-325 MG per tablet Take 1 tablet by mouth Every 4 (Four) Hours As Needed for Severe Pain. 50 tablet 0    Praluent 75 MG/ML solution auto-injector INJECT 1 ML INTO THE SKIN IN THE APPROPRIATE AREA AS DIRECTED EVERY 14 DAYS 2 mL 3    Trulicity 1.5 MG/0.5ML solution pen-injector ADMINISTER 1.5 MG UNDER THE SKIN 1 TIME EVERY WEEK AS DIRECTED (Patient taking differently: Inject 1.5 mg under the skin into the appropriate area as directed 1 (One) Time Per Week. Fri) 4 mL 1    Vascepa 1 g capsule capsule TAKE 2 CAPSULES BY MOUTH TWICE DAILY WITH MEALS 120 capsule 0     No current facility-administered medications on file prior to visit.       The following portions of the patient's history were reviewed and  updated as appropriate: allergies, current medications, past family history, past medical history, past social history, past surgical history, and problem list.    Review of Systems   Constitutional:  Negative for chills and fever.   Genitourinary:  Negative for dysuria.   DAXROS    Objective   Physical Exam  Musculoskeletal:      Comments: She does have some swelling about the right knee. The wound edges are holding together well. There is minimal erythema. No gross pus     DAXEXAM  PHQ-9 Total Score:      Assessment & Plan   Diagnoses and all orders for this visit:    1. Status post right knee replacement (Primary)  She will continue to follow up with orthopedic surgery.   2. Type 2 diabetes mellitus with stage 2 chronic kidney disease, without long-term current use of insulin  Well controlled.   3. Depression, unspecified depression type  She will continue Fluoxetine as prescribed.   4. Benign essential HTN  She will continue to monitor her blood pressure at home.   5. Gastroesophageal reflux disease, unspecified whether esophagitis present    6. Overweight with body mass index (BMI) of 29 to 29.9 in adult    7. Acute right-sided low back pain, unspecified whether sciatica present  -     POC Urinalysis Dipstick, Automated    Other orders  -     Fluzone High-Dose 65+yrs (2916-7500)        DAXPLAN  DAXRESULTS    Patient Instructions     Health Maintenance Due   Topic Date Due    COVID-19 Vaccine (6 - Moderna series) 03/15/2023    DIABETIC FOOT EXAM  09/22/2023    Transcribed from ambient dictation for Aubree Alas MD by Belinda Cole.  09/29/23   11:07 EDT    Patient or patient representative verbalized consent to the visit recording.  I have personally performed the services described in this document as transcribed by the above individual, and it is both accurate and complete.

## 2023-09-30 NOTE — HOME HEALTH
pt up, prepared for PT session,  feeling fair but with limitations.   pt is motivated to participate but admits her pain levels have been slightly increased but well managed.     pt continues to use ice elevate the RLE.      pt was limited today in all areas and continues to have weakness and rom deficits,   pt continues to guard the RLE with noted wb deficits.       minimal gains noted in R knee arom at 20-91 this date,  pt was encouraged to focus on increasing R knee flexion activities with use of the restorator and seated assisted knee flexion         plan for next session-   cont strengthening and rom activities per tkr protocol

## 2023-10-02 ENCOUNTER — HOME CARE VISIT (OUTPATIENT)
Dept: HOME HEALTH SERVICES | Facility: HOME HEALTHCARE | Age: 68
End: 2023-10-02
Payer: COMMERCIAL

## 2023-10-02 VITALS
TEMPERATURE: 97.6 F | HEART RATE: 77 BPM | SYSTOLIC BLOOD PRESSURE: 140 MMHG | DIASTOLIC BLOOD PRESSURE: 80 MMHG | OXYGEN SATURATION: 97 %

## 2023-10-02 PROCEDURE — G0151 HHCP-SERV OF PT,EA 15 MIN: HCPCS

## 2023-10-02 RX ORDER — FLUOXETINE HYDROCHLORIDE 20 MG/1
20 CAPSULE ORAL DAILY
Qty: 30 CAPSULE | Refills: 1 | Status: SHIPPED | OUTPATIENT
Start: 2023-10-02

## 2023-10-22 RX ORDER — ICOSAPENT ETHYL 1000 MG/1
CAPSULE ORAL
Qty: 120 CAPSULE | Refills: 0 | Status: SHIPPED | OUTPATIENT
Start: 2023-10-22

## 2023-10-22 RX ORDER — FENOFIBRATE 160 MG/1
160 TABLET ORAL EVERY EVENING
Qty: 90 TABLET | Refills: 3 | Status: SHIPPED | OUTPATIENT
Start: 2023-10-22

## 2023-10-22 NOTE — TELEPHONE ENCOUNTER
Rx Refill Note  Requested Prescriptions     Pending Prescriptions Disp Refills   • fenofibrate 160 MG tablet [Pharmacy Med Name: FENOFIBRATE 160MG TABLETS] 90 tablet 0     Sig: TAKE 1 TABLET BY MOUTH EVERY DAY   • Vascepa 1 g capsule capsule [Pharmacy Med Name: VASCEPA 1GM CAPSULES] 120 capsule 0     Sig: TAKE 2 CAPSULES BY MOUTH TWICE DAILY WITH MEALS      Last office visit with prescribing clinician: 9/29/2023      Next office visit with prescribing clinician: 12/1/2023   3}  Lizzy Taylor  10/22/23, 11:23 EDT

## 2023-11-02 ENCOUNTER — TELEPHONE (OUTPATIENT)
Dept: FAMILY MEDICINE CLINIC | Facility: CLINIC | Age: 68
End: 2023-11-02
Payer: MEDICARE

## 2023-11-02 NOTE — TELEPHONE ENCOUNTER
Since you are over 65 and a diabetic I would suggest that you do get checked at an urgent care tonight.  As you may need an chest x-ray and definitely need to test for COVID and flu.  Let us know how things are going first of next week.

## 2023-11-02 NOTE — TELEPHONE ENCOUNTER
Caller: Germania Odalis Hill    Relationship: Self    Best call back number: [phone]     What medication are you requesting: SOMETHING FOR THE SYMPTOMS BELOW    What are your current symptoms: COUGH, COUGHING UP YELLOWING-GREENISH PHLEGM, CHEST CONGESTION    How long have you been experiencing symptoms: 1 DAY    Have you had these symptoms before:    [x] Yes  [] No    Have you been treated for these symptoms before:   [] Yes  [x] No    If a prescription is needed, what is your preferred pharmacy and phone number: [id number] - [address] [facility] [address] [phone] [facility] [phone]      Additional notes:

## 2023-11-21 RX ORDER — ICOSAPENT ETHYL 1000 MG/1
CAPSULE ORAL
Qty: 120 CAPSULE | Refills: 0 | Status: SHIPPED | OUTPATIENT
Start: 2023-11-21

## 2023-11-21 NOTE — TELEPHONE ENCOUNTER
Rx Refill Note  Requested Prescriptions     Pending Prescriptions Disp Refills   • Vascepa 1 g capsule capsule [Pharmacy Med Name: VASCEPA 1GM CAPSULES] 120 capsule 0     Sig: TAKE 2 CAPSULES BY MOUTH TWICE DAILY WITH MEALS      Last office visit with prescribing clinician: 9/29/2023   Last telemedicine visit with prescribing clinician: Visit date not found   Next office visit with prescribing clinician: 12/1/2023       Would you like a call back once the refill request has been completed: [] Yes [] No    If the office needs to give you a call back, can they leave a voicemail: [] Yes [] No    Erika Silva MA  11/21/23, 07:42 EST

## 2023-11-28 RX ORDER — FLUOXETINE HYDROCHLORIDE 20 MG/1
20 CAPSULE ORAL DAILY
Qty: 30 CAPSULE | Refills: 0 | Status: SHIPPED | OUTPATIENT
Start: 2023-11-28 | End: 2023-12-01

## 2023-11-29 NOTE — PATIENT INSTRUCTIONS
Health Maintenance Due   Topic Date Due    COVID-19 Vaccine (6 - 2023-24 season) 09/01/2023    DIABETIC FOOT EXAM  09/22/2023    Consider RSV any time now and Covid in January    12 hour fast for lab.    Call after increases activity at [facility] and uses light therapy in 1-2 months and will refer to behavioral health if persists.   If nausea continues to recheck Dr. Stratton.

## 2023-12-01 ENCOUNTER — OFFICE VISIT (OUTPATIENT)
Dept: FAMILY MEDICINE CLINIC | Facility: CLINIC | Age: 68
End: 2023-12-01
Payer: MEDICARE

## 2023-12-01 VITALS
DIASTOLIC BLOOD PRESSURE: 85 MMHG | SYSTOLIC BLOOD PRESSURE: 139 MMHG | WEIGHT: 139.4 LBS | HEIGHT: 60 IN | BODY MASS INDEX: 27.37 KG/M2 | OXYGEN SATURATION: 99 % | TEMPERATURE: 98.7 F | HEART RATE: 73 BPM

## 2023-12-01 DIAGNOSIS — E55.9 VITAMIN D DEFICIENCY: ICD-10-CM

## 2023-12-01 DIAGNOSIS — E66.3 OVERWEIGHT WITH BODY MASS INDEX (BMI) OF 27 TO 27.9 IN ADULT: ICD-10-CM

## 2023-12-01 DIAGNOSIS — R11.0 NAUSEA: ICD-10-CM

## 2023-12-01 DIAGNOSIS — N18.2 TYPE 2 DIABETES MELLITUS WITH STAGE 2 CHRONIC KIDNEY DISEASE, WITHOUT LONG-TERM CURRENT USE OF INSULIN: ICD-10-CM

## 2023-12-01 DIAGNOSIS — G43.109 MIGRAINE WITH AURA AND WITHOUT STATUS MIGRAINOSUS, NOT INTRACTABLE: ICD-10-CM

## 2023-12-01 DIAGNOSIS — E11.22 TYPE 2 DIABETES MELLITUS WITH STAGE 2 CHRONIC KIDNEY DISEASE, WITHOUT LONG-TERM CURRENT USE OF INSULIN: ICD-10-CM

## 2023-12-01 DIAGNOSIS — K21.9 GASTROESOPHAGEAL REFLUX DISEASE, UNSPECIFIED WHETHER ESOPHAGITIS PRESENT: ICD-10-CM

## 2023-12-01 DIAGNOSIS — F32.A DEPRESSION, UNSPECIFIED DEPRESSION TYPE: ICD-10-CM

## 2023-12-01 DIAGNOSIS — Z86.73 HISTORY OF STROKE: ICD-10-CM

## 2023-12-01 DIAGNOSIS — I10 BENIGN ESSENTIAL HTN: Primary | ICD-10-CM

## 2023-12-01 DIAGNOSIS — R07.89 CHEST TIGHTNESS: ICD-10-CM

## 2023-12-01 DIAGNOSIS — E78.5 HYPERLIPIDEMIA, UNSPECIFIED HYPERLIPIDEMIA TYPE: ICD-10-CM

## 2023-12-01 RX ORDER — GABAPENTIN 600 MG/1
600 TABLET ORAL 2 TIMES DAILY
Qty: 180 TABLET | Refills: 1 | Status: SHIPPED | OUTPATIENT
Start: 2023-12-01

## 2023-12-01 RX ORDER — ONDANSETRON 4 MG/1
4 TABLET, FILM COATED ORAL EVERY 6 HOURS PRN
Qty: 30 TABLET | Refills: 0 | Status: SHIPPED | OUTPATIENT
Start: 2023-12-01

## 2023-12-01 RX ORDER — FLUOXETINE HYDROCHLORIDE 40 MG/1
40 CAPSULE ORAL DAILY
Qty: 90 CAPSULE | Refills: 1 | Status: SHIPPED | OUTPATIENT
Start: 2023-12-01

## 2023-12-01 NOTE — PROGRESS NOTES
"Subjective   Odalis Solo is a 68 y.o. female who is here for follow-up on benign hypertension, chest tightness, depression, GERD, history of stroke, hyperlipidemia, migraine, type 2 diabetes with stage 2 chronic kidney disease without long-term use of insulin, vitamin D deficiency, and overweight with a body mass index of 27 to 27.9.    She was sick on 11/09/2023 and went to urgent care because she thought she had COVID-19. She tested negative for COVID-19 at home. She had x-rays done and was told she had severe wheezing and bronchitis. She still feels husky and is unable to get her energy back. She had COVID-19 in 09/2022 and was very sick with it. She has chest tightness lately, but she thinks it is due to congestion. She denies any fever. She is coughing up clear sputum, which was green and yellow when she was sick. She denies any wheezing. She denies any chest pressure or heart palpitations.    Her depression and anxiety are \"off the charts.\" She is inquiring about increasing her  Prozac from 20 mg to 40 mg. She has tried Vraylar in the past, but it made her depression worse. She has not tried buspirone. She denies any homicidal or suicidal ideations.     The patient reports she had her total knee replacement done in 04/2023 and a right knee arthroplasty 9/21/23. She states she thinks the anesthesia affected her, and has been sedentary since summer She  notes she has signed up for a free membership at [employer]. She is inquiring if anesthesia and pain medicine can make her feel bad. She states her rehab was at the end of 09/2023.    The patient denies heartburn. She denies stroke symptoms. The patient reports she is trying to watch her saturated fats. She acknowledges to watch portion sizes and increase her walking.    The patient reports she is having headaches. She states she has decreased her gabapentin to once daily, but would to increase back to twice a day. She notes her migraines are " "intermittent. She reports her blood glucose levels are well controlled and denies any readings below 100 mg/dL or over 200 mg/dL. She is on Trulicity. Her eye exam is current.    The patient report she has been staying nauseated for at least a month. Zofran was helping, but she does not have any more. She states she was taking Zofran at least once or twice a day. She is wondering if it is due to sinus drainage.     Supplemental Information  She does not take vitamin D. She is due for a colonoscopy. She had to cancel her colonoscopy twice due to her knee surgery. She has upper and lower dentures. Her bottom dentures do not stay in place. She does self-breast exams almost every night.  Chief Complaint   Patient presents with    Depression    Anxiety    Fatigue    Diabetes     3 month check up   Patient is not fasting.       Health Maintenance Due   Topic Date Due    COVID-19 Vaccine (6 - 2023-24 season) 09/01/2023       DepressionPatient presents with the following symptoms: depressed mood.  Patient is not experiencing: palpitations.      Anxiety  Symptoms include depressed mood and nausea. Patient reports no chest pain or palpitations.     Her past medical history is significant for depression.   Fatigue  Associated symptoms include coughing, fatigue and nausea. Pertinent negatives include no chest pain, chills or fever.   Diabetes  Associated symptoms include fatigue. Pertinent negatives for diabetes include no chest pain.        Vitals:    12/01/23 1342 12/01/23 1343   BP: 132/84 139/85   BP Location: Left arm Right arm   Patient Position: Sitting Sitting   Cuff Size: Adult Adult   Pulse: 75 73   Temp: 98.7 °F (37.1 °C)    TempSrc: Temporal    SpO2: 99%    Weight: 63.2 kg (139 lb 6.4 oz)    Height: 152.4 cm (60\")      Body mass index is 27.22 kg/m².    Current Outpatient Medications on File Prior to Visit   Medication Sig Dispense Refill    amLODIPine (NORVASC) 5 MG tablet Take 1 tablet by mouth Daily. Indications: " High Blood Pressure Disorder      fenofibrate 160 MG tablet Take 1 tablet by mouth Every Evening. 90 tablet 3    furosemide (LASIX) 40 MG tablet Take 1 tablet by mouth Daily. Indications: High Blood Pressure Disorder      glipizide (GLUCOTROL) 5 MG tablet TAKE 1 TABLET BY MOUTH TWICE DAILY 180 tablet 1    losartan (Cozaar) 50 MG tablet Take 1 tablet by mouth 2 (Two) Times a Day. 180 tablet 1    metoprolol tartrate (LOPRESSOR) 100 MG tablet TAKE 1 TABLET BY MOUTH TWICE DAILY. (Patient taking differently: Take 1 tablet by mouth 2 (Two) Times a Day. Indications: High Blood Pressure Disorder) 180 tablet 3    Praluent 75 MG/ML solution auto-injector INJECT 1 ML INTO THE SKIN IN THE APPROPRIATE AREA AS DIRECTED EVERY 14 DAYS 2 mL 3    Trulicity 1.5 MG/0.5ML solution pen-injector ADMINISTER 1.5 MG UNDER THE SKIN 1 TIME EVERY WEEK AS DIRECTED (Patient taking differently: Inject 1.5 mg under the skin into the appropriate area as directed 1 (One) Time Per Week. Fri) 4 mL 1    Vascepa 1 g capsule capsule TAKE 2 CAPSULES BY MOUTH TWICE DAILY WITH MEALS 120 capsule 0    [DISCONTINUED] FLUoxetine (PROzac) 20 MG capsule TAKE 1 CAPSULE BY MOUTH DAILY 30 capsule 0    [DISCONTINUED] gabapentin (NEURONTIN) 600 MG tablet Take 1 tablet by mouth Every Night. Indications: Diabetes with Nerve Disease 90 tablet 1    [DISCONTINUED] ondansetron (Zofran) 4 MG tablet Take 1 tablet by mouth Every 6 (Six) Hours As Needed for Nausea or Vomiting. 30 tablet 0    [DISCONTINUED] azithromycin (Zithromax Z-Remberto) 250 MG tablet Take 2 tablets the first day, then 1 tablet daily for 4 days. 6 tablet 0    [DISCONTINUED] guaiFENesin-codeine (GUAIFENESIN AC) 100-10 MG/5ML liquid Take 5 mL by mouth 3 (Three) Times a Day As Needed for Cough. 90 mL 0    [DISCONTINUED] methylPREDNISolone (MEDROL) 4 MG dose pack Take as directed on package instructions. 21 tablet 0    [DISCONTINUED] oxyCODONE-acetaminophen (PERCOCET) 5-325 MG per tablet Take 1 tablet by mouth Every  4 (Four) Hours As Needed for Severe Pain. 50 tablet 0     No current facility-administered medications on file prior to visit.       The following portions of the patient's history were reviewed and updated as appropriate: allergies, current medications, past family history, past medical history, past social history, past surgical history, and problem list.    Review of Systems   Constitutional:  Positive for fatigue. Negative for chills and fever.   HENT:  Negative for hearing loss.    Respiratory:  Positive for cough. Negative for wheezing.    Cardiovascular:  Negative for chest pain and palpitations.   Gastrointestinal:  Positive for nausea.   Neurological:  Positive for headache.   Psychiatric/Behavioral:  Positive for depressed mood and stress. Negative for self-injury.        Objective   Physical Exam  Constitutional:       Appearance: Normal appearance.   HENT:      Head: Normocephalic and atraumatic.      Mouth/Throat:      Mouth: Mucous membranes are moist.      Pharynx: Oropharynx is clear.   Eyes:      Pupils: Pupils are equal, round, and reactive to light.   Neck:      Vascular: No carotid bruit.   Cardiovascular:      Rate and Rhythm: Normal rate and regular rhythm.      Pulses:           Dorsalis pedis pulses are 1+ on the right side and 1+ on the left side.        Posterior tibial pulses are 1+ on the right side and 1+ on the left side.      Heart sounds: Normal heart sounds. No murmur heard.  Pulmonary:      Effort: Pulmonary effort is normal.      Breath sounds: Normal breath sounds.   Abdominal:      General: Bowel sounds are normal. There is no distension.      Tenderness: There is no abdominal tenderness.      Hernia: No hernia is present.   Musculoskeletal:      Cervical back: No rigidity.      Right lower leg: No edema.      Left lower leg: No edema.   Feet:      Right foot:      Protective Sensation: 10 sites tested.  10 sites sensed.      Skin integrity: Skin integrity normal.      Toenail  Condition: Right toenails are normal.      Left foot:      Protective Sensation: 10 sites tested.  10 sites sensed.      Skin integrity: Skin integrity normal.      Toenail Condition: Left toenails are normal.      Comments: Diabetic Foot Exam Performed and Monofilament Test Performed    Lymphadenopathy:      Cervical: No cervical adenopathy.   Skin:     General: Skin is warm and dry.   Neurological:      Mental Status: She is alert and oriented to person, place, and time.   Psychiatric:         Behavior: Behavior normal.       PHQ-9 Total Score: 21    Assessment & Plan   Diagnoses and all orders for this visit:    1. Benign essential HTN (Primary)  -     CBC Auto Differential  -     Comprehensive Metabolic Panel    2. Chest tightness    3. Depression, unspecified depression type    4. Gastroesophageal reflux disease, unspecified whether esophagitis present  -     Magnesium    5. History of stroke    6. Hyperlipidemia, unspecified hyperlipidemia type  -     Lipid Panel    7. Migraine with aura and without status migrainosus, not intractable    8. Type 2 diabetes mellitus with stage 2 chronic kidney disease, without long-term current use of insulin  -     Hemoglobin A1c  -     Microalbumin / Creatinine Urine Ratio - Urine, Clean Catch  -     Vitamin B12  -     TSH    9. Vitamin D deficiency  -     Vitamin D,25-Hydroxy    10. Overweight with body mass index (BMI) of 27 to 27.9 in adult    11. Nausea  -     Lipase; Future    Other orders  -     FLUoxetine (PROzac) 40 MG capsule; Take 1 capsule by mouth Daily.  Dispense: 90 capsule; Refill: 1  -     gabapentin (NEURONTIN) 600 MG tablet; Take 1 tablet by mouth 2 (Two) Times a Day. Indications: Diabetes with Nerve Disease  Dispense: 180 tablet; Refill: 1  -     ondansetron (Zofran) 4 MG tablet; Take 1 tablet by mouth Every 6 (Six) Hours As Needed for Nausea or Vomiting. Indications: Nausea and Vomiting  Dispense: 30 tablet; Refill: 0        Patient Instructions     Health  Maintenance Due   Topic Date Due    COVID-19 Vaccine (6 - 2023-24 season) 09/01/2023    DIABETIC FOOT EXAM  09/22/2023    Consider RSV any time now and Covid in January    12 hour fast for lab.    Call after increases activity at [facility] and uses light therapy in 1-2 months and will refer to behavioral health if persists.   If nausea continues to recheck Dr. Stratton.     Transcribed from ambient dictation for Aubree Alas MD by Nanette Brooks.  12/01/23   15:11 EST    Patient or patient representative verbalized consent to the visit recording.  I have personally performed the services described in this document as transcribed by the above individual, and it is both accurate and complete.

## 2023-12-07 ENCOUNTER — CLINICAL SUPPORT (OUTPATIENT)
Dept: FAMILY MEDICINE CLINIC | Facility: CLINIC | Age: 68
End: 2023-12-07
Payer: MEDICARE

## 2023-12-07 DIAGNOSIS — R11.0 NAUSEA: ICD-10-CM

## 2023-12-07 LAB
25(OH)D3 SERPL-MCNC: 37.6 NG/ML (ref 30–100)
ALBUMIN SERPL-MCNC: 4.5 G/DL (ref 3.5–5.2)
ALBUMIN UR-MCNC: 4 MG/DL
ALBUMIN/GLOB SERPL: 2.4 G/DL
ALP SERPL-CCNC: 90 U/L (ref 39–117)
ALT SERPL W P-5'-P-CCNC: 11 U/L (ref 1–33)
ANION GAP SERPL CALCULATED.3IONS-SCNC: 15 MMOL/L (ref 5–15)
AST SERPL-CCNC: 17 U/L (ref 1–32)
BASOPHILS # BLD AUTO: 0.02 10*3/MM3 (ref 0–0.2)
BASOPHILS NFR BLD AUTO: 0.4 % (ref 0–1.5)
BILIRUB SERPL-MCNC: 0.3 MG/DL (ref 0–1.2)
BUN SERPL-MCNC: 5 MG/DL (ref 8–23)
BUN/CREAT SERPL: 5.4 (ref 7–25)
CALCIUM SPEC-SCNC: 9.2 MG/DL (ref 8.6–10.5)
CHLORIDE SERPL-SCNC: 103 MMOL/L (ref 98–107)
CHOLEST SERPL-MCNC: 127 MG/DL (ref 0–200)
CO2 SERPL-SCNC: 21 MMOL/L (ref 22–29)
CREAT SERPL-MCNC: 0.92 MG/DL (ref 0.57–1)
CREAT UR-MCNC: 131.6 MG/DL
DEPRECATED RDW RBC AUTO: 41.8 FL (ref 37–54)
EGFRCR SERPLBLD CKD-EPI 2021: 68.4 ML/MIN/1.73
EOSINOPHIL # BLD AUTO: 0.14 10*3/MM3 (ref 0–0.4)
EOSINOPHIL NFR BLD AUTO: 3.1 % (ref 0.3–6.2)
ERYTHROCYTE [DISTWIDTH] IN BLOOD BY AUTOMATED COUNT: 13.4 % (ref 12.3–15.4)
GLOBULIN UR ELPH-MCNC: 1.9 GM/DL
GLUCOSE SERPL-MCNC: 123 MG/DL (ref 65–99)
HBA1C MFR BLD: 5.6 % (ref 4.8–5.6)
HCT VFR BLD AUTO: 38 % (ref 34–46.6)
HDLC SERPL-MCNC: 48 MG/DL (ref 40–60)
HGB BLD-MCNC: 12.4 G/DL (ref 12–15.9)
IMM GRANULOCYTES # BLD AUTO: 0.01 10*3/MM3 (ref 0–0.05)
IMM GRANULOCYTES NFR BLD AUTO: 0.2 % (ref 0–0.5)
LDLC SERPL CALC-MCNC: 51 MG/DL (ref 0–100)
LDLC/HDLC SERPL: 0.94 {RATIO}
LIPASE SERPL-CCNC: 25 U/L (ref 13–60)
LYMPHOCYTES # BLD AUTO: 0.97 10*3/MM3 (ref 0.7–3.1)
LYMPHOCYTES NFR BLD AUTO: 21.5 % (ref 19.6–45.3)
MAGNESIUM SERPL-MCNC: 1.8 MG/DL (ref 1.6–2.4)
MCH RBC QN AUTO: 28.3 PG (ref 26.6–33)
MCHC RBC AUTO-ENTMCNC: 32.6 G/DL (ref 31.5–35.7)
MCV RBC AUTO: 86.8 FL (ref 79–97)
MICROALBUMIN/CREAT UR: 30.4 MG/G (ref 0–29)
MONOCYTES # BLD AUTO: 0.34 10*3/MM3 (ref 0.1–0.9)
MONOCYTES NFR BLD AUTO: 7.5 % (ref 5–12)
NEUTROPHILS NFR BLD AUTO: 3.03 10*3/MM3 (ref 1.7–7)
NEUTROPHILS NFR BLD AUTO: 67.3 % (ref 42.7–76)
NRBC BLD AUTO-RTO: 0 /100 WBC (ref 0–0.2)
PLATELET # BLD AUTO: 257 10*3/MM3 (ref 140–450)
PMV BLD AUTO: 10.7 FL (ref 6–12)
POTASSIUM SERPL-SCNC: 3.6 MMOL/L (ref 3.5–5.2)
PROT SERPL-MCNC: 6.4 G/DL (ref 6–8.5)
RBC # BLD AUTO: 4.38 10*6/MM3 (ref 3.77–5.28)
SODIUM SERPL-SCNC: 139 MMOL/L (ref 136–145)
TRIGL SERPL-MCNC: 170 MG/DL (ref 0–150)
TSH SERPL DL<=0.05 MIU/L-ACNC: 1.34 UIU/ML (ref 0.27–4.2)
VIT B12 BLD-MCNC: 299 PG/ML (ref 211–946)
VLDLC SERPL-MCNC: 28 MG/DL (ref 5–40)
WBC NRBC COR # BLD AUTO: 4.51 10*3/MM3 (ref 3.4–10.8)

## 2023-12-07 PROCEDURE — 82043 UR ALBUMIN QUANTITATIVE: CPT | Performed by: PREVENTIVE MEDICINE

## 2023-12-07 PROCEDURE — 82607 VITAMIN B-12: CPT | Performed by: PREVENTIVE MEDICINE

## 2023-12-07 PROCEDURE — 36415 COLL VENOUS BLD VENIPUNCTURE: CPT | Performed by: PREVENTIVE MEDICINE

## 2023-12-07 PROCEDURE — 83036 HEMOGLOBIN GLYCOSYLATED A1C: CPT | Performed by: PREVENTIVE MEDICINE

## 2023-12-07 PROCEDURE — 82306 VITAMIN D 25 HYDROXY: CPT | Performed by: PREVENTIVE MEDICINE

## 2023-12-07 PROCEDURE — 80061 LIPID PANEL: CPT | Performed by: PREVENTIVE MEDICINE

## 2023-12-07 PROCEDURE — 83690 ASSAY OF LIPASE: CPT | Performed by: PREVENTIVE MEDICINE

## 2023-12-07 PROCEDURE — 82570 ASSAY OF URINE CREATININE: CPT | Performed by: PREVENTIVE MEDICINE

## 2023-12-07 PROCEDURE — 80053 COMPREHEN METABOLIC PANEL: CPT | Performed by: PREVENTIVE MEDICINE

## 2023-12-07 PROCEDURE — 85025 COMPLETE CBC W/AUTO DIFF WBC: CPT | Performed by: PREVENTIVE MEDICINE

## 2023-12-07 PROCEDURE — 84443 ASSAY THYROID STIM HORMONE: CPT | Performed by: PREVENTIVE MEDICINE

## 2023-12-07 PROCEDURE — 83735 ASSAY OF MAGNESIUM: CPT | Performed by: PREVENTIVE MEDICINE

## 2023-12-07 NOTE — PROGRESS NOTES
Venipuncture performed on Right Arm by Erika Silva MA  with good hemostasis. Patient tolerated well. 12/07/23  Aubree Alas MD

## 2023-12-08 ENCOUNTER — TELEPHONE (OUTPATIENT)
Dept: FAMILY MEDICINE CLINIC | Facility: CLINIC | Age: 68
End: 2023-12-08
Payer: MEDICARE

## 2023-12-08 NOTE — TELEPHONE ENCOUNTER
"Relay     \"Glucose is 123 but A1c does not show excess risk of diabetes at 5.6.  Vitamin B12 is slightly low so you should increase of 1000 units a couple days per week.  Call if any other questions or concerns\"                "

## 2023-12-20 RX ORDER — ONDANSETRON 4 MG/1
4 TABLET, FILM COATED ORAL EVERY 6 HOURS PRN
Qty: 30 TABLET | Refills: 0 | Status: SHIPPED | OUTPATIENT
Start: 2023-12-20

## 2024-01-09 ENCOUNTER — TELEPHONE (OUTPATIENT)
Dept: FAMILY MEDICINE CLINIC | Facility: CLINIC | Age: 69
End: 2024-01-09

## 2024-01-09 NOTE — TELEPHONE ENCOUNTER
Caller: Odalis Solo    Relationship to patient: Self    Best call back number: [phone]     Date of positive COVID19 test: 1/9, SYMPTOMS STARTED LAST THURSDAY    COVID19 symptoms: COUGH, CONGESTION, LOSS OF TASTE AND SMELL, HEADACHES, FATIGUE    Additional information or concerns: PLEASE ADVISE ON NEXT STEPS AND WHAT THE PATIENT CAN DO OR TAKE TO RELIEVE SYMPTOMS    What is the patients preferred pharmacy: [facility] #[id number] [address] [phone] [facility] [phone]

## 2024-01-09 NOTE — TELEPHONE ENCOUNTER
Unfortunately it is too late to take any of the antiviral meds as you are already into this on day 6.  I can send some medicine to help with the cough you can use Tylenol to help with the fever rest and drink plenty of water.  If you do not have one of the little measures for your finger to check on your oxygen saturation I can send 1 of those to the pharmacy and we will see if your insurance will pay for it.  Please let me know about the above and we will try to help let us know how you are doing on Friday after you answer these questions

## 2024-01-10 RX ORDER — BENZONATATE 100 MG/1
100 CAPSULE ORAL 3 TIMES DAILY PRN
Qty: 30 CAPSULE | Refills: 0 | Status: SHIPPED | OUTPATIENT
Start: 2024-01-10

## 2024-01-11 RX ORDER — ONDANSETRON 4 MG/1
4 TABLET, FILM COATED ORAL EVERY 6 HOURS PRN
Qty: 30 TABLET | Refills: 0 | Status: SHIPPED | OUTPATIENT
Start: 2024-01-11

## 2024-01-15 RX ORDER — DULAGLUTIDE 1.5 MG/.5ML
INJECTION, SOLUTION SUBCUTANEOUS
Qty: 4 ML | Refills: 1 | Status: SHIPPED | OUTPATIENT
Start: 2024-01-15

## 2024-01-16 RX ORDER — ALIROCUMAB 75 MG/ML
INJECTION, SOLUTION SUBCUTANEOUS
Qty: 2 ML | Refills: 3 | Status: SHIPPED | OUTPATIENT
Start: 2024-01-16

## 2024-02-09 RX ORDER — ONDANSETRON 4 MG/1
4 TABLET, FILM COATED ORAL EVERY 6 HOURS PRN
Qty: 30 TABLET | Refills: 0 | Status: SHIPPED | OUTPATIENT
Start: 2024-02-09

## 2024-02-12 ENCOUNTER — TELEPHONE (OUTPATIENT)
Dept: FAMILY MEDICINE CLINIC | Facility: CLINIC | Age: 69
End: 2024-02-12

## 2024-02-12 NOTE — TELEPHONE ENCOUNTER
Caller: Odalis Solo    Relationship: Self    Best call back number: [phone]     What medication are you requesting: MEDICATION TO REPLACE TRULICITY-PHARMACY HAS BEEN UNABLE TO GET THIS MEDICATION IN. PATIENT HAS NOT HAD IT IN THREE WEEKS.     If a prescription is needed, what is your preferred pharmacy and phone number: [facility] #[id number] - [address] [facility] [address] [place] [address] - [phone] [facility] [phone] FX     Additional notes: PLEASE ADVISE

## 2024-02-13 RX ORDER — SEMAGLUTIDE 1.34 MG/ML
0.25 INJECTION, SOLUTION SUBCUTANEOUS WEEKLY
Qty: 1.5 ML | Refills: 0 | Status: SHIPPED | OUTPATIENT
Start: 2024-02-13

## 2024-02-13 NOTE — TELEPHONE ENCOUNTER
PATIENT WOULD LIKE A DIFFERENT MEDICATION- SHE DOES NOT WANT ANYHTING SENT TO A DIFFERENT PHARMACY.

## 2024-02-18 RX ORDER — METOPROLOL TARTRATE 100 MG/1
TABLET ORAL
Qty: 180 TABLET | Refills: 3 | Status: SHIPPED | OUTPATIENT
Start: 2024-02-18

## 2024-02-18 NOTE — TELEPHONE ENCOUNTER
Rx Refill Note  Requested Prescriptions     Pending Prescriptions Disp Refills   • metoprolol tartrate (LOPRESSOR) 100 MG tablet [Pharmacy Med Name: METOPROLOL TARTRATE 100MG TABLETS] 180 tablet 3     Sig: TAKE 1 TABLET BY MOUTH TWICE DAILY.   • glipizide (GLUCOTROL) 5 MG tablet [Pharmacy Med Name: GLIPIZIDE 5MG TABLETS] 180 tablet 1     Sig: TAKE 1 TABLET BY MOUTH TWICE DAILY      Last office visit with prescribing clinician: 12/1/2023      Next office visit with prescribing clinician: 3/1/2024   3}  Lizzy Taylor  02/18/24, 09:56 EST

## 2024-02-19 RX ORDER — GLIPIZIDE 5 MG/1
TABLET ORAL
Qty: 180 TABLET | Refills: 1 | Status: SHIPPED | OUTPATIENT
Start: 2024-02-19

## 2024-02-27 RX ORDER — FUROSEMIDE 40 MG/1
40 TABLET ORAL DAILY
Qty: 90 TABLET | Refills: 0 | Status: SHIPPED | OUTPATIENT
Start: 2024-02-27

## 2024-02-27 RX ORDER — FUROSEMIDE 40 MG/1
40 TABLET ORAL DAILY
Qty: 30 TABLET | Refills: 0 | Status: SHIPPED | OUTPATIENT
Start: 2024-02-27 | End: 2024-02-27

## 2024-02-27 RX ORDER — FLUOXETINE HYDROCHLORIDE 20 MG/1
20 CAPSULE ORAL DAILY
Qty: 30 CAPSULE | Refills: 1 | OUTPATIENT
Start: 2024-02-27

## 2024-02-27 NOTE — TELEPHONE ENCOUNTER
Caller: Odalis Solo    Relationship: Self    Best call back number: [phone]    What is the best time to reach you: ANYTIME    Who are you requesting to speak with (clinical staff, provider,  specific staff member): CLINICAL    What was the call regarding: PATIENT WAS WANTING TO NOW WHY THE REFILL REQUEST FOR HER furosemide (LASIX) 40 MG tablet  WAS DENIED AS SHE HAS AN UPCOMING APPOINTMENT.

## 2024-02-28 NOTE — PROGRESS NOTES
The ABCs of the Annual Wellness Visit  Subsequent Medicare Wellness Visit  Subjective    History of Present Illness:  Odalis Solo is a 68 y.o. female who presents for a Subsequent Medicare Wellness Visit.    The following portions of the patient's history were reviewed and   updated as appropriate: allergies, current medications, past family history, past medical history, past social history, past surgical history, and problem list.    Compared to one year ago, the patient feels her physical   health is worse.    Compared to one year ago, the patient feels her mental   health is worse.    Recent Hospitalizations:  She was admitted within the past 365 days at [facility].       Current Medical Providers:  Patient Care Team:  Aubree Alas MD as PCP - General (Family Medicine)  Ian Foley MD as Consulting Physician (Neurosurgery)  Nat Mendez APRN as Nurse Practitioner (Gastroenterology)  Davey Anderson MD (Inactive) as Consulting Physician (Orthopedic Surgery)    Outpatient Medications Prior to Visit   Medication Sig Dispense Refill    amLODIPine (NORVASC) 5 MG tablet Take 1 tablet by mouth Daily. Indications: High Blood Pressure Disorder      fenofibrate 160 MG tablet Take 1 tablet by mouth Every Evening. 90 tablet 3    FLUoxetine (PROzac) 40 MG capsule Take 1 capsule by mouth Daily. 90 capsule 1    furosemide (LASIX) 40 MG tablet TAKE 1 TABLET BY MOUTH DAILY FOR HIGH BLOOD PRESSURE 90 tablet 0    gabapentin (NEURONTIN) 600 MG tablet Take 1 tablet by mouth 2 (Two) Times a Day. Indications: Diabetes with Nerve Disease 180 tablet 1    glipizide (GLUCOTROL) 5 MG tablet TAKE 1 TABLET BY MOUTH TWICE DAILY 180 tablet 1    losartan (Cozaar) 50 MG tablet Take 1 tablet by mouth 2 (Two) Times a Day. 180 tablet 1    metoprolol tartrate (LOPRESSOR) 100 MG tablet TAKE 1 TABLET BY MOUTH TWICE DAILY 180 tablet 3    Misc. Devices (Pulse Oximeter For Finger) misc Use 1 each 4 (Four)  Times a Day. 1 each 0    Praluent 75 MG/ML solution auto-injector INJECT 1 ML INTO THE SKIN IN THE APPROPRIATE AREA AS DIRECTED EVERY 14 DAYS 2 mL 3    Semaglutide,0.25 or 0.5MG/DOS, (Ozempic, 0.25 or 0.5 MG/DOSE,) 2 MG/1.5ML solution pen-injector Inject 0.25 mg under the skin into the appropriate area as directed 1 (One) Time Per Week. For four weeks 1.5 mL 0    Vascepa 1 g capsule capsule TAKE 2 CAPSULES BY MOUTH TWICE DAILY WITH MEALS 120 capsule 0    ondansetron (ZOFRAN) 4 MG tablet TAKE 1 TABLET BY MOUTH EVERY 6 HOURS AS NEEDED FOR NAUSEA OR VOMITING 30 tablet 0    benzonatate (Tessalon Perles) 100 MG capsule Take 1 capsule by mouth 3 (Three) Times a Day As Needed for Cough. 30 capsule 0     No facility-administered medications prior to visit.       No opioid medication identified on active medication list. I have reviewed chart for other potential  high risk medication/s and harmful drug interactions in the elderly.        Aspirin is not on active medication list.  Aspirin use is not indicated based on review of current medical condition/s. Risk of harm outweighs potential benefits.  .  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    DDD (degenerative disc disease), cervical    Family history of diabetes mellitus    Hyperlipidemia    Leg length discrepancy    Status post hip replacement    History of stroke    Tear of acetabular labrum    Type 2 diabetes mellitus    Lumbar radiculopathy    Cervical radiculopathy    Cervical spondylosis    Complete tear of right rotator cuff    Urine frequency    Suspected COVID-19 virus infection    Ataxic gait    Chest tightness    Chiari malformation    MONGE (dyspnea on exertion)    Hypermetropia    Lumbago with sciatica    Migraine with aura    Myalgia    Nuclear sclerosis    Presbyopia    Arthrodesis  "status    Anxiety    Depression    Headache    Weakness    Class 1 obesity due to excess calories with serious comorbidity and body mass index (BMI) of 30.0 to 30.9 in adult    Transient cerebral ischemia    Cyst of right ovary    Anemia, unspecified    Diarrhea    Diverticulitis    Irritable bowel syndrome    Lower abdominal pain, unspecified    Nausea    Chronic low back pain    Status post knee replacement    Other constipation    Status post right knee replacement    Seasonal allergies    Upper respiratory infection     Advance Care Planning  Advance Directive is on file.  ACP discussion was held with the patient during this visit. Patient has an advance directive in EMR which is still valid.      Objective    Vitals:    24 1312 24 1314   BP: 131/88 137/84   BP Location: Right arm Left arm   Patient Position: Sitting Sitting   Cuff Size: Adult Adult   Pulse: 71    Temp: 98.4 °F (36.9 °C)    TempSrc: Temporal    SpO2: 97%    Weight: 69.9 kg (154 lb)    Height: 152.4 cm (60\")    PainSc: 0-No pain      Estimated body mass index is 30.08 kg/m² as calculated from the following:    Height as of this encounter: 152.4 cm (60\").    Weight as of this encounter: 69.9 kg (154 lb).           Does the patient have evidence of cognitive impairment? No    Lab Results   Component Value Date    TRIG 170 (H) 2023    HDL 48 2023    LDL 51 2023    VLDL 28 2023    HGBA1C 5.60 2023        HEALTH RISK ASSESSMENT    Smoking Status:  Social History     Tobacco Use   Smoking Status Former    Packs/day: 0.25    Years: 10.00    Additional pack years: 0.00    Total pack years: 2.50    Types: Cigarettes    Quit date: 1997    Years since quittin.4    Passive exposure: Past   Smokeless Tobacco Never     Alcohol Consumption:  Social History     Substance and Sexual Activity   Alcohol Use Yes    Comment: 2 times a month     Fall Risk Screen:    STEADI Fall Risk Assessment was completed, and " patient is at LOW risk for falls.Assessment completed on:3/1/2024    Depression Screening:      3/1/2024     1:10 PM   PHQ-2/PHQ-9 Depression Screening   Little Interest or Pleasure in Doing Things 1-->several days   Feeling Down, Depressed or Hopeless 1-->several days   PHQ-9: Brief Depression Severity Measure Score 2       Health Habits and Functional and Cognitive Screening:      3/1/2024     1:10 PM   Functional & Cognitive Status   Do you have difficulty preparing food and eating? No   Do you have difficulty bathing yourself, getting dressed or grooming yourself? No   Do you have difficulty using the toilet? No   Do you have difficulty moving around from place to place? No   Do you have trouble with steps or getting out of a bed or a chair? No   Current Diet Unhealthy Diet   Dental Exam Up to date   Eye Exam Up to date   Exercise (times per week) 0 times per week   Do you need help using the phone?  No   Are you deaf or do you have serious difficulty hearing?  No   Do you need help to go to places out of walking distance? No   Do you need help shopping? No   Do you need help preparing meals?  No   Do you need help with housework?  No   Do you need help with laundry? No   Do you need help taking your medications? No   Do you need help managing money? No   Do you ever drive or ride in a car without wearing a seat belt? No   Have you felt unusual stress, anger or loneliness in the last month? No   Who do you live with? Alone   If you need help, do you have trouble finding someone available to you? No   Have you been bothered in the last four weeks by sexual problems? No   Do you have difficulty concentrating, remembering or making decisions? Yes       Age-appropriate Screening Schedule:  Refer to the list below for future screening recommendations based on patient's age, sex and/or medical conditions. Orders for these recommended tests are listed in the plan section. The patient has been provided with a written  plan.    Health Maintenance   Topic Date Due    COVID-19 Vaccine (6 - 2023-24 season) 09/01/2023    DIABETIC EYE EXAM  01/25/2024    COLORECTAL CANCER SCREENING  04/08/2024    HEMOGLOBIN A1C  06/07/2024    DIABETIC FOOT EXAM  12/01/2024    LIPID PANEL  12/07/2024    URINE MICROALBUMIN  12/07/2024    MAMMOGRAM  01/26/2025    ANNUAL WELLNESS VISIT  03/01/2025    BMI FOLLOWUP  03/01/2025    DXA SCAN  08/22/2025    TDAP/TD VACCINES (3 - Td or Tdap) 09/29/2028    HEPATITIS C SCREENING  Completed    RSV Vaccine - Adults  Completed    INFLUENZA VACCINE  Completed    Pneumococcal Vaccine 65+  Completed    ZOSTER VACCINE  Completed    PAP SMEAR  Discontinued                CMS Preventative Services Quick Reference  Risk Factors Identified During Encounter  None Identified  The above risks/problems have been discussed with the patient.  Follow up actions/plans if indicated are seen below in the Assessment/Plan Section.  Pertinent information has been shared with the patient in the After Visit Summary.  An After Visit Summary and PPPS were made available to the patient.    Follow Up:   Next Medicare Wellness visit to be scheduled in 1 year.         Additional E&M Note during same encounter follows:  Patient has multiple medical problems which are significant and separately identifiable that require additional work above and beyond the Medicare Wellness Visit.        Chief Complaint  Medicare Wellness-subsequent, GI Problem (Gastro Referral request - Ongoing constant nausea, colonoscopy due, referral required per insurance ), Hyperlipidemia (Annual ), Diabetes (Annual ), and Generalized Body Aches (Headache, sinus issues, cough, COVID positive in November and never really felt 100% after that. )    Subjective        HPI  Odalis Solo also presents   Chief Complaint   Patient presents with    Medicare Wellness-subsequent    GI Problem     Gastro Referral request - Ongoing constant nausea, colonoscopy due, referral  required per insurance     Hyperlipidemia     Annual     Diabetes     Annual     Generalized Body Aches     Headache, sinus issues, cough, COVID positive in November and never really felt 100% after that.    .  Patient is also here for an age-specific physical and has been advised to wear sunscreen and a seatbelt.    The patient consented to the use of ESTEE.     The patient is a 68-year-old female who is here today for an age-specific physical, annual wellness exam for Medicare, type 2 diabetes mellitus with stage 2 chronic kidney disease without long term use of insulin, class 1 obesity due to excess calories with serious comorbidities and a body mass index of 30, non intractable migraine with aura and without status migrainosus, hyperlipidemia, history of stroke, GERD, depression, chest tightness, benign essential hypertension, rectal bleeding, and cyst of the right ovary.    Healthcare maintenance.   Patient's physical and mental health are worse compared to a year ago. She was admitted overnight at [facility] twice over the last year for her right total knee arthroplasty. She does not take aspirin on a daily basis. Her eye exam appointment is scheduled for next week. She is due for her dental cleaning. Advance directive is on file and no changes were made. Denies any cognitive dysfunction. She needs to be referred to gastroenterology for her colonoscopy. Patient is due for her screening mammogram. She does self breast examinations monthly.     Immunizations.   Patient is up to date on pneumonia, influenza, and RSV vaccines. She did not receive the COVID-19 vaccine this year.     Type 2 diabetes.   Patient has not been monitoring her blood glucose at home. She feels like she is having episodes of hypoglycemia. She is currently on Ozempic.     Hypertension.   Patient does not monitor her blood pressure at home.     Hyperlipidemia.   Patient does not monitor her saturated fat intake. She is still receiving her  Praluent injections.     Nausea.   Patient has been dealing with constant nausea mostly upon waking up and after a meal. She has been taking Zofran with no relief. Denies any vomiting or significant weight loss.     Rectal bleeding.   Patient denies any rectal bleeding. Her bowel movements have been mostly normal. She would have diarrhea whenever she eats food with gluten.     GERD.  Denies any dysphagia or acid reflux. She has not been taking any medications for it.     Depression.   Her depression has been well controlled.    Chest tightness.   Patient would have chest tightness whenever she is congested.    Right ovary cyst.   Patient was diagnosed with a right ovary cyst 2 years ago. She was referred to Dr. Randolph but has not had a follow-up for it. She was scheduled for an ultrasound, but it was cancelled.    Cough.  Patient complains of intermittent cough. It is associated with sinus drainage and voice hoarseness. She has had the cough since 12/2023 when she had the COVID-19 infection. She has been taking Tessalon Perles with no relief. Denies any myalgia.     Class 1 obesity due to excess calories with serious comorbidities and a body mass index of 30.  Patient has not been eating well. Denies any snoring at night.     Migraine with aura and without status migrainosus, not intractable.  Her headaches have been stable.     History of stroke.   Patient denies any stroke like symptoms.     Past medical history.   Patient reports a history of COVID-19 infection and bronchitis.         Review of Systems   Constitutional:  Negative for chills, diaphoresis, fatigue and fever.   HENT:  Negative for hearing loss and trouble swallowing.    Respiratory:  Positive for cough. Negative for shortness of breath and wheezing.    Cardiovascular:  Negative for palpitations and leg swelling.   Gastrointestinal:  Positive for nausea. Negative for abdominal pain, blood in stool, constipation, diarrhea and vomiting.  "  Genitourinary:  Negative for dysuria, hematuria, vaginal bleeding and vaginal discharge.   Neurological:  Negative for seizures and syncope.       Objective   Vital Signs:  /84 (BP Location: Left arm, Patient Position: Sitting, Cuff Size: Adult)   Pulse 71   Temp 98.4 °F (36.9 °C) (Temporal)   Ht 152.4 cm (60\")   Wt 69.9 kg (154 lb)   SpO2 97%   BMI 30.08 kg/m²     Physical Exam  HENT:      Ears:      Comments: Tympanic membranes are normal.      Mouth/Throat:      Comments: Throat looks normal.  Eyes:      Comments: Pupils are equal and reactive to light.    Neck:      Comments: No thyromegaly, thyroid masses, cervical or suboccipital adenopathy. Carotids are free of bruit  Cardiovascular:      Comments: Rate and rhythm are normal. No heart murmurs.  Pulmonary:      Comments: Breath sounds are decreased and equal in all six lung fields. No wheezes, rales, or rhonchi.  Abdominal:      Comments: Bowel sounds are normal, active. No masses, tenderness, or organomegaly. No tenderness over the bladder.                         Assessment and Plan   Diagnoses and all orders for this visit:    1. Encounter for annual general medical examination with abnormal findings in adult (Primary)    2. Type 2 diabetes mellitus with stage 2 chronic kidney disease, without long-term current use of insulin  -     Comprehensive Metabolic Panel  -     Hemoglobin A1c  -     Microalbumin / Creatinine Urine Ratio - Urine, Clean Catch    3. Class 1 obesity due to excess calories with serious comorbidity and body mass index (BMI) of 30.0 to 30.9 in adult  Assessment & Plan:  Patient's (There is no height or weight on file to calculate BMI.) indicates that they are overweight with health conditions that include hypertension and diabetes mellitus . Weight is unchanged. BMI is is above average; BMI management plan is completed. We discussed portion control and increasing exercise.       4. Migraine with aura and without status " migrainosus, not intractable    5. Hyperlipidemia, unspecified hyperlipidemia type  -     Lipid Panel    6. History of stroke    7. Gastroesophageal reflux disease, unspecified whether esophagitis present  -     CBC Auto Differential  -     Magnesium    8. Depression, unspecified depression type  -     Vitamin B12  -     TSH Rfx On Abnormal To Free T4; Future    9. Chest tightness    10. Benign essential HTN    11. Rectal bleeding    12. Cyst of right ovary  -     Ambulatory Referral to Obstetrics / Gynecology    13. Nausea  -     Ambulatory Referral to Gastroenterology    14. Screening for colon cancer  -     Ambulatory Referral For Screening Colonoscopy    15. Seasonal allergies    16. Upper respiratory tract infection, unspecified type    Other orders  -     promethazine (PHENERGAN) 25 MG tablet; Take 1 tablet by mouth Every 8 (Eight) Hours As Needed for Nausea or Vomiting.  Dispense: 30 tablet; Refill: 0      Encounter for annual general medical examination with abnormal findings in adult.  Recommended routine eye exam and dental cleaning. She does not want any additional information on alcohol misuse, chronic pain, depression, drug use, fall risk, glaucoma, hearing problems, immunizations, inactivity, polypharmacy, high-risk sexual behavior, tobacco use, or urinary incontinence. Follow-up in 3 months.     Type 2 diabetes mellitus with stage 2 chronic kidney disease, without long-term current use of insulin.  CMP, hemoglobin A1c, and urine microalbumin creatinine ratio were ordered. Advised patient to monitor blood glucose at home and continue with her current medications.     Class 1 obesity due to excess calories with serious comorbidity and body mass index (BMI) of 30.0 to 30.9 in adult.  Advised patient to monitor food portion size and increase walking.     Migraine with aura and without status migrainosus, not intractable.  This has been stable.     Hyperlipidemia.  Lipid panel ordered. Advised patient to  monitor saturated fat intake.     History of stroke.  Patient denies any stroke like symptoms.     Gastroesophageal reflux disease, unspecified whether esophagitis present.  CBC and magnesium ordered.     Depression.  Vitamin B12, TSH, and free T4 were ordered.     Chest tightness.  This has been stable and only occurs with coughing and congestion.     Benign essential hypertension.   Advised patient to monitor blood pressure at home. She will continue with her current medication regimen.     Rectal bleeding.  Patient denies any rectal bleeding.     Cyst of right ovary.  The patient will be referred to gynecology for further evaluation and management.     Nausea.  The patient will be referred to gastroenterology for further evaluation and management. She will be started on promethazine 25 mg every 8 hours as needed.     Screening for colon cancer.  The patient will be referred for colonoscopy.             Follow Up   No follow-ups on file.  Patient was given instructions and counseling regarding her condition or for health maintenance advice. Please see specific information pulled into the AVS if appropriate.     Transcribed from ambient dictation for Aubree Alas MD by Soo Soares.  03/01/24   14:21 EST    Patient or patient representative verbalized consent to the visit recording.  I have personally performed the services described in this document as transcribed by the above individual, and it is both accurate and complete.

## 2024-02-28 NOTE — PATIENT INSTRUCTIONS
Health Maintenance Due   Topic Date Due    COVID-19 Vaccine (6 - 2023-24 season) 09/01/2023    DIABETIC EYE EXAM  01/25/2024    12 hour fast for labs

## 2024-02-28 NOTE — ASSESSMENT & PLAN NOTE
Patient's (There is no height or weight on file to calculate BMI.) indicates that they are overweight with health conditions that include hypertension and diabetes mellitus . Weight is unchanged. BMI is is above average; BMI management plan is completed. We discussed portion control and increasing exercise.

## 2024-03-01 ENCOUNTER — OFFICE VISIT (OUTPATIENT)
Dept: FAMILY MEDICINE CLINIC | Facility: CLINIC | Age: 69
End: 2024-03-01
Payer: MEDICARE

## 2024-03-01 VITALS
HEIGHT: 60 IN | DIASTOLIC BLOOD PRESSURE: 84 MMHG | BODY MASS INDEX: 30.23 KG/M2 | SYSTOLIC BLOOD PRESSURE: 137 MMHG | HEART RATE: 71 BPM | OXYGEN SATURATION: 97 % | WEIGHT: 154 LBS | TEMPERATURE: 98.4 F

## 2024-03-01 DIAGNOSIS — N83.201 CYST OF RIGHT OVARY: ICD-10-CM

## 2024-03-01 DIAGNOSIS — R11.0 NAUSEA: ICD-10-CM

## 2024-03-01 DIAGNOSIS — G43.109 MIGRAINE WITH AURA AND WITHOUT STATUS MIGRAINOSUS, NOT INTRACTABLE: ICD-10-CM

## 2024-03-01 DIAGNOSIS — Z12.11 SCREENING FOR COLON CANCER: ICD-10-CM

## 2024-03-01 DIAGNOSIS — Z86.73 HISTORY OF STROKE: ICD-10-CM

## 2024-03-01 DIAGNOSIS — I10 BENIGN ESSENTIAL HTN: ICD-10-CM

## 2024-03-01 DIAGNOSIS — E66.09 CLASS 1 OBESITY DUE TO EXCESS CALORIES WITH SERIOUS COMORBIDITY AND BODY MASS INDEX (BMI) OF 30.0 TO 30.9 IN ADULT: ICD-10-CM

## 2024-03-01 DIAGNOSIS — J06.9 UPPER RESPIRATORY TRACT INFECTION, UNSPECIFIED TYPE: ICD-10-CM

## 2024-03-01 DIAGNOSIS — E11.22 TYPE 2 DIABETES MELLITUS WITH STAGE 2 CHRONIC KIDNEY DISEASE, WITHOUT LONG-TERM CURRENT USE OF INSULIN: ICD-10-CM

## 2024-03-01 DIAGNOSIS — Z00.01 ENCOUNTER FOR ANNUAL GENERAL MEDICAL EXAMINATION WITH ABNORMAL FINDINGS IN ADULT: Primary | ICD-10-CM

## 2024-03-01 DIAGNOSIS — F32.A DEPRESSION, UNSPECIFIED DEPRESSION TYPE: ICD-10-CM

## 2024-03-01 DIAGNOSIS — J30.2 SEASONAL ALLERGIES: ICD-10-CM

## 2024-03-01 DIAGNOSIS — N18.2 TYPE 2 DIABETES MELLITUS WITH STAGE 2 CHRONIC KIDNEY DISEASE, WITHOUT LONG-TERM CURRENT USE OF INSULIN: ICD-10-CM

## 2024-03-01 DIAGNOSIS — E78.5 HYPERLIPIDEMIA, UNSPECIFIED HYPERLIPIDEMIA TYPE: ICD-10-CM

## 2024-03-01 DIAGNOSIS — R07.89 CHEST TIGHTNESS: ICD-10-CM

## 2024-03-01 DIAGNOSIS — K62.5 RECTAL BLEEDING: ICD-10-CM

## 2024-03-01 DIAGNOSIS — K21.9 GASTROESOPHAGEAL REFLUX DISEASE, UNSPECIFIED WHETHER ESOPHAGITIS PRESENT: ICD-10-CM

## 2024-03-01 RX ORDER — PROMETHAZINE HYDROCHLORIDE 25 MG/1
25 TABLET ORAL EVERY 8 HOURS PRN
Qty: 30 TABLET | Refills: 0 | Status: SHIPPED | OUTPATIENT
Start: 2024-03-01

## 2024-03-05 ENCOUNTER — CLINICAL SUPPORT (OUTPATIENT)
Dept: FAMILY MEDICINE CLINIC | Facility: CLINIC | Age: 69
End: 2024-03-05
Payer: MEDICARE

## 2024-03-05 DIAGNOSIS — F32.A DEPRESSION, UNSPECIFIED DEPRESSION TYPE: ICD-10-CM

## 2024-03-05 LAB
ALBUMIN SERPL-MCNC: 4.4 G/DL (ref 3.5–5.2)
ALBUMIN UR-MCNC: 1.7 MG/DL
ALBUMIN/GLOB SERPL: 2.4 G/DL
ALP SERPL-CCNC: 78 U/L (ref 39–117)
ALT SERPL W P-5'-P-CCNC: 10 U/L (ref 1–33)
ANION GAP SERPL CALCULATED.3IONS-SCNC: 16 MMOL/L (ref 5–15)
AST SERPL-CCNC: 15 U/L (ref 1–32)
BASOPHILS # BLD AUTO: 0.03 10*3/MM3 (ref 0–0.2)
BASOPHILS NFR BLD AUTO: 0.6 % (ref 0–1.5)
BILIRUB SERPL-MCNC: 0.2 MG/DL (ref 0–1.2)
BUN SERPL-MCNC: 17 MG/DL (ref 8–23)
BUN/CREAT SERPL: 15.2 (ref 7–25)
CALCIUM SPEC-SCNC: 9.2 MG/DL (ref 8.6–10.5)
CHLORIDE SERPL-SCNC: 101 MMOL/L (ref 98–107)
CHOLEST SERPL-MCNC: 167 MG/DL (ref 0–200)
CO2 SERPL-SCNC: 21 MMOL/L (ref 22–29)
CREAT SERPL-MCNC: 1.12 MG/DL (ref 0.57–1)
CREAT UR-MCNC: 78.3 MG/DL
DEPRECATED RDW RBC AUTO: 44.9 FL (ref 37–54)
EGFRCR SERPLBLD CKD-EPI 2021: 54 ML/MIN/1.73
EOSINOPHIL # BLD AUTO: 0.07 10*3/MM3 (ref 0–0.4)
EOSINOPHIL NFR BLD AUTO: 1.5 % (ref 0.3–6.2)
ERYTHROCYTE [DISTWIDTH] IN BLOOD BY AUTOMATED COUNT: 13.3 % (ref 12.3–15.4)
GLOBULIN UR ELPH-MCNC: 1.8 GM/DL
GLUCOSE SERPL-MCNC: 118 MG/DL (ref 65–99)
HBA1C MFR BLD: 5.6 % (ref 4.8–5.6)
HCT VFR BLD AUTO: 37.3 % (ref 34–46.6)
HDLC SERPL-MCNC: 37 MG/DL (ref 40–60)
HGB BLD-MCNC: 12.5 G/DL (ref 12–15.9)
IMM GRANULOCYTES # BLD AUTO: 0.01 10*3/MM3 (ref 0–0.05)
IMM GRANULOCYTES NFR BLD AUTO: 0.2 % (ref 0–0.5)
LDLC SERPL CALC-MCNC: 77 MG/DL (ref 0–100)
LDLC/HDLC SERPL: 1.73 {RATIO}
LYMPHOCYTES # BLD AUTO: 1.65 10*3/MM3 (ref 0.7–3.1)
LYMPHOCYTES NFR BLD AUTO: 35 % (ref 19.6–45.3)
MAGNESIUM SERPL-MCNC: 1.9 MG/DL (ref 1.6–2.4)
MCH RBC QN AUTO: 30.8 PG (ref 26.6–33)
MCHC RBC AUTO-ENTMCNC: 33.5 G/DL (ref 31.5–35.7)
MCV RBC AUTO: 91.9 FL (ref 79–97)
MICROALBUMIN/CREAT UR: 21.7 MG/G (ref 0–29)
MONOCYTES # BLD AUTO: 0.29 10*3/MM3 (ref 0.1–0.9)
MONOCYTES NFR BLD AUTO: 6.1 % (ref 5–12)
NEUTROPHILS NFR BLD AUTO: 2.67 10*3/MM3 (ref 1.7–7)
NEUTROPHILS NFR BLD AUTO: 56.6 % (ref 42.7–76)
NRBC BLD AUTO-RTO: 0 /100 WBC (ref 0–0.2)
PLATELET # BLD AUTO: 239 10*3/MM3 (ref 140–450)
PMV BLD AUTO: 10.9 FL (ref 6–12)
POTASSIUM SERPL-SCNC: 4 MMOL/L (ref 3.5–5.2)
PROT SERPL-MCNC: 6.2 G/DL (ref 6–8.5)
RBC # BLD AUTO: 4.06 10*6/MM3 (ref 3.77–5.28)
SODIUM SERPL-SCNC: 138 MMOL/L (ref 136–145)
TRIGL SERPL-MCNC: 330 MG/DL (ref 0–150)
TSH SERPL DL<=0.05 MIU/L-ACNC: 1.63 UIU/ML (ref 0.27–4.2)
VIT B12 BLD-MCNC: 225 PG/ML (ref 211–946)
VLDLC SERPL-MCNC: 53 MG/DL (ref 5–40)
WBC NRBC COR # BLD AUTO: 4.72 10*3/MM3 (ref 3.4–10.8)

## 2024-03-05 PROCEDURE — 83036 HEMOGLOBIN GLYCOSYLATED A1C: CPT | Performed by: PREVENTIVE MEDICINE

## 2024-03-05 PROCEDURE — 82570 ASSAY OF URINE CREATININE: CPT | Performed by: PREVENTIVE MEDICINE

## 2024-03-05 PROCEDURE — 82043 UR ALBUMIN QUANTITATIVE: CPT | Performed by: PREVENTIVE MEDICINE

## 2024-03-05 PROCEDURE — 83735 ASSAY OF MAGNESIUM: CPT | Performed by: PREVENTIVE MEDICINE

## 2024-03-05 PROCEDURE — 80061 LIPID PANEL: CPT | Performed by: PREVENTIVE MEDICINE

## 2024-03-05 PROCEDURE — 84443 ASSAY THYROID STIM HORMONE: CPT | Performed by: PREVENTIVE MEDICINE

## 2024-03-05 PROCEDURE — 85025 COMPLETE CBC W/AUTO DIFF WBC: CPT | Performed by: PREVENTIVE MEDICINE

## 2024-03-05 PROCEDURE — 80053 COMPREHEN METABOLIC PANEL: CPT | Performed by: PREVENTIVE MEDICINE

## 2024-03-05 PROCEDURE — 82607 VITAMIN B-12: CPT | Performed by: PREVENTIVE MEDICINE

## 2024-03-05 PROCEDURE — 36415 COLL VENOUS BLD VENIPUNCTURE: CPT | Performed by: PREVENTIVE MEDICINE

## 2024-03-05 NOTE — PROGRESS NOTES
Venipuncture performed on Right Arm by Rosa Maria Childs MA  with good hemostasis. Patient tolerated well. 03/05/24  Aubree Alas MD

## 2024-03-06 ENCOUNTER — TELEPHONE (OUTPATIENT)
Dept: FAMILY MEDICINE CLINIC | Facility: CLINIC | Age: 69
End: 2024-03-06
Payer: MEDICARE

## 2024-03-06 NOTE — TELEPHONE ENCOUNTER
RELAY----- Message from Aubree Alas MD sent at 3/6/2024 10:42 AM EST -----  Glucose was 118 and A1c was 5.6 showing good control.  Kidney function has decreased from 68-54 so avoid ibuprofen Aleve Motrin and limit x-ray dyes we will continue to monitor.  I would consider stopping the fenofibrate 160 to see if we can get better control of your kidney function.  If you have been doing you can with diet and exercise as well as taking the Praluent every 2 weeks we may want to consider increasing the Praluent to 150 from the 75 that you are on presently please let me know vitamin B12 is slightly decreased so increase of 1000 units a couple of days per week.  Call and let us know about the above and if you have any other questions or concerns.

## 2024-03-07 RX ORDER — LANOLIN ALCOHOL/MO/W.PET/CERES
1000 CREAM (GRAM) TOPICAL DAILY
COMMUNITY

## 2024-03-07 NOTE — TELEPHONE ENCOUNTER
Odalis Solo notified and voiced comprehension and understanding.  She will remain on the 75 of the Praluent she was not taking B12 and will start taking 1000 daily  Lizzy Taylor

## 2024-03-19 ENCOUNTER — TELEPHONE (OUTPATIENT)
Dept: FAMILY MEDICINE CLINIC | Facility: CLINIC | Age: 69
End: 2024-03-19
Payer: MEDICARE

## 2024-03-19 NOTE — TELEPHONE ENCOUNTER
Caller: Odalis Solo Liz    Relationship: Self    Best call back number: [phone]    What orders are you requesting (i.e. lab or imaging): PATIENT SPOKE WITH [facility] ON 3/19/2024 AND THEY HAD NOT RECEIVED HER ORDER TO SEE MARELY ENGEL     PLEASE RE FAX. THANK YOU

## 2024-03-28 NOTE — TELEPHONE ENCOUNTER
Rx Refill Note  Requested Prescriptions     Pending Prescriptions Disp Refills    ondansetron (ZOFRAN) 4 MG tablet [Pharmacy Med Name: ONDANSETRON 4MG TABLETS] 30 tablet 0     Sig: TAKE 1 TABLET BY MOUTH EVERY 6 HOURS AS NEEDED FOR NAUSEA OR VOMITING    Ozempic, 0.25 or 0.5 MG/DOSE, 2 MG/3ML solution pen-injector [Pharmacy Med Name: OZEMPIC 0.25 OR 0.5MG/[id number] 3ML] 3 mL      Sig: INJECT 0.25 MG UNDER THE SKIN INTO THE APPROPRIATE AREA AS DIRECTED 1 TIME PER WEEK      Last office visit with prescribing clinician: 3/1/2024   Last telemedicine visit with prescribing clinician: Visit date not found   Next office visit with prescribing clinician: 5/31/2024                         Would you like a call back once the refill request has been completed: [] Yes [] No    If the office needs to give you a call back, can they leave a voicemail: [] Yes [] No    Rosa Maria Childs MA  03/28/24, 11:54 EDT

## 2024-04-01 RX ORDER — ONDANSETRON 4 MG/1
4 TABLET, FILM COATED ORAL EVERY 6 HOURS PRN
Qty: 30 TABLET | Refills: 0 | Status: SHIPPED | OUTPATIENT
Start: 2024-04-01

## 2024-04-01 RX ORDER — SEMAGLUTIDE 0.68 MG/ML
INJECTION, SOLUTION SUBCUTANEOUS
Qty: 3 ML | Refills: 1 | Status: SHIPPED | OUTPATIENT
Start: 2024-04-01

## 2024-04-29 NOTE — ASSESSMENT & PLAN NOTE
Patient's (There is no height or weight on file to calculate BMI.) indicates that they are obese (BMI >30) with health conditions that include hypertension, diabetes mellitus, and dyslipidemias . Weight is unchanged. BMI  is above average; BMI management plan is completed. We discussed portion control and increasing exercise.

## 2024-04-29 NOTE — PATIENT INSTRUCTIONS
Health Maintenance Due   Topic Date Due    COVID-19 Vaccine (6 - 2023-24 season) 09/01/2023    COLORECTAL CANCER SCREENING  04/08/2024    Check blood pressure cuff for accuracy and send 10 blood pressures over 2 weeks.  Watch sodium, alcohol and weight

## 2024-04-30 ENCOUNTER — OFFICE VISIT (OUTPATIENT)
Dept: FAMILY MEDICINE CLINIC | Facility: CLINIC | Age: 69
End: 2024-04-30
Payer: MEDICARE

## 2024-04-30 VITALS
WEIGHT: 159 LBS | TEMPERATURE: 97.8 F | BODY MASS INDEX: 31.22 KG/M2 | DIASTOLIC BLOOD PRESSURE: 85 MMHG | SYSTOLIC BLOOD PRESSURE: 143 MMHG | OXYGEN SATURATION: 96 % | HEIGHT: 60 IN | HEART RATE: 69 BPM

## 2024-04-30 DIAGNOSIS — Z12.11 SCREENING FOR COLON CANCER: ICD-10-CM

## 2024-04-30 DIAGNOSIS — I10 BENIGN ESSENTIAL HTN: Primary | ICD-10-CM

## 2024-04-30 DIAGNOSIS — N18.2 TYPE 2 DIABETES MELLITUS WITH STAGE 2 CHRONIC KIDNEY DISEASE, WITHOUT LONG-TERM CURRENT USE OF INSULIN: ICD-10-CM

## 2024-04-30 DIAGNOSIS — E66.09 CLASS 1 OBESITY DUE TO EXCESS CALORIES WITH SERIOUS COMORBIDITY AND BODY MASS INDEX (BMI) OF 30.0 TO 30.9 IN ADULT: ICD-10-CM

## 2024-04-30 DIAGNOSIS — R10.12 LEFT UPPER QUADRANT ABDOMINAL PAIN: ICD-10-CM

## 2024-04-30 DIAGNOSIS — E11.22 TYPE 2 DIABETES MELLITUS WITH STAGE 2 CHRONIC KIDNEY DISEASE, WITHOUT LONG-TERM CURRENT USE OF INSULIN: ICD-10-CM

## 2024-04-30 DIAGNOSIS — F32.A DEPRESSION, UNSPECIFIED DEPRESSION TYPE: ICD-10-CM

## 2024-04-30 DIAGNOSIS — K21.9 GASTROESOPHAGEAL REFLUX DISEASE, UNSPECIFIED WHETHER ESOPHAGITIS PRESENT: ICD-10-CM

## 2024-04-30 LAB
ALBUMIN SERPL-MCNC: 4.3 G/DL (ref 3.5–5.2)
ALBUMIN/GLOB SERPL: 1.9 G/DL
ALP SERPL-CCNC: 104 U/L (ref 39–117)
ALT SERPL W P-5'-P-CCNC: 16 U/L (ref 1–33)
ANION GAP SERPL CALCULATED.3IONS-SCNC: 9.8 MMOL/L (ref 5–15)
AST SERPL-CCNC: 16 U/L (ref 1–32)
BASOPHILS # BLD AUTO: 0.03 10*3/MM3 (ref 0–0.2)
BASOPHILS NFR BLD AUTO: 0.6 % (ref 0–1.5)
BILIRUB BLD-MCNC: NEGATIVE MG/DL
BILIRUB SERPL-MCNC: 0.3 MG/DL (ref 0–1.2)
BUN SERPL-MCNC: 12 MG/DL (ref 8–23)
BUN/CREAT SERPL: 14.8 (ref 7–25)
CALCIUM SPEC-SCNC: 9.6 MG/DL (ref 8.6–10.5)
CHLORIDE SERPL-SCNC: 105 MMOL/L (ref 98–107)
CLARITY, POC: CLEAR
CO2 SERPL-SCNC: 24.2 MMOL/L (ref 22–29)
COLOR UR: YELLOW
CREAT SERPL-MCNC: 0.81 MG/DL (ref 0.57–1)
DEPRECATED RDW RBC AUTO: 45.4 FL (ref 37–54)
EGFRCR SERPLBLD CKD-EPI 2021: 79.7 ML/MIN/1.73
EOSINOPHIL # BLD AUTO: 0.13 10*3/MM3 (ref 0–0.4)
EOSINOPHIL NFR BLD AUTO: 2.4 % (ref 0.3–6.2)
ERYTHROCYTE [DISTWIDTH] IN BLOOD BY AUTOMATED COUNT: 13 % (ref 12.3–15.4)
EXPIRATION DATE: NORMAL
GLOBULIN UR ELPH-MCNC: 2.3 GM/DL
GLUCOSE SERPL-MCNC: 151 MG/DL (ref 65–99)
GLUCOSE UR STRIP-MCNC: NEGATIVE MG/DL
HCT VFR BLD AUTO: 39.1 % (ref 34–46.6)
HGB BLD-MCNC: 12.8 G/DL (ref 12–15.9)
IMM GRANULOCYTES # BLD AUTO: 0.02 10*3/MM3 (ref 0–0.05)
IMM GRANULOCYTES NFR BLD AUTO: 0.4 % (ref 0–0.5)
KETONES UR QL: NEGATIVE
LEUKOCYTE EST, POC: NEGATIVE
LIPASE SERPL-CCNC: 28 U/L (ref 13–60)
LYMPHOCYTES # BLD AUTO: 1.3 10*3/MM3 (ref 0.7–3.1)
LYMPHOCYTES NFR BLD AUTO: 24.1 % (ref 19.6–45.3)
Lab: NORMAL
MCH RBC QN AUTO: 31.1 PG (ref 26.6–33)
MCHC RBC AUTO-ENTMCNC: 32.7 G/DL (ref 31.5–35.7)
MCV RBC AUTO: 94.9 FL (ref 79–97)
MONOCYTES # BLD AUTO: 0.36 10*3/MM3 (ref 0.1–0.9)
MONOCYTES NFR BLD AUTO: 6.7 % (ref 5–12)
NEUTROPHILS NFR BLD AUTO: 3.55 10*3/MM3 (ref 1.7–7)
NEUTROPHILS NFR BLD AUTO: 65.8 % (ref 42.7–76)
NITRITE UR-MCNC: NEGATIVE MG/ML
NRBC BLD AUTO-RTO: 0 /100 WBC (ref 0–0.2)
PH UR: 5 [PH] (ref 5–8)
PLATELET # BLD AUTO: 210 10*3/MM3 (ref 140–450)
PMV BLD AUTO: 10.9 FL (ref 6–12)
POTASSIUM SERPL-SCNC: 4.7 MMOL/L (ref 3.5–5.2)
PROT SERPL-MCNC: 6.6 G/DL (ref 6–8.5)
PROT UR STRIP-MCNC: NEGATIVE MG/DL
RBC # BLD AUTO: 4.12 10*6/MM3 (ref 3.77–5.28)
RBC # UR STRIP: NEGATIVE /UL
SODIUM SERPL-SCNC: 139 MMOL/L (ref 136–145)
SP GR UR: 1.01 (ref 1–1.03)
UROBILINOGEN UR QL: NORMAL
WBC NRBC COR # BLD AUTO: 5.39 10*3/MM3 (ref 3.4–10.8)

## 2024-04-30 PROCEDURE — 81003 URINALYSIS AUTO W/O SCOPE: CPT | Performed by: PREVENTIVE MEDICINE

## 2024-04-30 PROCEDURE — 80053 COMPREHEN METABOLIC PANEL: CPT | Performed by: PREVENTIVE MEDICINE

## 2024-04-30 PROCEDURE — 83690 ASSAY OF LIPASE: CPT | Performed by: PREVENTIVE MEDICINE

## 2024-04-30 PROCEDURE — 85025 COMPLETE CBC W/AUTO DIFF WBC: CPT | Performed by: PREVENTIVE MEDICINE

## 2024-04-30 PROCEDURE — 3079F DIAST BP 80-89 MM HG: CPT | Performed by: PREVENTIVE MEDICINE

## 2024-04-30 PROCEDURE — 3077F SYST BP >= 140 MM HG: CPT | Performed by: PREVENTIVE MEDICINE

## 2024-04-30 PROCEDURE — 99214 OFFICE O/P EST MOD 30 MIN: CPT | Performed by: PREVENTIVE MEDICINE

## 2024-04-30 PROCEDURE — 3044F HG A1C LEVEL LT 7.0%: CPT | Performed by: PREVENTIVE MEDICINE

## 2024-04-30 PROCEDURE — 1160F RVW MEDS BY RX/DR IN RCRD: CPT | Performed by: PREVENTIVE MEDICINE

## 2024-04-30 PROCEDURE — 1159F MED LIST DOCD IN RCRD: CPT | Performed by: PREVENTIVE MEDICINE

## 2024-04-30 RX ORDER — ASPIRIN 81 MG/1
1 TABLET ORAL DAILY
COMMUNITY

## 2024-04-30 NOTE — PROGRESS NOTES
Venipuncture performed on Left Arm by Rosa Maria Childs MA  with good hemostasis. Patient tolerated well. 04/30/24

## 2024-04-30 NOTE — PROGRESS NOTES
Subjective   Odalis Solo is a 68 y.o. female presents for   Chief Complaint   Patient presents with    Abdominal Pain     Worsening.  Has an appointment with Gastro in June.  Thinks the pain could be from previously taking ozempic    Hypertension     Took BP meds within the last 40 minutes.  Patient is not fasting        Health Maintenance Due   Topic Date Due    COVID-19 Vaccine (6 - 2023-24 season) 09/01/2023    COLORECTAL CANCER SCREENING  04/08/2024       Abdominal Pain  Associated symptoms include nausea.   Hypertension       History of Present Illness  The patient is a 68-year-old female who is here for benign hypertension, class 1 obesity due to excess calories with serious comorbidities, depression, type 2 diabetes with stage 2 chronic kidney disease, and allergic to SULFA.    The patient, who is fasting, consumed a peanut butter sandwich at approximately 3:00 AM today. She reports experiencing blurry vision, which was diagnosed as cataracts by an ophthalmologist. She currently wears dentures and denies any dysphagia. She has been experiencing abdominal pain for several months, accompanied by daily nausea. She was previously on Trulicity for several years, which was discontinued due to unavailability, and she was subsequently started on Ozempic. She discontinued Ozempic 2 to 3 weeks ago, and her symptoms have improved, but have not completely resolved. She reports tenderness and pain in the left upper quadrant, along with yellow, watery stools. She has a scheduled appointment with a gastroenterologist in 06/2024 and believes a CT scan may be necessary. Her diet is generally unchanged and she has not vomited. She has been taking Zofran almost daily. Her last bowel movement was this morning, which was light brown and formed. She has gained weight, which she attributes to decreased activity. She denies any fever or hematochezia. She denies any dysuria or hematuria. She has an appointment scheduled for  "colonoscopy and EGD on 06/01/2024. She has a history of diverticulitis, but her current pain is different from her previous lower abdominal pain. She describes her pain as sharp, localized to the left quadrant, with occasional right-sided discomfort. Her last labs were conducted on 03/05/2024, and she was experiencing intermittent symptoms at that time. She takes Phenergan as needed for severe nausea. She continues to experience diarrhea when consuming gluten-containing foods. Her diet primarily consists of mashed potatoes and gravy. She has a history of cholecystectomy. She has a history of diverticulitis.    Supplemental Information  She denies any chest pressure or heart flutters. Her mood is better. She denies any homicidal or suicidal ideations. Her blood sugar levels have not dropped below 100 or over 200. She has a blood pressure cuff at home. She usually takes her blood pressure medication first thing in the morning.   She is allergic to SULFA.    Vitals:    04/30/24 1025 04/30/24 1026 04/30/24 1027   BP: 145/85 156/88 143/85   BP Location: Left arm Right arm Right arm   Patient Position: Sitting Sitting Sitting   Cuff Size: Adult Adult Adult   Pulse: 66 66 69   Temp: 97.8 °F (36.6 °C)     TempSrc: Temporal     SpO2: 96%     Weight: 72.1 kg (159 lb)     Height: 152.4 cm (60\")       Body mass index is 31.05 kg/m².    Current Outpatient Medications on File Prior to Visit   Medication Sig Dispense Refill    amLODIPine (NORVASC) 5 MG tablet Take 1 tablet by mouth Daily. Indications: High Blood Pressure Disorder      aspirin 81 MG EC tablet Take 1 tablet by mouth Daily.      FLUoxetine (PROzac) 40 MG capsule Take 1 capsule by mouth Daily. 90 capsule 1    furosemide (LASIX) 40 MG tablet TAKE 1 TABLET BY MOUTH DAILY FOR HIGH BLOOD PRESSURE 90 tablet 0    gabapentin (NEURONTIN) 600 MG tablet Take 1 tablet by mouth 2 (Two) Times a Day. Indications: Diabetes with Nerve Disease 180 tablet 1    glipizide (GLUCOTROL) 5 " MG tablet TAKE 1 TABLET BY MOUTH TWICE DAILY 180 tablet 1    losartan (Cozaar) 50 MG tablet Take 1 tablet by mouth 2 (Two) Times a Day. 180 tablet 1    metoprolol tartrate (LOPRESSOR) 100 MG tablet TAKE 1 TABLET BY MOUTH TWICE DAILY 180 tablet 3    Misc. Devices (Pulse Oximeter For Finger) misc Use 1 each 4 (Four) Times a Day. 1 each 0    ondansetron (ZOFRAN) 4 MG tablet TAKE 1 TABLET BY MOUTH EVERY 6 HOURS AS NEEDED FOR NAUSEA OR VOMITING 30 tablet 0    Praluent 75 MG/ML solution auto-injector INJECT 1 ML INTO THE SKIN IN THE APPROPRIATE AREA AS DIRECTED EVERY 14 DAYS 2 mL 3    promethazine (PHENERGAN) 25 MG tablet Take 1 tablet by mouth Every 8 (Eight) Hours As Needed for Nausea or Vomiting. 30 tablet 0    vitamin B-12 (CYANOCOBALAMIN) 1000 MCG tablet Take 1 tablet by mouth Daily.      [DISCONTINUED] fenofibrate 160 MG tablet Take 1 tablet by mouth Every Evening. (Patient not taking: Reported on 4/30/2024) 90 tablet 3    [DISCONTINUED] Semaglutide,0.25 or 0.5MG/DOS, (Ozempic, 0.25 or 0.5 MG/DOSE,) 2 MG/3ML solution pen-injector INJECT 0.25 MG UNDER THE SKIN INTO THE APPROPRIATE AREA AS DIRECTED 1 TIME PER WEEK (Patient not taking: Reported on 4/30/2024) 3 mL 1    [DISCONTINUED] Vascepa 1 g capsule capsule TAKE 2 CAPSULES BY MOUTH TWICE DAILY WITH MEALS (Patient not taking: Reported on 4/30/2024) 120 capsule 0     No current facility-administered medications on file prior to visit.       The following portions of the patient's history were reviewed and updated as appropriate: allergies, current medications, past family history, past medical history, past social history, past surgical history, and problem list.    Review of Systems   Gastrointestinal:  Positive for abdominal pain, nausea and indigestion.       Objective   Physical Exam  Vitals reviewed.   Constitutional:       General: She is not in acute distress.     Appearance: Normal appearance. She is well-developed. She is not ill-appearing or toxic-appearing.    HENT:      Head: Normocephalic and atraumatic.      Right Ear: Tympanic membrane, ear canal and external ear normal.      Left Ear: Tympanic membrane, ear canal and external ear normal.      Nose: Nose normal.      Mouth/Throat:      Mouth: Mucous membranes are moist.      Pharynx: No posterior oropharyngeal erythema.   Eyes:      Extraocular Movements: Extraocular movements intact.      Conjunctiva/sclera: Conjunctivae normal.      Pupils: Pupils are equal, round, and reactive to light.   Cardiovascular:      Rate and Rhythm: Normal rate and regular rhythm.      Heart sounds: Normal heart sounds.   Pulmonary:      Effort: Pulmonary effort is normal.      Breath sounds: Normal breath sounds.   Abdominal:      General: Abdomen is flat. Bowel sounds are normal. There is no distension.      Palpations: Abdomen is soft. There is no mass.      Tenderness: There is abdominal tenderness. There is no right CVA tenderness, left CVA tenderness, guarding or rebound.      Hernia: No hernia is present.   Musculoskeletal:         General: Normal range of motion.      Cervical back: Neck supple.   Feet:      Right foot:      Protective Sensation: 10 sites tested.        Comments:     Skin:     General: Skin is warm.   Neurological:      General: No focal deficit present.      Mental Status: She is alert and oriented to person, place, and time.   Psychiatric:         Mood and Affect: Mood normal.         Behavior: Behavior normal.       Physical Exam  Throat appears normal.    PHQ-9 Total Score:    Results           Assessment & Plan   Diagnoses and all orders for this visit:    1. Benign essential HTN (Primary)    2. Class 1 obesity due to excess calories with serious comorbidity and body mass index (BMI) of 30.0 to 30.9 in adult  Assessment & Plan:  Patient's (There is no height or weight on file to calculate BMI.) indicates that they are obese (BMI >30) with health conditions that include hypertension, diabetes mellitus, and  dyslipidemias . Weight is unchanged. BMI  is above average; BMI management plan is completed. We discussed portion control and increasing exercise.       3. Depression, unspecified depression type    4. Type 2 diabetes mellitus with stage 2 chronic kidney disease, without long-term current use of insulin    5. Left upper quadrant abdominal pain  -     Lipase; Future  -     POC Urinalysis Dipstick, Automated  -     Comprehensive Metabolic Panel  -     CBC Auto Differential  -     CT Abdomen Pelvis Without Contrast; Future  -     Lipase    6. Screening for colon cancer  -     Ambulatory Referral For Screening Colonoscopy    7. Gastroesophageal reflux disease, unspecified whether esophagitis present  -     Ambulatory referral for Screening EGD      Assessment & Plan  1. Abdominal pain in the left upper quadrant.  In order to further evaluate the patient's condition, I will proceed with ordering laboratory tests. Additionally, a point-of-care dipstick test will be conducted. A colonoscopy, EGD, and CAT scan of the abdomen have been ordered. The patient has been advised to abstain from consuming junk food. In the event of symptom exacerbation, the patient is advised to seek immediate medical attention at the emergency room.    Follow-up  The patient is scheduled for a follow-up visit in 3 months.    Patient Instructions     Health Maintenance Due   Topic Date Due    COVID-19 Vaccine (6 - 2023-24 season) 09/01/2023    COLORECTAL CANCER SCREENING  04/08/2024    Check blood pressure cuff for accuracy and send 10 blood pressures over 2 weeks.  Watch sodium, alcohol and weight        Patient or patient representative verbalized consent for the use of Ambient Listening during the visit with  Aubree Alas MD for chart documentation. 4/30/2024  13:07 EDT

## 2024-05-01 ENCOUNTER — TELEPHONE (OUTPATIENT)
Dept: FAMILY MEDICINE CLINIC | Facility: CLINIC | Age: 69
End: 2024-05-01
Payer: MEDICARE

## 2024-05-02 RX ORDER — ONDANSETRON 4 MG/1
4 TABLET, FILM COATED ORAL EVERY 6 HOURS PRN
Qty: 30 TABLET | Refills: 0 | Status: SHIPPED | OUTPATIENT
Start: 2024-05-02

## 2024-05-09 ENCOUNTER — HOSPITAL ENCOUNTER (OUTPATIENT)
Dept: CT IMAGING | Facility: HOSPITAL | Age: 69
Discharge: HOME | End: 2024-05-09
Admitting: PREVENTIVE MEDICINE
Payer: MEDICARE

## 2024-05-09 DIAGNOSIS — R10.12 LEFT UPPER QUADRANT ABDOMINAL PAIN: ICD-10-CM

## 2024-05-09 PROCEDURE — 74176 CT ABD & PELVIS W/O CONTRAST: CPT

## 2024-05-13 RX ORDER — GABAPENTIN 600 MG/1
600 TABLET ORAL 2 TIMES DAILY
Qty: 180 TABLET | Refills: 1 | Status: SHIPPED | OUTPATIENT
Start: 2024-05-13

## 2024-05-13 NOTE — TELEPHONE ENCOUNTER
Rx Refill Note  Requested Prescriptions     Pending Prescriptions Disp Refills   • gabapentin (NEURONTIN) 600 MG tablet [Pharmacy Med Name: GABAPENTIN 600MG TABLETS] 180 tablet 1     Sig: TAKE 1 TABLET BY MOUTH TWICE DAILY      Last office visit with prescribing clinician: 4/30/2024   Last telemedicine visit with prescribing clinician: Visit date not found   Next office visit with prescribing clinician: 7/30/2024       Would you like a call back once the refill request has been completed: [] Yes [] No    If the office needs to give you a call back, can they leave a voicemail: [] Yes [] No    Erika Silva MA  05/13/24, 09:19 EDT

## 2024-05-20 RX ORDER — FUROSEMIDE 40 MG/1
40 TABLET ORAL DAILY
Qty: 90 TABLET | Refills: 0 | Status: SHIPPED | OUTPATIENT
Start: 2024-05-20

## 2024-05-20 RX ORDER — FLUOXETINE HYDROCHLORIDE 40 MG/1
40 CAPSULE ORAL DAILY
Qty: 90 CAPSULE | Refills: 1 | Status: SHIPPED | OUTPATIENT
Start: 2024-05-20

## 2024-05-28 ENCOUNTER — TELEPHONE (OUTPATIENT)
Dept: FAMILY MEDICINE CLINIC | Facility: CLINIC | Age: 69
End: 2024-05-28
Payer: MEDICARE

## 2024-05-28 RX ORDER — HYDROXYZINE HYDROCHLORIDE 25 MG/1
25 TABLET, FILM COATED ORAL 3 TIMES DAILY PRN
Qty: 30 TABLET | Refills: 0 | Status: SHIPPED | OUTPATIENT
Start: 2024-05-28

## 2024-05-28 RX ORDER — ALIROCUMAB 75 MG/ML
INJECTION, SOLUTION SUBCUTANEOUS
Qty: 2 ML | Refills: 3 | Status: SHIPPED | OUTPATIENT
Start: 2024-05-28

## 2024-05-28 NOTE — TELEPHONE ENCOUNTER
Rx Refill Note  Requested Prescriptions     Pending Prescriptions Disp Refills   • Praluent 75 MG/ML solution auto-injector [Pharmacy Med Name: PRALUENT 75MG/ML PF PEN INJ 2X1ML] 2 mL 3     Sig: INJECT 1 ML UNDER THE SKIN EVERY 14 DAYS AS DIRECTED.      Last office visit with prescribing clinician: 4/30/2024   Last telemedicine visit with prescribing clinician: Visit date not found   Next office visit with prescribing clinician: 7/30/2024       Would you like a call back once the refill request has been completed: [] Yes [] No    If the office needs to give you a call back, can they leave a voicemail: [] Yes [] No    Erika Silva MA  05/28/24, 09:23 EDT

## 2024-05-28 NOTE — TELEPHONE ENCOUNTER
Caller: Odalis Solohleen    Relationship: Self    Best call back number: [phone]    What medication are you requesting: PLEASE ADVISE    What are your current symptoms: POISON IVY, ITCHING    How long have you been experiencing symptoms: 3 DAYS    Have you had these symptoms before:    [] Yes  [x] No    Have you been treated for these symptoms before:   [] Yes  [x] No    If a prescription is needed, what is your preferred pharmacy and phone number: [id number] - [address] [phone]  - [phone]      Additional notes:  PLEASE CALL TO CONFIRM OR DISCUSS.

## 2024-05-28 NOTE — TELEPHONE ENCOUNTER
I have sent some Atarax that you can use at nighttime but it will make you fairly drowsy during the daytime.  Also I have sent some steroid cream to use if you need anything more than that we should see you in the office.  Do not put cream on areas any more than twice a day and put it on very thinly as it is the number of times not the amount you use.

## 2024-06-18 ENCOUNTER — OFFICE (AMBULATORY)
Dept: URBAN - METROPOLITAN AREA CLINIC 64 | Facility: CLINIC | Age: 69
End: 2024-06-18
Payer: MEDICAID

## 2024-06-18 VITALS
HEIGHT: 60 IN | DIASTOLIC BLOOD PRESSURE: 71 MMHG | HEART RATE: 63 BPM | WEIGHT: 163 LBS | SYSTOLIC BLOOD PRESSURE: 120 MMHG

## 2024-06-18 DIAGNOSIS — Z12.11 ENCOUNTER FOR SCREENING FOR MALIGNANT NEOPLASM OF COLON: ICD-10-CM

## 2024-06-18 DIAGNOSIS — R11.0 NAUSEA: ICD-10-CM

## 2024-06-18 DIAGNOSIS — R10.12 LEFT UPPER QUADRANT PAIN: ICD-10-CM

## 2024-06-18 PROCEDURE — 99214 OFFICE O/P EST MOD 30 MIN: CPT

## 2024-06-25 RX ORDER — ONDANSETRON 4 MG/1
4 TABLET, FILM COATED ORAL EVERY 6 HOURS PRN
Qty: 30 TABLET | Refills: 0 | Status: SHIPPED | OUTPATIENT
Start: 2024-06-25

## 2024-07-28 PROBLEM — N83.201 CYST OF RIGHT OVARY: Status: RESOLVED | Noted: 2023-03-22 | Resolved: 2024-07-28

## 2024-07-28 PROBLEM — J06.9 UPPER RESPIRATORY INFECTION: Status: RESOLVED | Noted: 2024-03-01 | Resolved: 2024-07-28

## 2024-08-08 RX ORDER — ONDANSETRON 4 MG/1
4 TABLET, FILM COATED ORAL EVERY 6 HOURS PRN
Qty: 30 TABLET | Refills: 0 | Status: SHIPPED | OUTPATIENT
Start: 2024-08-08

## 2024-08-14 RX ORDER — FUROSEMIDE 40 MG/1
40 TABLET ORAL DAILY
Qty: 90 TABLET | Refills: 0 | Status: SHIPPED | OUTPATIENT
Start: 2024-08-14 | End: 2024-08-15

## 2024-08-15 RX ORDER — FUROSEMIDE 40 MG/1
40 TABLET ORAL DAILY
Qty: 90 TABLET | Refills: 0 | Status: SHIPPED | OUTPATIENT
Start: 2024-08-15

## 2024-08-15 RX ORDER — FLUOXETINE HYDROCHLORIDE 40 MG/1
40 CAPSULE ORAL DAILY
Qty: 90 CAPSULE | Refills: 0 | Status: SHIPPED | OUTPATIENT
Start: 2024-08-15

## 2024-08-27 ENCOUNTER — PRIOR AUTHORIZATION (OUTPATIENT)
Dept: FAMILY MEDICINE CLINIC | Facility: CLINIC | Age: 69
End: 2024-08-27
Payer: MEDICARE

## 2024-09-10 RX ORDER — DULAGLUTIDE 1.5 MG/.5ML
1.5 INJECTION, SOLUTION SUBCUTANEOUS WEEKLY
COMMUNITY

## 2024-09-16 ENCOUNTER — ANESTHESIA EVENT (OUTPATIENT)
Dept: GASTROENTEROLOGY | Facility: HOSPITAL | Age: 69
End: 2024-09-16
Payer: MEDICARE

## 2024-09-17 ENCOUNTER — ON CAMPUS - OUTPATIENT (AMBULATORY)
Age: 69
End: 2024-09-17
Payer: MEDICAID

## 2024-09-17 ENCOUNTER — ANESTHESIA (OUTPATIENT)
Dept: GASTROENTEROLOGY | Facility: HOSPITAL | Age: 69
End: 2024-09-17
Payer: MEDICARE

## 2024-09-17 ENCOUNTER — ON CAMPUS - OUTPATIENT (AMBULATORY)
Dept: URBAN - METROPOLITAN AREA HOSPITAL 85 | Facility: HOSPITAL | Age: 69
End: 2024-09-17
Payer: MEDICAID

## 2024-09-17 ENCOUNTER — HOSPITAL ENCOUNTER (OUTPATIENT)
Facility: HOSPITAL | Age: 69
Setting detail: HOSPITAL OUTPATIENT SURGERY
Discharge: HOME | End: 2024-09-17
Attending: INTERNAL MEDICINE | Admitting: INTERNAL MEDICINE
Payer: MEDICARE

## 2024-09-17 VITALS
WEIGHT: 158.6 LBS | HEIGHT: 60 IN | RESPIRATION RATE: 13 BRPM | OXYGEN SATURATION: 100 % | DIASTOLIC BLOOD PRESSURE: 74 MMHG | SYSTOLIC BLOOD PRESSURE: 130 MMHG | BODY MASS INDEX: 31.14 KG/M2 | TEMPERATURE: 98.8 F | HEART RATE: 65 BPM

## 2024-09-17 DIAGNOSIS — K22.2 ESOPHAGEAL OBSTRUCTION: ICD-10-CM

## 2024-09-17 DIAGNOSIS — K57.30 DIVERTICULOSIS OF LARGE INTESTINE WITHOUT PERFORATION OR ABS: ICD-10-CM

## 2024-09-17 DIAGNOSIS — Z12.11 ENCOUNTER FOR SCREENING FOR MALIGNANT NEOPLASM OF COLON: ICD-10-CM

## 2024-09-17 DIAGNOSIS — Z12.11 SCREEN FOR COLON CANCER: ICD-10-CM

## 2024-09-17 DIAGNOSIS — K31.89 OTHER DISEASES OF STOMACH AND DUODENUM: ICD-10-CM

## 2024-09-17 DIAGNOSIS — R11.0 NAUSEA: ICD-10-CM

## 2024-09-17 DIAGNOSIS — R10.12 LEFT UPPER QUADRANT PAIN: ICD-10-CM

## 2024-09-17 LAB — GLUCOSE BLDC GLUCOMTR-MCNC: 206 MG/DL (ref 70–105)

## 2024-09-17 PROCEDURE — 43239 EGD BIOPSY SINGLE/MULTIPLE: CPT | Performed by: INTERNAL MEDICINE

## 2024-09-17 PROCEDURE — 43450 DILATE ESOPHAGUS 1/MULT PASS: CPT | Performed by: INTERNAL MEDICINE

## 2024-09-17 PROCEDURE — 25810000003 SODIUM CHLORIDE 0.9 % SOLUTION: Performed by: NURSE ANESTHETIST, CERTIFIED REGISTERED

## 2024-09-17 PROCEDURE — 25010000002 PROPOFOL 500 MG/50ML EMULSION: Performed by: NURSE ANESTHETIST, CERTIFIED REGISTERED

## 2024-09-17 PROCEDURE — 45378 DIAGNOSTIC COLONOSCOPY: CPT | Mod: 33 | Performed by: INTERNAL MEDICINE

## 2024-09-17 PROCEDURE — 82948 REAGENT STRIP/BLOOD GLUCOSE: CPT

## 2024-09-17 PROCEDURE — 25810000003 SODIUM CHLORIDE 0.9 % SOLUTION: Performed by: INTERNAL MEDICINE

## 2024-09-17 PROCEDURE — 88305 TISSUE EXAM BY PATHOLOGIST: CPT | Performed by: INTERNAL MEDICINE

## 2024-09-17 RX ORDER — SODIUM CHLORIDE 9 MG/ML
INJECTION, SOLUTION INTRAVENOUS CONTINUOUS PRN
Status: DISCONTINUED | OUTPATIENT
Start: 2024-09-17 | End: 2024-09-17 | Stop reason: SURG

## 2024-09-17 RX ORDER — PROPOFOL 10 MG/ML
INJECTION, EMULSION INTRAVENOUS AS NEEDED
Status: DISCONTINUED | OUTPATIENT
Start: 2024-09-17 | End: 2024-09-17 | Stop reason: SURG

## 2024-09-17 RX ORDER — ONDANSETRON 2 MG/ML
4 INJECTION INTRAMUSCULAR; INTRAVENOUS ONCE AS NEEDED
Status: DISCONTINUED | OUTPATIENT
Start: 2024-09-17 | End: 2024-09-17 | Stop reason: HOSPADM

## 2024-09-17 RX ORDER — SODIUM CHLORIDE 9 MG/ML
50 INJECTION, SOLUTION INTRAVENOUS CONTINUOUS
Status: DISCONTINUED | OUTPATIENT
Start: 2024-09-17 | End: 2024-09-17 | Stop reason: HOSPADM

## 2024-09-17 RX ORDER — EPHEDRINE SULFATE 5 MG/ML
INJECTION INTRAVENOUS AS NEEDED
Status: DISCONTINUED | OUTPATIENT
Start: 2024-09-17 | End: 2024-09-17 | Stop reason: SURG

## 2024-09-17 RX ORDER — LIDOCAINE HYDROCHLORIDE 20 MG/ML
INJECTION, SOLUTION EPIDURAL; INFILTRATION; INTRACAUDAL; PERINEURAL AS NEEDED
Status: DISCONTINUED | OUTPATIENT
Start: 2024-09-17 | End: 2024-09-17 | Stop reason: SURG

## 2024-09-17 RX ADMIN — LIDOCAINE HYDROCHLORIDE 100 MG: 20 INJECTION, SOLUTION EPIDURAL; INFILTRATION; INTRACAUDAL; PERINEURAL at 09:08

## 2024-09-17 RX ADMIN — PROPOFOL 20 MG: 10 INJECTION, EMULSION INTRAVENOUS at 09:27

## 2024-09-17 RX ADMIN — PROPOFOL 50 MG: 10 INJECTION, EMULSION INTRAVENOUS at 09:10

## 2024-09-17 RX ADMIN — PROPOFOL 30 MG: 10 INJECTION, EMULSION INTRAVENOUS at 09:19

## 2024-09-17 RX ADMIN — EPHEDRINE SULFATE 10 MG: 5 INJECTION INTRAVENOUS at 09:24

## 2024-09-17 RX ADMIN — SODIUM CHLORIDE 50 ML/HR: 9 INJECTION, SOLUTION INTRAVENOUS at 09:01

## 2024-09-17 RX ADMIN — PROPOFOL 150 MG: 10 INJECTION, EMULSION INTRAVENOUS at 09:08

## 2024-09-17 RX ADMIN — EPHEDRINE SULFATE 10 MG: 5 INJECTION INTRAVENOUS at 09:25

## 2024-09-17 RX ADMIN — PROPOFOL 30 MG: 10 INJECTION, EMULSION INTRAVENOUS at 09:30

## 2024-09-17 RX ADMIN — SODIUM CHLORIDE: 9 INJECTION, SOLUTION INTRAVENOUS at 09:05

## 2024-09-17 RX ADMIN — EPHEDRINE SULFATE 5 MG: 5 INJECTION INTRAVENOUS at 09:30

## 2024-09-17 RX ADMIN — PROPOFOL 30 MG: 10 INJECTION, EMULSION INTRAVENOUS at 09:14

## 2024-09-17 RX ADMIN — PROPOFOL 30 MG: 10 INJECTION, EMULSION INTRAVENOUS at 09:16

## 2024-09-18 LAB
LAB AP CASE REPORT: NORMAL
PATH REPORT.FINAL DX SPEC: NORMAL
PATH REPORT.GROSS SPEC: NORMAL

## 2024-09-24 RX ORDER — ONDANSETRON 4 MG/1
4 TABLET, FILM COATED ORAL EVERY 6 HOURS PRN
Qty: 30 TABLET | Refills: 0 | OUTPATIENT
Start: 2024-09-24

## 2024-09-25 RX ORDER — GLIPIZIDE 5 MG/1
TABLET ORAL
Qty: 180 TABLET | Refills: 1 | Status: SHIPPED | OUTPATIENT
Start: 2024-09-25

## 2024-09-26 ENCOUNTER — EXTERNAL PBMM DATA (OUTPATIENT)
Dept: PHARMACY | Facility: OTHER | Age: 69
End: 2024-09-26
Payer: MEDICARE

## 2024-09-26 RX ORDER — ALIROCUMAB 75 MG/ML
INJECTION, SOLUTION SUBCUTANEOUS
Qty: 2 ML | Refills: 3 | Status: SHIPPED | OUTPATIENT
Start: 2024-09-26

## 2024-09-30 ENCOUNTER — OFFICE VISIT (OUTPATIENT)
Dept: FAMILY MEDICINE CLINIC | Facility: CLINIC | Age: 69
End: 2024-09-30
Payer: MEDICARE

## 2024-09-30 VITALS
DIASTOLIC BLOOD PRESSURE: 80 MMHG | OXYGEN SATURATION: 96 % | WEIGHT: 163.4 LBS | HEIGHT: 60 IN | TEMPERATURE: 97 F | BODY MASS INDEX: 32.08 KG/M2 | HEART RATE: 78 BPM | SYSTOLIC BLOOD PRESSURE: 122 MMHG

## 2024-09-30 DIAGNOSIS — R05.3 CHRONIC COUGH: ICD-10-CM

## 2024-09-30 DIAGNOSIS — R07.89 CHEST TIGHTNESS: ICD-10-CM

## 2024-09-30 DIAGNOSIS — I10 BENIGN ESSENTIAL HTN: ICD-10-CM

## 2024-09-30 DIAGNOSIS — E55.9 VITAMIN D DEFICIENCY: ICD-10-CM

## 2024-09-30 DIAGNOSIS — E66.811 CLASS 1 OBESITY DUE TO EXCESS CALORIES WITH SERIOUS COMORBIDITY AND BODY MASS INDEX (BMI) OF 31.0 TO 31.9 IN ADULT: ICD-10-CM

## 2024-09-30 DIAGNOSIS — E66.09 CLASS 1 OBESITY DUE TO EXCESS CALORIES WITH SERIOUS COMORBIDITY AND BODY MASS INDEX (BMI) OF 31.0 TO 31.9 IN ADULT: ICD-10-CM

## 2024-09-30 DIAGNOSIS — E11.22 TYPE 2 DIABETES MELLITUS WITH STAGE 2 CHRONIC KIDNEY DISEASE, WITHOUT LONG-TERM CURRENT USE OF INSULIN: Primary | ICD-10-CM

## 2024-09-30 DIAGNOSIS — N18.2 TYPE 2 DIABETES MELLITUS WITH STAGE 2 CHRONIC KIDNEY DISEASE, WITHOUT LONG-TERM CURRENT USE OF INSULIN: Primary | ICD-10-CM

## 2024-09-30 DIAGNOSIS — G45.9 TRANSIENT CEREBRAL ISCHEMIA, UNSPECIFIED TYPE: ICD-10-CM

## 2024-09-30 DIAGNOSIS — F32.A DEPRESSION, UNSPECIFIED DEPRESSION TYPE: ICD-10-CM

## 2024-09-30 DIAGNOSIS — G43.109 MIGRAINE WITH AURA AND WITHOUT STATUS MIGRAINOSUS, NOT INTRACTABLE: ICD-10-CM

## 2024-09-30 DIAGNOSIS — E78.5 HYPERLIPIDEMIA, UNSPECIFIED HYPERLIPIDEMIA TYPE: ICD-10-CM

## 2024-09-30 DIAGNOSIS — R06.09 DOE (DYSPNEA ON EXERTION): ICD-10-CM

## 2024-09-30 PROBLEM — Z98.2 PRESENCE OF CEREBROSPINAL FLUID DRAINAGE DEVICE: Status: ACTIVE | Noted: 2024-07-23

## 2024-09-30 PROBLEM — G91.9: Status: ACTIVE | Noted: 2024-07-23

## 2024-09-30 PROBLEM — R53.1 WEAKNESS: Status: RESOLVED | Noted: 2022-12-05 | Resolved: 2024-09-30

## 2024-09-30 PROCEDURE — 99214 OFFICE O/P EST MOD 30 MIN: CPT | Performed by: PREVENTIVE MEDICINE

## 2024-09-30 PROCEDURE — 3079F DIAST BP 80-89 MM HG: CPT | Performed by: PREVENTIVE MEDICINE

## 2024-09-30 PROCEDURE — 3044F HG A1C LEVEL LT 7.0%: CPT | Performed by: PREVENTIVE MEDICINE

## 2024-09-30 PROCEDURE — 1159F MED LIST DOCD IN RCRD: CPT | Performed by: PREVENTIVE MEDICINE

## 2024-09-30 PROCEDURE — 91320 SARSCV2 VAC 30MCG TRS-SUC IM: CPT | Performed by: PREVENTIVE MEDICINE

## 2024-09-30 PROCEDURE — 90480 ADMN SARSCOV2 VAC 1/ONLY CMP: CPT | Performed by: PREVENTIVE MEDICINE

## 2024-09-30 PROCEDURE — 1160F RVW MEDS BY RX/DR IN RCRD: CPT | Performed by: PREVENTIVE MEDICINE

## 2024-09-30 PROCEDURE — 1126F AMNT PAIN NOTED NONE PRSNT: CPT | Performed by: PREVENTIVE MEDICINE

## 2024-09-30 PROCEDURE — 3074F SYST BP LT 130 MM HG: CPT | Performed by: PREVENTIVE MEDICINE

## 2024-09-30 PROCEDURE — 90662 IIV NO PRSV INCREASED AG IM: CPT | Performed by: PREVENTIVE MEDICINE

## 2024-09-30 PROCEDURE — G0008 ADMIN INFLUENZA VIRUS VAC: HCPCS | Performed by: PREVENTIVE MEDICINE

## 2024-09-30 RX ORDER — DULAGLUTIDE 1.5 MG/.5ML
INJECTION, SOLUTION SUBCUTANEOUS
Qty: 6 ML | Refills: 1 | Status: SHIPPED | OUTPATIENT
Start: 2024-09-30

## 2024-09-30 RX ORDER — AMLODIPINE BESYLATE 10 MG/1
10 TABLET ORAL DAILY
Qty: 90 TABLET | Refills: 3 | Status: SHIPPED | OUTPATIENT
Start: 2024-09-30

## 2024-09-30 RX ORDER — ONDANSETRON 4 MG/1
4 TABLET, FILM COATED ORAL EVERY 6 HOURS PRN
Qty: 30 TABLET | Refills: 0 | Status: SHIPPED | OUTPATIENT
Start: 2024-09-30

## 2024-09-30 RX ORDER — BUSPIRONE HYDROCHLORIDE 7.5 MG/1
7.5 TABLET ORAL 2 TIMES DAILY
Qty: 60 TABLET | Refills: 1 | Status: SHIPPED | OUTPATIENT
Start: 2024-09-30

## 2024-09-30 RX ORDER — AMLODIPINE BESYLATE 5 MG/1
5 TABLET ORAL DAILY
Qty: 90 TABLET | Refills: 1 | Status: CANCELLED | OUTPATIENT
Start: 2024-09-30

## 2024-09-30 RX ORDER — DULAGLUTIDE 1.5 MG/.5ML
1.5 INJECTION, SOLUTION SUBCUTANEOUS WEEKLY
Qty: 6 ML | Refills: 3 | Status: SHIPPED | OUTPATIENT
Start: 2024-09-30

## 2024-09-30 NOTE — PATIENT INSTRUCTIONS
Health Maintenance Due   Topic Date Due    COVID-19 Vaccine (6 - 2023-24 season) 09/01/2024    HEMOGLOBIN A1C  09/05/2024    INFLUENZA VACCINE  08/01/2024    Decrease Losartan to 50 mg daily for one week and then 25 mg for 2 weeks and then off.  Monitort BP daily and call after off Losartan in 2 weeka

## 2024-09-30 NOTE — TELEPHONE ENCOUNTER
Rx Refill Note  Requested Prescriptions     Pending Prescriptions Disp Refills    Trulicity 1.5 MG/0.5ML solution pen-injector [Pharmacy Med Name: TRULICITY 1.5MG/0.5ML INJ (4 PENS)] 4 mL      Sig: ADMINISTER 1.5 MG UNDER THE SKIN 1 TIME EVERY WEEK AS DIRECTED      Last office visit with prescribing clinician: 4/30/2024   Last telemedicine visit with prescribing clinician: Visit date not found   Next office visit with prescribing clinician: 9/30/2024                         Would you like a call back once the refill request has been completed: [] Yes [] No    If the office needs to give you a call back, can they leave a voicemail: [] Yes [] No    Rosa Maria Chlids MA  09/30/24, 09:44 EDT    
no

## 2024-09-30 NOTE — PROGRESS NOTES
Subjective   Odalis Solo is a 69 y.o. female presents for   Chief Complaint   Patient presents with    Hypertension     Needing refills   Patient is not fasting  Patient would like flu shot     Cough     For over a year       Health Maintenance Due   Topic Date Due    HEMOGLOBIN A1C  09/05/2024       Hypertension    Cough       History of Present Illness  The patient is a 69-year-old female who presents today for follow-up on type 2 diabetes with stage 2 chronic kidney disease, TIA, unspecified migraine, hyperlipidemia, dyspnea on exertion, depression, class 1 obesity due to excess calories with serious comorbidities, chest tightness, benign essential hypertension, vitamin D deficiency, and chronic cough.    She has been experiencing a persistent cough for a year, which has recently led to hoarseness. The cough is particularly noticeable at night and when she eats, causing her to choke. Despite having her esophagus checked during a recent colonoscopy, the cause of the cough remains unclear. She has tried Tessalon Perles without success. She also reports constant phlegm production, which she attributes to allergies or weather changes. She had bronchitis over a year ago and COVID-19, but does not believe these are related to her current symptoms. She reports no chest pressure or heart flutters. Her last chest x-ray was in November 2023. She took Benadryl this morning and plans to get a flu shot today. She reports no fever. She did not receive a COVID-19 vaccine last year due to an RSV infection. She experiences severe coughing spells that cause dizziness and wheezing, which she believes is due to drainage rather than asthma. She produces a significant amount of spit when she coughs.    She has not seen a dentist in the past year but has visited an eye doctor. She has upper and lower dentures, but they do not fit well. She does not see a cardiologist. She had issues with Norvasc in the past and is currently  "taking amlodipine, which she needs refills for. She has not had a mammogram. Her blood sugar was 202 during her colonoscopy last week, but it is usually around 130 at home. She has been on Trulicity 1.5 for about 2 years. She is up to date on her eye exams and needs to schedule an appointment with her nephrologist. Her shunt was checked a couple of months ago.    She continues to struggle with depression and anxiety. She takes Prozac 40 mg every morning. She does not experience panic attacks daily, but does have days where she feels a sense of doom and has to force herself to get up and get dressed. She was previously started on Vraylar, but it made her feel more depressed and scared. She has recently started a sugar detox due to weight gain, which she believes is partly due to her depression. She has lost interest in activities she used to enjoy, such as swimming.    She continues to experience nausea. Her colonoscopy was normal and her esophagus was stretched. She tested negative for H. pylori. She is unsure if the nausea is due to her shunt draining into her stomach or sinus drainage. Some days she feels fine, while other days she experiences extreme nausea, sometimes after eating. She admits her diet has not been good lately. She does not take Zofran daily, but took Phenergan or promethazine last week due to severe nausea.    SOCIAL HISTORY  She used to smoke.    ALLERGIES  She is allergic to SULFA.    IMMUNIZATIONS  She got RSV vaccine.    Vitals:    09/30/24 1000 09/30/24 1001 09/30/24 1002   BP: 136/84 133/86 122/80   BP Location: Left arm Right arm Right arm   Patient Position: Sitting Sitting Standing   Cuff Size: Adult Adult Adult   Pulse: 84 78 78   Temp: 97 °F (36.1 °C)     TempSrc: Temporal     SpO2: 96%     Weight: 74.1 kg (163 lb 6.4 oz)     Height: 152.4 cm (60\")       Body mass index is 31.91 kg/m².    Current Outpatient Medications on File Prior to Visit   Medication Sig Dispense Refill    aspirin " 81 MG EC tablet Take 1 tablet by mouth Daily.      betamethasone valerate (VALISONE) 0.1 % cream Apply 1 Application topically to the appropriate area as directed 2 (Two) Times a Day. 45 g 0    FLUoxetine (PROzac) 40 MG capsule TAKE 1 CAPSULE BY MOUTH DAILY 90 capsule 0    furosemide (LASIX) 40 MG tablet TAKE 1 TABLET BY MOUTH DAILY FOR HIGH BLOOD PRESSURE 90 tablet 0    gabapentin (NEURONTIN) 600 MG tablet TAKE 1 TABLET BY MOUTH TWICE DAILY 180 tablet 1    glipizide (GLUCOTROL) 5 MG tablet TAKE 1 TABLET BY MOUTH TWICE DAILY 180 tablet 1    hydrOXYzine (ATARAX) 25 MG tablet Take 1 tablet by mouth 3 (Three) Times a Day As Needed for Itching. 30 tablet 0    losartan (Cozaar) 50 MG tablet Take 1 tablet by mouth 2 (Two) Times a Day. 180 tablet 1    metoprolol tartrate (LOPRESSOR) 100 MG tablet TAKE 1 TABLET BY MOUTH TWICE DAILY 180 tablet 3    Misc. Devices (Pulse Oximeter For Finger) misc Use 1 each 4 (Four) Times a Day. 1 each 0    Praluent 75 MG/ML solution auto-injector ADMINISTER 1 ML UNDER THE SKIN EVERY 14 DAYS AS DIRECTED 2 mL 3    promethazine (PHENERGAN) 25 MG tablet Take 1 tablet by mouth Every 8 (Eight) Hours As Needed for Nausea or Vomiting. 30 tablet 0    vitamin B-12 (CYANOCOBALAMIN) 1000 MCG tablet Take 1 tablet by mouth Daily.      [DISCONTINUED] amLODIPine (NORVASC) 5 MG tablet Take 1 tablet by mouth Daily. Indications: High Blood Pressure      [DISCONTINUED] Dulaglutide (Trulicity) 1.5 MG/0.5ML solution pen-injector Inject 1.5 mg under the skin into the appropriate area as directed 1 (One) Time Per Week.      [DISCONTINUED] ondansetron (ZOFRAN) 4 MG tablet TAKE 1 TABLET BY MOUTH EVERY 6 HOURS AS NEEDED FOR NAUSEA OR VOMITING 30 tablet 0     No current facility-administered medications on file prior to visit.       The following portions of the patient's history were reviewed and updated as appropriate: allergies, current medications, past family history, past medical history, past social history, past  surgical history, and problem list.    Review of Systems   Respiratory:  Positive for cough. Negative for chest tightness.    Neurological:  Negative for headache.   Psychiatric/Behavioral:  Positive for depressed mood.        Objective   Physical Exam  Vitals reviewed.   Constitutional:       General: She is not in acute distress.     Appearance: She is well-developed. She is obese. She is not ill-appearing or toxic-appearing.   HENT:      Head: Normocephalic and atraumatic.      Right Ear: Tympanic membrane, ear canal and external ear normal.      Left Ear: Tympanic membrane, ear canal and external ear normal.      Nose: Nose normal.      Mouth/Throat:      Mouth: Mucous membranes are moist.      Pharynx: No posterior oropharyngeal erythema.   Eyes:      Extraocular Movements: Extraocular movements intact.      Conjunctiva/sclera: Conjunctivae normal.      Pupils: Pupils are equal, round, and reactive to light.   Neck:      Vascular: No carotid bruit.   Cardiovascular:      Rate and Rhythm: Normal rate and regular rhythm.      Heart sounds: Normal heart sounds.   Pulmonary:      Effort: Pulmonary effort is normal.      Comments: Decreased breath sounds bilaterally  Abdominal:      General: Bowel sounds are normal. There is no distension.      Palpations: Abdomen is soft. There is no mass.      Tenderness: There is no abdominal tenderness.   Musculoskeletal:      Cervical back: Neck supple. No tenderness.      Right lower leg: No edema.      Left lower leg: No edema.   Lymphadenopathy:      Cervical: No cervical adenopathy.   Skin:     General: Skin is warm.   Neurological:      General: No focal deficit present.      Mental Status: She is alert and oriented to person, place, and time.   Psychiatric:         Mood and Affect: Mood normal.         Behavior: Behavior normal.       Physical Exam  Throat appears normal.  Carotid arteries sound normal.  Lungs sound normal.    PHQ-9 Total Score:    Results  Laboratory  Studies  Blood sugar was 202 during colonoscopy. Home blood sugar readings are around 130.    Imaging  Chest X-ray from November 2023 was normal.    Testing  H. pylori test was negative.         Assessment & Plan   Diagnoses and all orders for this visit:    1. Type 2 diabetes mellitus with stage 2 chronic kidney disease, without long-term current use of insulin (Primary)  -     Comprehensive Metabolic Panel; Future  -     Hemoglobin A1c; Future  -     Microalbumin / Creatinine Urine Ratio - Urine, Clean Catch; Future    2. Transient cerebral ischemia, unspecified type    3. Migraine with aura and without status migrainosus, not intractable    4. Hyperlipidemia, unspecified hyperlipidemia type  -     Lipid Panel; Future    5. MONGE (dyspnea on exertion)    6. Depression, unspecified depression type  -     Magnesium; Future  -     TSH Rfx On Abnormal To Free T4; Future  -     Vitamin B12; Future    7. Class 1 obesity due to excess calories with serious comorbidity and body mass index (BMI) of 31.0 to 31.9 in adult    8. Chest tightness    9. Benign essential HTN  -     CBC Auto Differential; Future    10. Vitamin D deficiency  -     Vitamin D,25-Hydroxy; Future    11. Chronic cough  -     CT Chest Without Contrast Diagnostic  -     Ambulatory Referral to Allergy  -     Ambulatory Referral to Pulmonology    Other orders  -     COVID-19 (Pfizer) 12yrs+ (COMIRNATY)  -     Fluzone High-Dose 65+yrs (5788-6000)  -     ondansetron (ZOFRAN) 4 MG tablet; Take 1 tablet by mouth Every 6 (Six) Hours As Needed for Nausea or Vomiting.  Dispense: 30 tablet; Refill: 0  -     Dulaglutide (Trulicity) 1.5 MG/0.5ML solution pen-injector; Inject 1.5 mg under the skin into the appropriate area as directed 1 (One) Time Per Week.  Dispense: 6 mL; Refill: 3  -     amLODIPine (NORVASC) 10 MG tablet; Take 1 tablet by mouth Daily.  Dispense: 90 tablet; Refill: 3  -     busPIRone (BUSPAR) 7.5 MG tablet; Take 1 tablet by mouth 2 (Two) Times a Day.   Dispense: 60 tablet; Refill: 1      Assessment & Plan  1. Chronic cough.  Her previous chest x-ray in November 2023 was normal despite presenting with cough, congestion, and right rib pain for a week. A CT scan of the chest will be ordered. Both influenza and COVID-19 vaccines will be administered today. A referral to an allergist and a pulmonologist will be made. Losartan will be decreased to 50 mg daily for 1 week, then 25 mg for 2 weeks before discontinuation. Amlodipine will be increased to 10 mg.    2. Type 2 diabetes.  Her blood sugar levels at home are usually around 130, but it was 202 during her recent colonoscopy. She will continue on Trulicity 1.5 mg, with a refill for six doses and three refills.    3. Depression and anxiety.  She experiences occasional panic and doom feelings despite being on Prozac 40 mg daily. Buspirone 7.5 mg will be prescribed, to be taken twice daily as needed, with a total of 60 tablets provided.    4. Nausea.  A prescription for Zofran (30 tablets) will be provided, with instructions to use them sparingly.    5. Health Maintenance.  Both influenza and COVID-19 vaccines will be administered today. She was advised to maintain regular dental check-ups for optimal oral health.        Patient Instructions     Health Maintenance Due   Topic Date Due    COVID-19 Vaccine (6 - 2023-24 season) 09/01/2024    HEMOGLOBIN A1C  09/05/2024    INFLUENZA VACCINE  08/01/2024    Decrease Losartan to 50 mg daily for one week and then 25 mg for 2 weeks and then off.  Monitort BP daily and call after off Losartan in 2 weeka       Patient or patient representative verbalized consent for the use of Ambient Listening during the visit with  Aubree Alsa MD for chart documentation. 9/30/2024  13:00 EDT

## 2024-10-09 ENCOUNTER — HOSPITAL ENCOUNTER (OUTPATIENT)
Dept: CT IMAGING | Facility: HOSPITAL | Age: 69
Discharge: HOME | End: 2024-10-09
Admitting: PREVENTIVE MEDICINE
Payer: MEDICARE

## 2024-10-09 PROCEDURE — 71250 CT THORAX DX C-: CPT

## 2024-10-11 ENCOUNTER — TELEPHONE (OUTPATIENT)
Dept: FAMILY MEDICINE CLINIC | Facility: CLINIC | Age: 69
End: 2024-10-11
Payer: MEDICARE

## 2024-10-11 NOTE — TELEPHONE ENCOUNTER
"HUB TO RELAY    \"Chest CT shows no abnormalities so keep your follow-up with Dr. Nunez and let us know what he says about your chronic cough\"  "

## 2024-10-28 ENCOUNTER — POP HEALTH PHARMACY (OUTPATIENT)
Dept: PHARMACY | Facility: OTHER | Age: 69
End: 2024-10-28
Payer: MEDICARE

## 2024-11-05 RX ORDER — GABAPENTIN 600 MG/1
600 TABLET ORAL 2 TIMES DAILY
Qty: 180 TABLET | Refills: 1 | Status: SHIPPED | OUTPATIENT
Start: 2024-11-05

## 2024-11-05 NOTE — TELEPHONE ENCOUNTER
Rx Refill Note  Requested Prescriptions     Pending Prescriptions Disp Refills   • gabapentin (NEURONTIN) 600 MG tablet [Pharmacy Med Name: GABAPENTIN 600MG TABLETS] 180 tablet 1     Sig: TAKE 1 TABLET BY MOUTH TWICE DAILY      Last office visit with prescribing clinician: 9/30/2024   Last telemedicine visit with prescribing clinician: Visit date not found   Next office visit with prescribing clinician: 12/30/2024       Would you like a call back once the refill request has been completed: [] Yes [] No    If the office needs to give you a call back, can they leave a voicemail: [] Yes [] No    Erika Silva MA  11/05/24, 08:21 EST

## 2024-11-06 ENCOUNTER — LAB (OUTPATIENT)
Dept: FAMILY MEDICINE CLINIC | Facility: CLINIC | Age: 69
End: 2024-11-06
Payer: MEDICARE

## 2024-11-06 DIAGNOSIS — E55.9 VITAMIN D DEFICIENCY: ICD-10-CM

## 2024-11-06 DIAGNOSIS — I10 BENIGN ESSENTIAL HTN: ICD-10-CM

## 2024-11-06 DIAGNOSIS — E78.5 HYPERLIPIDEMIA, UNSPECIFIED HYPERLIPIDEMIA TYPE: ICD-10-CM

## 2024-11-06 DIAGNOSIS — E11.22 TYPE 2 DIABETES MELLITUS WITH STAGE 2 CHRONIC KIDNEY DISEASE, WITHOUT LONG-TERM CURRENT USE OF INSULIN: ICD-10-CM

## 2024-11-06 DIAGNOSIS — N18.2 TYPE 2 DIABETES MELLITUS WITH STAGE 2 CHRONIC KIDNEY DISEASE, WITHOUT LONG-TERM CURRENT USE OF INSULIN: ICD-10-CM

## 2024-11-06 DIAGNOSIS — F32.A DEPRESSION, UNSPECIFIED DEPRESSION TYPE: ICD-10-CM

## 2024-11-06 LAB
25(OH)D3 SERPL-MCNC: 32.8 NG/ML (ref 30–100)
ALBUMIN SERPL-MCNC: 4.4 G/DL (ref 3.5–5.2)
ALBUMIN UR-MCNC: <1.2 MG/DL
ALBUMIN/GLOB SERPL: 2 G/DL
ALP SERPL-CCNC: 112 U/L (ref 39–117)
ALT SERPL W P-5'-P-CCNC: 18 U/L (ref 1–33)
ANION GAP SERPL CALCULATED.3IONS-SCNC: 11 MMOL/L (ref 5–15)
AST SERPL-CCNC: 16 U/L (ref 1–32)
BASOPHILS # BLD AUTO: 0.03 10*3/MM3 (ref 0–0.2)
BASOPHILS NFR BLD AUTO: 0.5 % (ref 0–1.5)
BILIRUB SERPL-MCNC: 0.4 MG/DL (ref 0–1.2)
BUN SERPL-MCNC: 9 MG/DL (ref 8–23)
BUN/CREAT SERPL: 10.2 (ref 7–25)
CALCIUM SPEC-SCNC: 9.3 MG/DL (ref 8.6–10.5)
CHLORIDE SERPL-SCNC: 104 MMOL/L (ref 98–107)
CHOLEST SERPL-MCNC: 148 MG/DL (ref 0–200)
CO2 SERPL-SCNC: 27 MMOL/L (ref 22–29)
CREAT SERPL-MCNC: 0.88 MG/DL (ref 0.57–1)
CREAT UR-MCNC: 26.5 MG/DL
DEPRECATED RDW RBC AUTO: 45.2 FL (ref 37–54)
EGFRCR SERPLBLD CKD-EPI 2021: 71.7 ML/MIN/1.73
EOSINOPHIL # BLD AUTO: 0.11 10*3/MM3 (ref 0–0.4)
EOSINOPHIL NFR BLD AUTO: 1.8 % (ref 0.3–6.2)
ERYTHROCYTE [DISTWIDTH] IN BLOOD BY AUTOMATED COUNT: 13.1 % (ref 12.3–15.4)
GLOBULIN UR ELPH-MCNC: 2.2 GM/DL
GLUCOSE SERPL-MCNC: 111 MG/DL (ref 65–99)
HBA1C MFR BLD: 5.8 % (ref 4.8–5.6)
HCT VFR BLD AUTO: 36.2 % (ref 34–46.6)
HDLC SERPL-MCNC: 39 MG/DL (ref 40–60)
HGB BLD-MCNC: 12.1 G/DL (ref 12–15.9)
IMM GRANULOCYTES # BLD AUTO: 0.03 10*3/MM3 (ref 0–0.05)
IMM GRANULOCYTES NFR BLD AUTO: 0.5 % (ref 0–0.5)
LDLC SERPL CALC-MCNC: 56 MG/DL (ref 0–100)
LDLC/HDLC SERPL: 1.04 {RATIO}
LYMPHOCYTES # BLD AUTO: 1.23 10*3/MM3 (ref 0.7–3.1)
LYMPHOCYTES NFR BLD AUTO: 20 % (ref 19.6–45.3)
MAGNESIUM SERPL-MCNC: 2 MG/DL (ref 1.6–2.4)
MCH RBC QN AUTO: 31.6 PG (ref 26.6–33)
MCHC RBC AUTO-ENTMCNC: 33.4 G/DL (ref 31.5–35.7)
MCV RBC AUTO: 94.5 FL (ref 79–97)
MICROALBUMIN/CREAT UR: NORMAL MG/G{CREAT}
MONOCYTES # BLD AUTO: 0.48 10*3/MM3 (ref 0.1–0.9)
MONOCYTES NFR BLD AUTO: 7.8 % (ref 5–12)
NEUTROPHILS NFR BLD AUTO: 4.26 10*3/MM3 (ref 1.7–7)
NEUTROPHILS NFR BLD AUTO: 69.4 % (ref 42.7–76)
NRBC BLD AUTO-RTO: 0 /100 WBC (ref 0–0.2)
PLATELET # BLD AUTO: 200 10*3/MM3 (ref 140–450)
PMV BLD AUTO: 10.8 FL (ref 6–12)
POTASSIUM SERPL-SCNC: 5.1 MMOL/L (ref 3.5–5.2)
PROT SERPL-MCNC: 6.6 G/DL (ref 6–8.5)
RBC # BLD AUTO: 3.83 10*6/MM3 (ref 3.77–5.28)
SODIUM SERPL-SCNC: 142 MMOL/L (ref 136–145)
TRIGL SERPL-MCNC: 342 MG/DL (ref 0–150)
TSH SERPL DL<=0.05 MIU/L-ACNC: 2.04 UIU/ML (ref 0.27–4.2)
VIT B12 BLD-MCNC: 282 PG/ML (ref 211–946)
VLDLC SERPL-MCNC: 53 MG/DL (ref 5–40)
WBC NRBC COR # BLD AUTO: 6.14 10*3/MM3 (ref 3.4–10.8)

## 2024-11-06 PROCEDURE — 82306 VITAMIN D 25 HYDROXY: CPT | Performed by: PREVENTIVE MEDICINE

## 2024-11-06 PROCEDURE — 36415 COLL VENOUS BLD VENIPUNCTURE: CPT

## 2024-11-06 PROCEDURE — 83036 HEMOGLOBIN GLYCOSYLATED A1C: CPT | Performed by: PREVENTIVE MEDICINE

## 2024-11-06 PROCEDURE — 85025 COMPLETE CBC W/AUTO DIFF WBC: CPT | Performed by: PREVENTIVE MEDICINE

## 2024-11-06 PROCEDURE — 82570 ASSAY OF URINE CREATININE: CPT | Performed by: PREVENTIVE MEDICINE

## 2024-11-06 PROCEDURE — 84443 ASSAY THYROID STIM HORMONE: CPT | Performed by: PREVENTIVE MEDICINE

## 2024-11-06 PROCEDURE — 80061 LIPID PANEL: CPT | Performed by: PREVENTIVE MEDICINE

## 2024-11-06 PROCEDURE — 82607 VITAMIN B-12: CPT | Performed by: PREVENTIVE MEDICINE

## 2024-11-06 PROCEDURE — 80053 COMPREHEN METABOLIC PANEL: CPT | Performed by: PREVENTIVE MEDICINE

## 2024-11-06 PROCEDURE — 82043 UR ALBUMIN QUANTITATIVE: CPT | Performed by: PREVENTIVE MEDICINE

## 2024-11-06 PROCEDURE — 83735 ASSAY OF MAGNESIUM: CPT | Performed by: PREVENTIVE MEDICINE

## 2024-11-20 ENCOUNTER — EXTERNAL PBMM DATA (OUTPATIENT)
Dept: PHARMACY | Facility: OTHER | Age: 69
End: 2024-11-20
Payer: MEDICARE

## 2024-12-04 ENCOUNTER — POP HEALTH PHARMACY (OUTPATIENT)
Dept: PHARMACY | Facility: OTHER | Age: 69
End: 2024-12-04
Payer: MEDICARE

## 2024-12-17 RX ORDER — ONDANSETRON 4 MG/1
4 TABLET, FILM COATED ORAL EVERY 6 HOURS PRN
Qty: 30 TABLET | Refills: 0 | Status: SHIPPED | OUTPATIENT
Start: 2024-12-17

## 2024-12-27 PROBLEM — R10.30 LOWER ABDOMINAL PAIN, UNSPECIFIED: Status: RESOLVED | Noted: 2023-03-23 | Resolved: 2024-12-27

## 2024-12-27 PROBLEM — R19.7 DIARRHEA: Status: RESOLVED | Noted: 2023-03-23 | Resolved: 2024-12-27

## 2024-12-27 PROBLEM — K57.92 DIVERTICULITIS: Status: RESOLVED | Noted: 2023-03-23 | Resolved: 2024-12-27

## 2024-12-30 ENCOUNTER — TELEPHONE (OUTPATIENT)
Dept: FAMILY MEDICINE CLINIC | Facility: CLINIC | Age: 69
End: 2024-12-30

## 2024-12-30 NOTE — TELEPHONE ENCOUNTER
Caller: Odalis Soloeen    Relationship: Self    Best call back number:     What medication are you requesting: [id number]    What are your current symptoms: VOMITING AND DIARRHEA    How long have you been experiencing symptoms: 2 DAYS    Have you had these symptoms before:    [x] Yes  [] No    Have you been treated for these symptoms before:   [x] Yes  [] No    If a prescription is needed, what is your preferred pharmacy and phone number: [id number] - [address] AT [address] & [address] [phone] [facility] [phone]      Additional notes: PATIENT SAYS SHE HAS HAD THE SYMPTOMS LISTED ABOVE AND IS ASKING FOR A MEDICATION TO BE CALLED IN FOR HER.

## 2025-02-10 RX ORDER — ONDANSETRON 4 MG/1
4 TABLET, FILM COATED ORAL EVERY 6 HOURS PRN
Qty: 30 TABLET | Refills: 0 | Status: SHIPPED | OUTPATIENT
Start: 2025-02-10

## 2025-02-10 RX ORDER — FUROSEMIDE 40 MG/1
40 TABLET ORAL DAILY
Qty: 90 TABLET | Refills: 0 | Status: SHIPPED | OUTPATIENT
Start: 2025-02-10

## 2025-02-10 RX ORDER — FLUOXETINE 40 MG/1
40 CAPSULE ORAL DAILY
Qty: 90 CAPSULE | Refills: 0 | Status: SHIPPED | OUTPATIENT
Start: 2025-02-10

## 2025-02-13 RX ORDER — ALIROCUMAB 75 MG/ML
INJECTION, SOLUTION SUBCUTANEOUS
Qty: 2 ML | Refills: 3 | Status: SHIPPED | OUTPATIENT
Start: 2025-02-13

## 2025-02-13 NOTE — TELEPHONE ENCOUNTER
Rx Refill Note  Requested Prescriptions     Pending Prescriptions Disp Refills   • Praluent 75 MG/ML solution auto-injector [Pharmacy Med Name: PRALUENT 75MG/ML PF PEN INJ 2X1ML] 2 mL 3     Sig: ADMINISTER 1 ML UNDER THE SKIN EVERY 14 DAYS AS DIRECTED      Last office visit with prescribing clinician: 9/30/2024   Last telemedicine visit with prescribing clinician: Visit date not found   Next office visit with prescribing clinician: Visit date not found       Would you like a call back once the refill request has been completed: [] Yes [] No    If the office needs to give you a call back, can they leave a voicemail: [] Yes [] No    Erika Silva MA  02/13/25, 08:16 EST

## 2025-02-27 ENCOUNTER — PATIENT ROUNDING (BHMG ONLY) (OUTPATIENT)
Dept: URGENT CARE | Facility: CLINIC | Age: 70
End: 2025-02-27
Payer: MEDICARE

## 2025-02-27 NOTE — ED NOTES
Thank you for letting us care for you in your recent visit to our urgent care center. We would love to hear about your experience with us. Was this the first time you have visited our location?    We’re always looking for ways to make our patients’ experiences even better. Do you have any recommendations on ways we may improve?     I appreciate you taking the time to respond. Please be on the lookout for a survey about your recent visit from [employer] via text or email. We would greatly appreciate if you could fill that out and turn it back in. We want your voice to be heard and we value your feedback.   Thank you for choosing [facility] for your healthcare needs.     [facility] Practice Manager

## 2025-03-04 ENCOUNTER — HOSPITAL ENCOUNTER (OUTPATIENT)
Dept: GENERAL RADIOLOGY | Facility: HOSPITAL | Age: 70
Discharge: HOME | End: 2025-03-04
Payer: MEDICARE

## 2025-03-04 ENCOUNTER — OFFICE VISIT (OUTPATIENT)
Dept: FAMILY MEDICINE CLINIC | Facility: CLINIC | Age: 70
End: 2025-03-04
Payer: MEDICARE

## 2025-03-04 VITALS
OXYGEN SATURATION: 96 % | WEIGHT: 161.8 LBS | SYSTOLIC BLOOD PRESSURE: 117 MMHG | HEART RATE: 80 BPM | HEIGHT: 60 IN | BODY MASS INDEX: 31.77 KG/M2 | DIASTOLIC BLOOD PRESSURE: 73 MMHG | TEMPERATURE: 98.2 F

## 2025-03-04 DIAGNOSIS — Z86.73 HISTORY OF STROKE: ICD-10-CM

## 2025-03-04 DIAGNOSIS — E78.5 HYPERLIPIDEMIA, UNSPECIFIED HYPERLIPIDEMIA TYPE: ICD-10-CM

## 2025-03-04 DIAGNOSIS — G43.109 MIGRAINE WITH AURA AND WITHOUT STATUS MIGRAINOSUS, NOT INTRACTABLE: ICD-10-CM

## 2025-03-04 DIAGNOSIS — R06.09 DOE (DYSPNEA ON EXERTION): ICD-10-CM

## 2025-03-04 DIAGNOSIS — N18.2 TYPE 2 DIABETES MELLITUS WITH STAGE 2 CHRONIC KIDNEY DISEASE, WITHOUT LONG-TERM CURRENT USE OF INSULIN: ICD-10-CM

## 2025-03-04 DIAGNOSIS — F32.A DEPRESSION, UNSPECIFIED DEPRESSION TYPE: ICD-10-CM

## 2025-03-04 DIAGNOSIS — E11.22 TYPE 2 DIABETES MELLITUS WITH STAGE 2 CHRONIC KIDNEY DISEASE, WITHOUT LONG-TERM CURRENT USE OF INSULIN: ICD-10-CM

## 2025-03-04 DIAGNOSIS — B37.2 SKIN YEAST INFECTION: ICD-10-CM

## 2025-03-04 DIAGNOSIS — I10 BENIGN ESSENTIAL HTN: ICD-10-CM

## 2025-03-04 DIAGNOSIS — G44.83 COUGH HEADACHE: ICD-10-CM

## 2025-03-04 DIAGNOSIS — E66.09 CLASS 1 OBESITY DUE TO EXCESS CALORIES WITH SERIOUS COMORBIDITY AND BODY MASS INDEX (BMI) OF 31.0 TO 31.9 IN ADULT: ICD-10-CM

## 2025-03-04 DIAGNOSIS — E66.811 CLASS 1 OBESITY DUE TO EXCESS CALORIES WITH SERIOUS COMORBIDITY AND BODY MASS INDEX (BMI) OF 31.0 TO 31.9 IN ADULT: ICD-10-CM

## 2025-03-04 DIAGNOSIS — Z00.01 ENCOUNTER FOR ANNUAL GENERAL MEDICAL EXAMINATION WITH ABNORMAL FINDINGS IN ADULT: Primary | ICD-10-CM

## 2025-03-04 DIAGNOSIS — G45.9 TRANSIENT CEREBRAL ISCHEMIA, UNSPECIFIED TYPE: ICD-10-CM

## 2025-03-04 LAB
EXPIRATION DATE: NORMAL
FLUAV AG UPPER RESP QL IA.RAPID: NOT DETECTED
FLUBV AG UPPER RESP QL IA.RAPID: NOT DETECTED
INTERNAL CONTROL: NORMAL
Lab: NORMAL
SARS-COV-2 AG UPPER RESP QL IA.RAPID: NOT DETECTED

## 2025-03-04 PROCEDURE — 3078F DIAST BP <80 MM HG: CPT | Performed by: PREVENTIVE MEDICINE

## 2025-03-04 PROCEDURE — 87428 SARSCOV & INF VIR A&B AG IA: CPT | Performed by: PREVENTIVE MEDICINE

## 2025-03-04 PROCEDURE — 1159F MED LIST DOCD IN RCRD: CPT | Performed by: PREVENTIVE MEDICINE

## 2025-03-04 PROCEDURE — 3074F SYST BP LT 130 MM HG: CPT | Performed by: PREVENTIVE MEDICINE

## 2025-03-04 PROCEDURE — 1160F RVW MEDS BY RX/DR IN RCRD: CPT | Performed by: PREVENTIVE MEDICINE

## 2025-03-04 PROCEDURE — 71046 X-RAY EXAM CHEST 2 VIEWS: CPT

## 2025-03-04 PROCEDURE — 99397 PER PM REEVAL EST PAT 65+ YR: CPT | Performed by: PREVENTIVE MEDICINE

## 2025-03-04 PROCEDURE — 1126F AMNT PAIN NOTED NONE PRSNT: CPT | Performed by: PREVENTIVE MEDICINE

## 2025-03-04 PROCEDURE — 99214 OFFICE O/P EST MOD 30 MIN: CPT | Performed by: PREVENTIVE MEDICINE

## 2025-03-04 PROCEDURE — 1170F FXNL STATUS ASSESSED: CPT | Performed by: PREVENTIVE MEDICINE

## 2025-03-04 PROCEDURE — 70220 X-RAY EXAM OF SINUSES: CPT

## 2025-03-04 PROCEDURE — G0439 PPPS, SUBSEQ VISIT: HCPCS | Performed by: PREVENTIVE MEDICINE

## 2025-03-04 RX ORDER — AZITHROMYCIN 250 MG/1
TABLET, FILM COATED ORAL
Qty: 6 TABLET | Refills: 0 | Status: SHIPPED | OUTPATIENT
Start: 2025-03-04

## 2025-03-04 RX ORDER — CLOTRIMAZOLE AND BETAMETHASONE DIPROPIONATE 10; .64 MG/G; MG/G
1 CREAM TOPICAL 2 TIMES DAILY
Qty: 45 G | Refills: 1 | Status: SHIPPED | OUTPATIENT
Start: 2025-03-04

## 2025-03-04 RX ORDER — DOXYCYCLINE 100 MG/1
100 CAPSULE ORAL 2 TIMES DAILY
Qty: 20 CAPSULE | Refills: 0 | Status: SHIPPED | OUTPATIENT
Start: 2025-03-04

## 2025-03-04 NOTE — PATIENT INSTRUCTIONS
Health Maintenance Due   Topic Date Due    DIABETIC FOOT EXAM  12/01/2024    ANNUAL WELLNESS VISIT  03/01/2025    URINE MICROALBUMIN-CREATININE RATIO (uACR)  03/05/2025    DIABETIC EYE EXAM  04/08/2025    HEMOGLOBIN A1C  05/06/2025    12 hour fast for labs in 2 weeks

## 2025-03-04 NOTE — ASSESSMENT & PLAN NOTE
Patient's (Body mass index is 31.6 kg/m².) indicates that they are obese (BMI >30) with health conditions that include hypertension, diabetes mellitus, and dyslipidemias . Weight is unchanged. BMI  is above average; BMI management plan is completed. We discussed portion control and increasing exercise.

## 2025-03-04 NOTE — PROGRESS NOTES
Subjective   The ABCs of the Annual Wellness Visit  Medicare Wellness Visit      Odalis Solo is a 69 y.o. patient who presents for a Medicare Wellness Visit.    The following portions of the patient's history were reviewed and   updated as appropriate: allergies, current medications, past family history, past medical history, past social history, past surgical history, and problem list.    Compared to one year ago, the patient's physical   health is worse.  Compared to one year ago, the patient's mental   health is the same.    Recent Hospitalizations:  She was not admitted to the hospital during the last year.     Current Medical Providers:  Patient Care Team:  Aubree Alas MD as PCP - General (Family Medicine)  Ian Foley MD as Consulting Physician (Neurosurgery)  Nat Mendez APRN as Nurse Practitioner (Gastroenterology)  Davey Anderson MD (Inactive) as Consulting Physician (Orthopedic Surgery)    Outpatient Medications Prior to Visit   Medication Sig Dispense Refill    amLODIPine (NORVASC) 10 MG tablet Take 1 tablet by mouth Daily. 90 tablet 3    aspirin 81 MG EC tablet Take 1 tablet by mouth Daily.      betamethasone valerate (VALISONE) 0.1 % cream Apply 1 Application topically to the appropriate area as directed 2 (Two) Times a Day. 45 g 0    busPIRone (BUSPAR) 7.5 MG tablet Take 1 tablet by mouth 2 (Two) Times a Day. 60 tablet 1    Dulaglutide (Trulicity) 1.5 MG/0.5ML solution pen-injector ADMINISTER 1.5 MG UNDER THE SKIN 1 TIME EVERY WEEK AS DIRECTED 6 mL 1    FLUoxetine (PROzac) 40 MG capsule TAKE 1 CAPSULE BY MOUTH DAILY 90 capsule 0    furosemide (LASIX) 40 MG tablet TAKE 1 TABLET BY MOUTH DAILY FOR HIGH BLOOD PRESSURE 90 tablet 0    gabapentin (NEURONTIN) 600 MG tablet TAKE 1 TABLET BY MOUTH TWICE DAILY 180 tablet 1    glipizide (GLUCOTROL) 5 MG tablet TAKE 1 TABLET BY MOUTH TWICE DAILY 180 tablet 1    hydrOXYzine (ATARAX) 25 MG tablet Take 1 tablet by mouth 3 (Three)  Times a Day As Needed for Itching. 30 tablet 0    losartan (Cozaar) 50 MG tablet Take 1 tablet by mouth 2 (Two) Times a Day. 180 tablet 1    metoprolol tartrate (LOPRESSOR) 100 MG tablet TAKE 1 TABLET BY MOUTH TWICE DAILY 180 tablet 3    Misc. Devices (Pulse Oximeter For Finger) misc Use 1 each 4 (Four) Times a Day. 1 each 0    ondansetron (ZOFRAN) 4 MG tablet TAKE 1 TABLET BY MOUTH EVERY 6 HOURS AS NEEDED FOR NAUSEA OR VOMITING 30 tablet 0    Praluent 75 MG/ML solution auto-injector ADMINISTER 1 ML UNDER THE SKIN EVERY 14 DAYS AS DIRECTED 2 mL 3    vitamin B-12 (CYANOCOBALAMIN) 1000 MCG tablet Take 1 tablet by mouth Daily.      guaiFENesin-codeine (ROMILAR-AC) 100-10 MG/5ML solution/syrup Take 5 mL by mouth 3 (Three) Times a Day As Needed for Cough for up to 10 days. (Patient not taking: Reported on 3/4/2025) 150 mL 0     No facility-administered medications prior to visit.     No opioid medication identified on active medication list. I have reviewed chart for other potential  high risk medication/s and harmful drug interactions in the elderly.      Aspirin is on active medication list. Aspirin use is indicated based on review of current medical condition/s. Pros and cons of this therapy have been discussed today. Benefits of this medication outweigh potential harm.  Patient has been encouraged to continue taking this medication.  .      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    DDD (degenerative disc disease), cervical    Hyperlipidemia    Leg length discrepancy    Status post hip replacement    History of stroke    Tear of acetabular labrum    Type 2 diabetes mellitus    Lumbar radiculopathy    Cervical radiculopathy    Cervical spondylosis    Complete tear of right rotator cuff    Urine frequency    Suspected COVID-19 virus infection  "   Ataxic gait    Chest tightness    Chiari malformation    MONGE (dyspnea on exertion)    Hypermetropia    Lumbago with sciatica    Migraine with aura    Myalgia    Nuclear sclerosis    Presbyopia    Arthrodesis status    Anxiety    Depression    Class 1 obesity due to excess calories with serious comorbidity and body mass index (BMI) of 31.0 to 31.9 in adult    Transient cerebral ischemia    Anemia, unspecified    Irritable bowel syndrome    Nausea    Chronic low back pain    Other constipation    Status post right knee replacement    Seasonal allergies    Chronic cough     Advance Care Planning Advance Directive is on file.  ACP discussion was held with the patient during this visit. Patient has an advance directive in EMR which is still valid.             Objective   Vitals:    25 0948 25 1000 25 1001   BP: 119/84 116/76 117/73   BP Location: Right arm Left arm Left arm   Patient Position: Sitting Sitting Standing   Cuff Size: Large Adult Adult Adult   Pulse: 80     Temp: 98.2 °F (36.8 °C)     TempSrc: Tympanic     SpO2: 96%     Weight: 73.4 kg (161 lb 12.8 oz)     Height: 152.4 cm (60\")     PainSc: 0-No pain         Estimated body mass index is 31.6 kg/m² as calculated from the following:    Height as of this encounter: 152.4 cm (60\").    Weight as of this encounter: 73.4 kg (161 lb 12.8 oz).                Does the patient have evidence of cognitive impairment? No                                                                                                Health  Risk Assessment    Smoking Status:  Social History     Tobacco Use   Smoking Status Former    Current packs/day: 0.00    Average packs/day: 0.3 packs/day for 10.0 years (2.5 ttl pk-yrs)    Types: Cigarettes    Start date: 1987    Quit date: 1997    Years since quittin.5    Passive exposure: Past   Smokeless Tobacco Never     Alcohol Consumption:  Social History     Substance and Sexual Activity   Alcohol Use Yes    " Comment: Occasionally when dining out       Fall Risk Screen  MILLA Fall Risk Assessment was completed, and patient is at HIGH risk for falls. Assessment completed on:3/4/2025    Depression Screening   Little interest or pleasure in doing things? Not at all   Feeling down, depressed, or hopeless? Several days   PHQ-2 Total Score 1      Health Habits and Functional and Cognitive Screening:      3/4/2025     9:00 AM   Functional & Cognitive Status   Do you have difficulty preparing food and eating? No   Do you have difficulty bathing yourself, getting dressed or grooming yourself? No   Do you have difficulty using the toilet? No   Do you have difficulty moving around from place to place? No   Do you have trouble with steps or getting out of a bed or a chair? No   Current Diet Unhealthy Diet   Dental Exam Up to date   Eye Exam Not up to date   Exercise (times per week) 0 times per week   Current Exercises Include No Regular Exercise   Do you need help using the phone?  No   Are you deaf or do you have serious difficulty hearing?  No   Do you need help to go to places out of walking distance? No   Do you need help shopping? No   Do you need help preparing meals?  No   Do you need help with housework?  No   Do you need help with laundry? No   Do you need help taking your medications? No   Do you need help managing money? No   Do you ever drive or ride in a car without wearing a seat belt? No   Have you felt unusual stress, anger or loneliness in the last month? No   Who do you live with? Alone   If you need help, do you have trouble finding someone available to you? No   Have you been bothered in the last four weeks by sexual problems? No   Do you have difficulty concentrating, remembering or making decisions? Yes           Age-appropriate Screening Schedule:  Refer to the list below for future screening recommendations based on patient's age, sex and/or medical conditions. Orders for these recommended tests are listed  in the plan section. The patient has been provided with a written plan.    Health Maintenance List  Health Maintenance   Topic Date Due    DIABETIC FOOT EXAM  12/01/2024    URINE MICROALBUMIN-CREATININE RATIO (uACR)  03/05/2025    DIABETIC EYE EXAM  04/08/2025    HEMOGLOBIN A1C  05/06/2025    DXA SCAN  08/22/2025    LIPID PANEL  11/06/2025    ANNUAL WELLNESS VISIT  03/04/2026    BMI FOLLOWUP  03/04/2026    MAMMOGRAM  03/20/2026    TDAP/TD VACCINES (3 - Td or Tdap) 09/29/2028    COLORECTAL CANCER SCREENING  09/17/2034    HEPATITIS C SCREENING  Completed    COVID-19 Vaccine  Completed    INFLUENZA VACCINE  Completed    Pneumococcal Vaccine 50+  Completed    ZOSTER VACCINE  Completed    PAP SMEAR  Discontinued                                                                                                                                                CMS Preventative Services Quick Reference  Risk Factors Identified During Encounter  Fall Risk-High or Moderate: Discussed Fall Prevention in the home    The above risks/problems have been discussed with the patient.  Pertinent information has been shared with the patient in the After Visit Summary.  An After Visit Summary and PPPS were made available to the patient.    Follow Up:   Next Medicare Wellness visit to be scheduled in 1 year.         Additional E&M Note during same encounter follows:  Patient has additional, significant, and separately identifiable condition(s)/problem(s) that require work above and beyond the Medicare Wellness Visit     Chief Complaint  Influenza (Had the flu on Feb 12th. Two weeks later she was still feeling bad and was told she had bronchitis. Her cough has not gotten better.), Vaginitis (Also has yeast infection below her abd fold), Cough, Headache, Diabetes, Hypertension, and Medicare Wellness-subsequent    Subjective    HPI    Patient was here for an age specific physical and was advised to wear sunscreen and a seatbelt.    Patient was  also here because she is still having problems with a cough and sinus congestion since she first became ill February 12.  She was ill with the flu and treated with Tamiflu and then went back to the doctor after being well for a week due to return of cough and sputum that is bringing up thick green-yellow sputum without fever.  She has not had any fever does feel somewhat short of breath.    Patient is also complaining of a skin infection that is probably due to yeast beneath the panniculus of her abdomen this is occurred in the past and she has responded to steroid antifungal cream.  Prescription was given today for that as well.  She was prescribed both doxycycline and azithromycin and a chest x-ray for her persistent cough as well.     The patient is a 69-year-old female who presents today for her annual and age-specific physical for Medicare, cough, and headache. She has a history of benign essential hypertension, class I obesity due to excess calories with serious comorbidities and a body mass index of 31, hyperlipidemia, history of stroke, type 2 diabetes with chronic kidney disease, dyspnea on exertion, migraine, depression, TIA, and skin yeast infection.    She fell ill on 02/12/2024 and was treated with Tamiflu, promethazine, and a steroid for influenza A. After completing the Tamiflu course, she experienced an improvement in her condition. However, she subsequently developed ear pain and sought care at an urgent care facility where she tested negative for both influenza and COVID-19. She was prescribed a stronger cough medicine containing codeine and guaifenesin, along with a steroid and promethazine for nausea and vomiting. She reports expectorating green sputum. She has not been prescribed any antibiotics. She has received both the influenza and COVID-19 vaccines. She has been experiencing intermittent fevers, with a maximum temperature of 99.4 degrees Fahrenheit. She has been diagnosed with bronchitis,  but no chest x-ray has been performed. She reports persistent sinus congestion and headaches, which she attempts to alleviate by allowing hot water to run over her head in the shower. She has been avoiding social contact due to her illness. She has been experiencing diarrhea, but no blood in the stool. She has been using Tessalon Perles for her cough, but it has not been effective. She has been experiencing nausea, which has been exacerbated by her sinus drainage.    She has been experiencing yellow vaginal discharge and has an appointment scheduled with Dr. Grace next month. She has been using triamcinolone cream for a rash that developed on her abdomen. She reports that her physical health has deteriorated compared to the previous year, but her mental health remains unchanged. She has not required hospitalization over the past year. She has been experiencing some discomfort in her genital area, which she attributes to a systemic infection.    She has been taking glipizide for her diabetes and reports that her blood sugar levels have been consistently below 100 or above 200. She has an upcoming eye exam scheduled for next month.    She has been taking aspirin 81 mg regularly, but has not refilled her prescription recently. She has an advanced directive in place, which remains valid. She does not require additional information on alcohol misuse, chronic pain, depression, drug use, fall risk, glaucoma, hearing problems, immunizations, loneliness, inactivity, polypharmacy, high-risk sexual behavior, tobacco use, or urinary incontinence. She has been cautious about using handrails when entering and exiting her home to prevent falls.    She has been mindful of her saturated fat intake. She has not experienced any new stroke symptoms. She has been experiencing a decreased appetite.    She has been managing her depression well and has been considering tapering off her medication. She has been taking  "Trulicity.    ALLERGIES  The patient is allergic to SULFA.    MEDICATIONS  Current: Tamiflu, Promethazine, Codeine and guaifenesin, Glipizide, Trulicity, Aspirin (not currently taking)    IMMUNIZATIONS  She has received the flu and COVID-19 shots.  Review of Systems   Constitutional:  Positive for activity change and fatigue.   HENT:  Positive for congestion and sinus pressure.    Respiratory:  Positive for cough and shortness of breath.    Skin:  Positive for rash.   Psychiatric/Behavioral:  Negative for dysphoric mood.           Objective   Vital Signs:  /73 (BP Location: Left arm, Patient Position: Standing, Cuff Size: Adult)   Pulse 80   Temp 98.2 °F (36.8 °C) (Tympanic)   Ht 152.4 cm (60\")   Wt 73.4 kg (161 lb 12.8 oz)   SpO2 96%   BMI 31.60 kg/m²   Physical Exam  Vitals reviewed.   Constitutional:       General: She is not in acute distress.     Appearance: She is well-developed. She is obese. She is not ill-appearing or toxic-appearing.   HENT:      Head: Normocephalic and atraumatic.      Right Ear: Tympanic membrane, ear canal and external ear normal.      Left Ear: Tympanic membrane, ear canal and external ear normal.      Nose: Congestion present.      Mouth/Throat:      Mouth: Mucous membranes are moist.      Pharynx: Posterior oropharyngeal erythema present.   Eyes:      Extraocular Movements: Extraocular movements intact.      Conjunctiva/sclera: Conjunctivae normal.      Pupils: Pupils are equal, round, and reactive to light.   Neck:      Vascular: No carotid bruit.   Cardiovascular:      Rate and Rhythm: Normal rate and regular rhythm.      Heart sounds: Normal heart sounds.   Pulmonary:      Effort: Pulmonary effort is normal.      Comments: Decreased breath sounds bilaterally without any specific rales or wheezes  Abdominal:      General: Bowel sounds are normal. There is no distension.      Palpations: Abdomen is soft. There is no mass.      Tenderness: There is no abdominal " tenderness. There is no right CVA tenderness or left CVA tenderness.   Musculoskeletal:         General: Normal range of motion.      Cervical back: Neck supple. No tenderness.      Right lower leg: No edema.      Left lower leg: No edema.   Lymphadenopathy:      Cervical: No cervical adenopathy.   Skin:     General: Skin is warm.      Findings: Rash present.      Comments: Widespread red annular rash that was oozing some laterally underneath the panniculus on the right side and across the escutcheon area   Neurological:      General: No focal deficit present.      Mental Status: She is alert and oriented to person, place, and time.   Psychiatric:         Mood and Affect: Mood normal.         Behavior: Behavior normal.           Oral exam was performed.  Carotids are normal.  No signs of pneumonia in the lungs.  Heart rate is regular and the pulse rate is 78.    Vital Signs  Blood pressure readings are 119/84, 116/76, and 117/73 when standing.            Results                Assessment and Plan        1. Cough and headache.  A chest x-ray will be ordered to rule out pneumonia. She will be started on doxycycline 100 mg twice daily for 10 days and a Z-Remberto (azithromycin) for 5 days. She is advised to avoid social contact for 2 days after starting the antibiotics. A sinus x-ray will also be ordered. She is advised to use honey for her cough.    2. Skin yeast infection.  The infection is likely due to her diabetes. She will be prescribed clotrimazole with betamethasone cream, to be applied twice daily for 2 to 3 weeks. A refill will be provided for future use. If the infection does not resolve after 10 days of treatment, she should contact the office.    3. Type 2 diabetes with chronic kidney disease.  She is currently on glipizide and Trulicity. She is advised to continue her current medication regimen. Labs including lipid panel, magnesium, and urine test will be ordered. She should fast for 12 hours before the lab  tests.    4. Hyperlipidemia.  She is advised to watch her saturated fat intake. A lipid panel will be ordered.    5. Depression.  Her depression is currently stable. She is advised to continue her current treatment plan.    6. Health Maintenance.  She is up to date with her flu and COVID-19 shots. She has an upcoming eye exam and breast exam next month. She is advised to ensure that the eye doctor sends a note with the results. She is also advised to resume taking aspirin 81 mg daily.            Follow Up   Return in about 3 months (around 6/4/2025).  Patient was given instructions and counseling regarding her condition or for health maintenance advice. Please see specific information pulled into the AVS if appropriate.  Patient or patient representative verbalized consent for the use of Ambient Listening during the visit with  Aubree Alas MD for chart documentation. 3/4/2025  17:35 EST

## 2025-03-07 ENCOUNTER — TELEPHONE (OUTPATIENT)
Dept: FAMILY MEDICINE CLINIC | Facility: CLINIC | Age: 70
End: 2025-03-07
Payer: MEDICARE

## 2025-03-07 NOTE — PROGRESS NOTES
Xchest xray clear but acute sinus infection right cheek area-take antibiotics till gone and if still symptoms will refer to ENT doctor-keep us posted.

## 2025-03-07 NOTE — TELEPHONE ENCOUNTER
----- Message from Aubree Alas sent at 3/7/2025  9:17 AM EST -----  Xchest xray clear but acute sinus infection right cheek area-take antibiotics till gone and if still symptoms will refer to ENT doctor-keep us posted.

## 2025-03-24 RX ORDER — FUROSEMIDE 40 MG/1
40 TABLET ORAL DAILY
Qty: 90 TABLET | Refills: 0 | Status: SHIPPED | OUTPATIENT
Start: 2025-03-24

## 2025-03-24 RX ORDER — GABAPENTIN 600 MG/1
600 TABLET ORAL 2 TIMES DAILY
Qty: 180 TABLET | Refills: 1 | Status: SHIPPED | OUTPATIENT
Start: 2025-03-24

## 2025-03-24 NOTE — TELEPHONE ENCOUNTER
Rx Refill Note  Requested Prescriptions     Pending Prescriptions Disp Refills   • gabapentin (NEURONTIN) 600 MG tablet [Pharmacy Med Name: GABAPENTIN 600MG TABLETS] 180 tablet 1     Sig: TAKE 1 TABLET BY MOUTH TWICE DAILY      Last office visit with prescribing clinician: Visit date not found   Last telemedicine visit with prescribing clinician: Visit date not found   Next office visit with prescribing clinician: Visit date not found       Would you like a call back once the refill request has been completed: [] Yes [] No    If the office needs to give you a call back, can they leave a voicemail: [] Yes [] No    Erika Silva MA  03/24/25, 08:43 EDT

## 2025-03-25 ENCOUNTER — LAB (OUTPATIENT)
Dept: FAMILY MEDICINE CLINIC | Facility: CLINIC | Age: 70
End: 2025-03-25
Payer: MEDICARE

## 2025-03-25 DIAGNOSIS — N18.2 TYPE 2 DIABETES MELLITUS WITH STAGE 2 CHRONIC KIDNEY DISEASE, WITHOUT LONG-TERM CURRENT USE OF INSULIN: ICD-10-CM

## 2025-03-25 DIAGNOSIS — E11.22 TYPE 2 DIABETES MELLITUS WITH STAGE 2 CHRONIC KIDNEY DISEASE, WITHOUT LONG-TERM CURRENT USE OF INSULIN: ICD-10-CM

## 2025-03-25 DIAGNOSIS — E78.5 HYPERLIPIDEMIA, UNSPECIFIED HYPERLIPIDEMIA TYPE: ICD-10-CM

## 2025-03-25 DIAGNOSIS — Z00.01 ENCOUNTER FOR ANNUAL GENERAL MEDICAL EXAMINATION WITH ABNORMAL FINDINGS IN ADULT: ICD-10-CM

## 2025-03-25 DIAGNOSIS — F32.A DEPRESSION, UNSPECIFIED DEPRESSION TYPE: ICD-10-CM

## 2025-03-25 LAB
ALBUMIN SERPL-MCNC: 4.1 G/DL (ref 3.5–5.2)
ALBUMIN/GLOB SERPL: 1.7 G/DL
ALP SERPL-CCNC: 104 U/L (ref 39–117)
ALT SERPL W P-5'-P-CCNC: 18 U/L (ref 1–33)
ANION GAP SERPL CALCULATED.3IONS-SCNC: 12 MMOL/L (ref 5–15)
AST SERPL-CCNC: 16 U/L (ref 1–32)
BASOPHILS # BLD AUTO: 0.02 10*3/MM3 (ref 0–0.2)
BASOPHILS NFR BLD AUTO: 0.5 % (ref 0–1.5)
BILIRUB SERPL-MCNC: 0.3 MG/DL (ref 0–1.2)
BUN SERPL-MCNC: 8 MG/DL (ref 8–23)
BUN/CREAT SERPL: 10.1 (ref 7–25)
CALCIUM SPEC-SCNC: 9.2 MG/DL (ref 8.6–10.5)
CHLORIDE SERPL-SCNC: 104 MMOL/L (ref 98–107)
CHOLEST SERPL-MCNC: 227 MG/DL (ref 0–200)
CO2 SERPL-SCNC: 25 MMOL/L (ref 22–29)
CREAT SERPL-MCNC: 0.79 MG/DL (ref 0.57–1)
DEPRECATED RDW RBC AUTO: 46.6 FL (ref 37–54)
EGFRCR SERPLBLD CKD-EPI 2021: 81.6 ML/MIN/1.73
EOSINOPHIL # BLD AUTO: 0.09 10*3/MM3 (ref 0–0.4)
EOSINOPHIL NFR BLD AUTO: 2.3 % (ref 0.3–6.2)
ERYTHROCYTE [DISTWIDTH] IN BLOOD BY AUTOMATED COUNT: 14 % (ref 12.3–15.4)
GLOBULIN UR ELPH-MCNC: 2.4 GM/DL
GLUCOSE SERPL-MCNC: 151 MG/DL (ref 65–99)
HBA1C MFR BLD: 6.4 % (ref 4.8–5.6)
HCT VFR BLD AUTO: 37.6 % (ref 34–46.6)
HDLC SERPL-MCNC: 41 MG/DL (ref 40–60)
HGB BLD-MCNC: 12.4 G/DL (ref 12–15.9)
IMM GRANULOCYTES # BLD AUTO: 0.01 10*3/MM3 (ref 0–0.05)
IMM GRANULOCYTES NFR BLD AUTO: 0.3 % (ref 0–0.5)
LDLC SERPL CALC-MCNC: 127 MG/DL (ref 0–100)
LDLC/HDLC SERPL: 2.93 {RATIO}
LYMPHOCYTES # BLD AUTO: 1.19 10*3/MM3 (ref 0.7–3.1)
LYMPHOCYTES NFR BLD AUTO: 30 % (ref 19.6–45.3)
MAGNESIUM SERPL-MCNC: 1.9 MG/DL (ref 1.6–2.4)
MCH RBC QN AUTO: 30.4 PG (ref 26.6–33)
MCHC RBC AUTO-ENTMCNC: 33 G/DL (ref 31.5–35.7)
MCV RBC AUTO: 92.2 FL (ref 79–97)
MONOCYTES # BLD AUTO: 0.34 10*3/MM3 (ref 0.1–0.9)
MONOCYTES NFR BLD AUTO: 8.6 % (ref 5–12)
NEUTROPHILS NFR BLD AUTO: 2.32 10*3/MM3 (ref 1.7–7)
NEUTROPHILS NFR BLD AUTO: 58.3 % (ref 42.7–76)
NRBC BLD AUTO-RTO: 0 /100 WBC (ref 0–0.2)
PLATELET # BLD AUTO: 226 10*3/MM3 (ref 140–450)
PMV BLD AUTO: 10.4 FL (ref 6–12)
POTASSIUM SERPL-SCNC: 4.1 MMOL/L (ref 3.5–5.2)
PROT SERPL-MCNC: 6.5 G/DL (ref 6–8.5)
RBC # BLD AUTO: 4.08 10*6/MM3 (ref 3.77–5.28)
SODIUM SERPL-SCNC: 141 MMOL/L (ref 136–145)
TRIGL SERPL-MCNC: 330 MG/DL (ref 0–150)
TSH SERPL DL<=0.05 MIU/L-ACNC: 1.83 UIU/ML (ref 0.27–4.2)
VIT B12 BLD-MCNC: 346 PG/ML (ref 211–946)
VLDLC SERPL-MCNC: 59 MG/DL (ref 5–40)
WBC NRBC COR # BLD AUTO: 3.97 10*3/MM3 (ref 3.4–10.8)

## 2025-03-25 PROCEDURE — 83735 ASSAY OF MAGNESIUM: CPT | Performed by: PREVENTIVE MEDICINE

## 2025-03-25 PROCEDURE — 83036 HEMOGLOBIN GLYCOSYLATED A1C: CPT | Performed by: PREVENTIVE MEDICINE

## 2025-03-25 PROCEDURE — 80061 LIPID PANEL: CPT | Performed by: PREVENTIVE MEDICINE

## 2025-03-25 PROCEDURE — 82570 ASSAY OF URINE CREATININE: CPT | Performed by: PREVENTIVE MEDICINE

## 2025-03-25 PROCEDURE — 36415 COLL VENOUS BLD VENIPUNCTURE: CPT

## 2025-03-25 PROCEDURE — 80053 COMPREHEN METABOLIC PANEL: CPT | Performed by: PREVENTIVE MEDICINE

## 2025-03-25 PROCEDURE — 85025 COMPLETE CBC W/AUTO DIFF WBC: CPT | Performed by: PREVENTIVE MEDICINE

## 2025-03-25 PROCEDURE — 82607 VITAMIN B-12: CPT | Performed by: PREVENTIVE MEDICINE

## 2025-03-25 PROCEDURE — 84443 ASSAY THYROID STIM HORMONE: CPT | Performed by: PREVENTIVE MEDICINE

## 2025-03-25 PROCEDURE — 82043 UR ALBUMIN QUANTITATIVE: CPT | Performed by: PREVENTIVE MEDICINE

## 2025-03-25 NOTE — TELEPHONE ENCOUNTER
Rx Refill Note  Requested Prescriptions     Pending Prescriptions Disp Refills    ondansetron (ZOFRAN) 4 MG tablet 30 tablet 0     Sig: Take 1 tablet by mouth Every 6 (Six) Hours As Needed for Nausea or Vomiting.      Last office visit with prescribing clinician: 3/4/2025   Last telemedicine visit with prescribing clinician: Visit date not found   Next office visit with prescribing clinician: 6/5/2025       Would you like a call back once the refill request has been completed: [x] Yes [] No    If the office needs to give you a call back, can they leave a voicemail: [x] Yes [] No    Lizzy Taylor  03/25/25, 10:01 EDT

## 2025-03-26 LAB
ALBUMIN UR-MCNC: 19.3 MG/DL
CREAT UR-MCNC: 177.5 MG/DL
MICROALBUMIN/CREAT UR: 108.7 MG/G (ref 0–29)

## 2025-03-26 RX ORDER — ONDANSETRON 4 MG/1
4 TABLET, FILM COATED ORAL EVERY 6 HOURS PRN
Qty: 30 TABLET | Refills: 0 | Status: SHIPPED | OUTPATIENT
Start: 2025-03-26

## 2025-03-27 RX ORDER — DAPAGLIFLOZIN 5 MG/1
5 TABLET, FILM COATED ORAL DAILY
Qty: 30 TABLET | Refills: 1 | Status: SHIPPED | OUTPATIENT
Start: 2025-03-27

## 2025-03-27 RX ORDER — METOPROLOL TARTRATE 100 MG/1
100 TABLET ORAL EVERY 12 HOURS SCHEDULED
Qty: 180 TABLET | Refills: 3 | Status: SHIPPED | OUTPATIENT
Start: 2025-03-27

## 2025-05-07 RX ORDER — FLUOXETINE HYDROCHLORIDE 40 MG/1
40 CAPSULE ORAL DAILY
Qty: 90 CAPSULE | Refills: 0 | Status: SHIPPED | OUTPATIENT
Start: 2025-05-07

## 2025-05-14 ENCOUNTER — RESULTS FOLLOW-UP (OUTPATIENT)
Dept: FAMILY MEDICINE CLINIC | Facility: CLINIC | Age: 70
End: 2025-05-14
Payer: MEDICARE

## 2025-05-19 RX ORDER — DAPAGLIFLOZIN 5 MG/1
5 TABLET, FILM COATED ORAL DAILY
Qty: 30 TABLET | Refills: 1 | Status: SHIPPED | OUTPATIENT
Start: 2025-05-19

## 2025-06-08 PROBLEM — Z20.822 SUSPECTED COVID-19 VIRUS INFECTION: Status: RESOLVED | Noted: 2021-08-11 | Resolved: 2025-06-08

## 2025-06-08 NOTE — PATIENT INSTRUCTIONS
Health Maintenance Due   Topic Date Due    DIABETIC FOOT EXAM  12/01/2024    COVID-19 Vaccine (7 - 2024-25 season) 03/30/2025    DIABETIC EYE EXAM  04/08/2025    DXA SCAN  08/22/2025

## 2025-06-10 ENCOUNTER — OFFICE VISIT (OUTPATIENT)
Dept: FAMILY MEDICINE CLINIC | Facility: CLINIC | Age: 70
End: 2025-06-10
Payer: MEDICARE

## 2025-06-10 ENCOUNTER — LAB (OUTPATIENT)
Dept: FAMILY MEDICINE CLINIC | Facility: CLINIC | Age: 70
End: 2025-06-10
Payer: MEDICARE

## 2025-06-10 VITALS
HEIGHT: 60 IN | DIASTOLIC BLOOD PRESSURE: 85 MMHG | OXYGEN SATURATION: 98 % | SYSTOLIC BLOOD PRESSURE: 129 MMHG | HEART RATE: 74 BPM | WEIGHT: 160.1 LBS | BODY MASS INDEX: 31.43 KG/M2 | TEMPERATURE: 98 F

## 2025-06-10 DIAGNOSIS — E11.22 TYPE 2 DIABETES MELLITUS WITH STAGE 2 CHRONIC KIDNEY DISEASE, WITHOUT LONG-TERM CURRENT USE OF INSULIN: ICD-10-CM

## 2025-06-10 DIAGNOSIS — E11.22 TYPE 2 DIABETES MELLITUS WITH STAGE 2 CHRONIC KIDNEY DISEASE, WITHOUT LONG-TERM CURRENT USE OF INSULIN: Primary | ICD-10-CM

## 2025-06-10 DIAGNOSIS — K21.9 GASTROESOPHAGEAL REFLUX DISEASE, UNSPECIFIED WHETHER ESOPHAGITIS PRESENT: ICD-10-CM

## 2025-06-10 DIAGNOSIS — E66.09 CLASS 1 OBESITY DUE TO EXCESS CALORIES WITH SERIOUS COMORBIDITY AND BODY MASS INDEX (BMI) OF 31.0 TO 31.9 IN ADULT: ICD-10-CM

## 2025-06-10 DIAGNOSIS — I10 BENIGN ESSENTIAL HTN: ICD-10-CM

## 2025-06-10 DIAGNOSIS — E55.9 VITAMIN D DEFICIENCY: ICD-10-CM

## 2025-06-10 DIAGNOSIS — J40 BRONCHITIS: ICD-10-CM

## 2025-06-10 DIAGNOSIS — G45.9 TRANSIENT CEREBRAL ISCHEMIA, UNSPECIFIED TYPE: ICD-10-CM

## 2025-06-10 DIAGNOSIS — E66.811 CLASS 1 OBESITY DUE TO EXCESS CALORIES WITH SERIOUS COMORBIDITY AND BODY MASS INDEX (BMI) OF 31.0 TO 31.9 IN ADULT: ICD-10-CM

## 2025-06-10 DIAGNOSIS — Z78.0 POST-MENOPAUSAL: ICD-10-CM

## 2025-06-10 DIAGNOSIS — Z86.73 HISTORY OF STROKE: ICD-10-CM

## 2025-06-10 DIAGNOSIS — E78.5 HYPERLIPIDEMIA, UNSPECIFIED HYPERLIPIDEMIA TYPE: ICD-10-CM

## 2025-06-10 DIAGNOSIS — N18.2 TYPE 2 DIABETES MELLITUS WITH STAGE 2 CHRONIC KIDNEY DISEASE, WITHOUT LONG-TERM CURRENT USE OF INSULIN: Primary | ICD-10-CM

## 2025-06-10 DIAGNOSIS — F32.A DEPRESSION, UNSPECIFIED DEPRESSION TYPE: ICD-10-CM

## 2025-06-10 DIAGNOSIS — R05.3 CHRONIC COUGH: ICD-10-CM

## 2025-06-10 DIAGNOSIS — N18.2 TYPE 2 DIABETES MELLITUS WITH STAGE 2 CHRONIC KIDNEY DISEASE, WITHOUT LONG-TERM CURRENT USE OF INSULIN: ICD-10-CM

## 2025-06-10 LAB
25(OH)D3 SERPL-MCNC: 30.9 NG/ML (ref 30–100)
ALBUMIN SERPL-MCNC: 4.5 G/DL (ref 3.5–5.2)
ALBUMIN/GLOB SERPL: 1.7 G/DL
ALP SERPL-CCNC: 109 U/L (ref 39–117)
ALT SERPL W P-5'-P-CCNC: 18 U/L (ref 1–33)
ANION GAP SERPL CALCULATED.3IONS-SCNC: 13 MMOL/L (ref 5–15)
AST SERPL-CCNC: 17 U/L (ref 1–32)
BASOPHILS # BLD AUTO: 0.02 10*3/MM3 (ref 0–0.2)
BASOPHILS NFR BLD AUTO: 0.4 % (ref 0–1.5)
BILIRUB SERPL-MCNC: 0.4 MG/DL (ref 0–1.2)
BUN SERPL-MCNC: 11 MG/DL (ref 8–23)
BUN/CREAT SERPL: 13.4 (ref 7–25)
CALCIUM SPEC-SCNC: 9.6 MG/DL (ref 8.6–10.5)
CHLORIDE SERPL-SCNC: 101 MMOL/L (ref 98–107)
CHOLEST SERPL-MCNC: 174 MG/DL (ref 0–200)
CO2 SERPL-SCNC: 24 MMOL/L (ref 22–29)
CREAT SERPL-MCNC: 0.82 MG/DL (ref 0.57–1)
DEPRECATED RDW RBC AUTO: 46.4 FL (ref 37–54)
EGFRCR SERPLBLD CKD-EPI 2021: 78 ML/MIN/1.73
EOSINOPHIL # BLD AUTO: 0.09 10*3/MM3 (ref 0–0.4)
EOSINOPHIL NFR BLD AUTO: 1.6 % (ref 0.3–6.2)
ERYTHROCYTE [DISTWIDTH] IN BLOOD BY AUTOMATED COUNT: 13.1 % (ref 12.3–15.4)
GLOBULIN UR ELPH-MCNC: 2.6 GM/DL
GLUCOSE SERPL-MCNC: 155 MG/DL (ref 65–99)
HBA1C MFR BLD: 5.7 % (ref 4.8–5.6)
HCT VFR BLD AUTO: 41.7 % (ref 34–46.6)
HDLC SERPL-MCNC: 40 MG/DL (ref 40–60)
HGB BLD-MCNC: 13.5 G/DL (ref 12–15.9)
IMM GRANULOCYTES # BLD AUTO: 0.03 10*3/MM3 (ref 0–0.05)
IMM GRANULOCYTES NFR BLD AUTO: 0.5 % (ref 0–0.5)
LDLC SERPL CALC-MCNC: 63 MG/DL (ref 0–100)
LDLC/HDLC SERPL: 1.03 {RATIO}
LYMPHOCYTES # BLD AUTO: 1.48 10*3/MM3 (ref 0.7–3.1)
LYMPHOCYTES NFR BLD AUTO: 26.1 % (ref 19.6–45.3)
MAGNESIUM SERPL-MCNC: 2.1 MG/DL (ref 1.6–2.4)
MCH RBC QN AUTO: 31 PG (ref 26.6–33)
MCHC RBC AUTO-ENTMCNC: 32.4 G/DL (ref 31.5–35.7)
MCV RBC AUTO: 95.6 FL (ref 79–97)
MONOCYTES # BLD AUTO: 0.45 10*3/MM3 (ref 0.1–0.9)
MONOCYTES NFR BLD AUTO: 7.9 % (ref 5–12)
NEUTROPHILS NFR BLD AUTO: 3.61 10*3/MM3 (ref 1.7–7)
NEUTROPHILS NFR BLD AUTO: 63.5 % (ref 42.7–76)
NRBC BLD AUTO-RTO: 0 /100 WBC (ref 0–0.2)
PLATELET # BLD AUTO: 217 10*3/MM3 (ref 140–450)
PMV BLD AUTO: 10.5 FL (ref 6–12)
POTASSIUM SERPL-SCNC: 4.4 MMOL/L (ref 3.5–5.2)
PROT SERPL-MCNC: 7.1 G/DL (ref 6–8.5)
RBC # BLD AUTO: 4.36 10*6/MM3 (ref 3.77–5.28)
SODIUM SERPL-SCNC: 138 MMOL/L (ref 136–145)
TRIGL SERPL-MCNC: 464 MG/DL (ref 0–150)
TSH SERPL DL<=0.05 MIU/L-ACNC: 1.44 UIU/ML (ref 0.27–4.2)
VIT B12 BLD-MCNC: 345 PG/ML (ref 211–946)
VLDLC SERPL-MCNC: 71 MG/DL (ref 5–40)
WBC NRBC COR # BLD AUTO: 5.68 10*3/MM3 (ref 3.4–10.8)

## 2025-06-10 PROCEDURE — 36415 COLL VENOUS BLD VENIPUNCTURE: CPT

## 2025-06-10 PROCEDURE — 80061 LIPID PANEL: CPT | Performed by: PREVENTIVE MEDICINE

## 2025-06-10 PROCEDURE — 83036 HEMOGLOBIN GLYCOSYLATED A1C: CPT | Performed by: PREVENTIVE MEDICINE

## 2025-06-10 PROCEDURE — 85025 COMPLETE CBC W/AUTO DIFF WBC: CPT | Performed by: PREVENTIVE MEDICINE

## 2025-06-10 PROCEDURE — 82306 VITAMIN D 25 HYDROXY: CPT | Performed by: PREVENTIVE MEDICINE

## 2025-06-10 PROCEDURE — 82607 VITAMIN B-12: CPT | Performed by: PREVENTIVE MEDICINE

## 2025-06-10 PROCEDURE — 82570 ASSAY OF URINE CREATININE: CPT | Performed by: PREVENTIVE MEDICINE

## 2025-06-10 PROCEDURE — 80053 COMPREHEN METABOLIC PANEL: CPT | Performed by: PREVENTIVE MEDICINE

## 2025-06-10 PROCEDURE — 84443 ASSAY THYROID STIM HORMONE: CPT | Performed by: PREVENTIVE MEDICINE

## 2025-06-10 PROCEDURE — 83735 ASSAY OF MAGNESIUM: CPT | Performed by: PREVENTIVE MEDICINE

## 2025-06-10 PROCEDURE — 82043 UR ALBUMIN QUANTITATIVE: CPT | Performed by: PREVENTIVE MEDICINE

## 2025-06-10 RX ORDER — LOSARTAN POTASSIUM 50 MG/1
TABLET ORAL
Start: 2025-06-10

## 2025-06-10 NOTE — PROGRESS NOTES
Subjective   Odalis Solo is a 69 y.o. female presents for   Chief Complaint   Patient presents with    Primary Care Follow-Up     Wants to follow up from bronchitis that sent her to the urgent care. This has been a re-current thing for her. Pt states she feels better over all but still has a cough and can't seem to get her voice back. Pt is not fasting.     Diabetes       Health Maintenance Due   Topic Date Due    DIABETIC FOOT EXAM  12/01/2024    COVID-19 Vaccine (7 - 2024-25 season) 03/30/2025    DIABETIC EYE EXAM  04/08/2025    DXA SCAN  08/22/2025       Primary Care Follow-UpAssociated symptoms include: cough and trouble swallowing. Pertinent negatives include no wheezing.   Diabetes     History of Present Illness  The patient is a 69-year-old female who is here today to follow up on type 2 diabetes, transient cerebral ischemia, vitamin D deficiency, postmenopausal benign essential hypertension, chronic cough, class 1 obesity, depression, GERD, history of stroke, and hyperlipidemia.    She sought urgent care in 05/2025 for a productive cough with green and yellow sputum, which has since transitioned to a dry cough. She did not have a fever at that time and was not tested for COVID-19 or influenza. She was prescribed prednisone and Flonase. Her voice is gradually returning, but the cough persists. Allergy testing revealed airborne allergies but no cat allergy. She was informed that reflux could be contributing to her cough and was prescribed Protonix, which she has not taken due to her irregular eating schedule. She reports a persistent raspy voice and occasional neck discomfort, which she attributes to internal irritation. She experiences wheezing during severe coughing episodes. She has reduced her amlodipine and losartan dosage due to concerns about a dry cough. She has previously tried an inhaler but discontinued it due to jitteriness. She has not taken Singulair due to concerns about potential side  "effects on her depression. She has a history of smoking over 20 years ago but has been abstinent for the past 20 to 25 years.    She reports recent episodes of diarrhea following meals, a symptom she has experienced in the past. She does not report any chest pressure or palpitations. She does not report any changes in stool color, urinary symptoms, or vaginal discharge. She has gained weight but maintains that it is stable. She has difficulty consuming water since her COVID-19 infection, even when flavored with lemon or cucumber. She has a history of GERD.    She has an upcoming appointment with her ophthalmologist next month and typically visits annually. She has not seen her dentist in the past year and is scheduled to have her dentures adjusted on Thursday. She had a bone density scan on 08/22/2023, which showed osteopenia. She had a mammogram a few weeks ago, which was normal. She has an appointment with her surgeon next week regarding her shunt as she has been experiencing headaches and balance issues. She has not been monitoring her blood glucose levels. She is not currently taking vitamin D supplements. She engages in 20 minutes of walking daily.    SOCIAL HISTORY  The patient smoked socially over 20 years ago but has not smoked in the last 20-25 years.    Vitals:    06/10/25 1052 06/10/25 1057   BP: 122/80 129/85   BP Location: Left arm Right arm   Patient Position: Sitting Sitting   Cuff Size: Large Adult Large Adult   Pulse: 74    Temp: 98 °F (36.7 °C)    TempSrc: Infrared    SpO2: 98%    Weight: 72.6 kg (160 lb 1.6 oz)    Height: 152.4 cm (60\")      Body mass index is 31.27 kg/m².    Current Outpatient Medications on File Prior to Visit   Medication Sig Dispense Refill    amLODIPine (NORVASC) 10 MG tablet Take 1 tablet by mouth Daily. 90 tablet 3    aspirin 81 MG EC tablet Take 1 tablet by mouth Daily.      betamethasone valerate (VALISONE) 0.1 % cream Apply 1 Application topically to the appropriate area " as directed 2 (Two) Times a Day. 45 g 0    clotrimazole-betamethasone (LOTRISONE) 1-0.05 % cream Apply 1 Application topically to the appropriate area as directed 2 (Two) Times a Day. 45 g 1    Farxiga 5 MG tablet tablet TAKE 1 TABLET BY MOUTH DAILY 30 tablet 1    FLUoxetine (PROzac) 40 MG capsule TAKE 1 CAPSULE BY MOUTH DAILY 90 capsule 0    furosemide (LASIX) 40 MG tablet TAKE 1 TABLET BY MOUTH DAILY FOR HIGH BLOOD PRESSURE 90 tablet 0    gabapentin (NEURONTIN) 600 MG tablet TAKE 1 TABLET BY MOUTH TWICE DAILY 180 tablet 1    glipizide (GLUCOTROL) 5 MG tablet TAKE 1 TABLET BY MOUTH TWICE DAILY 180 tablet 1    metoprolol tartrate (LOPRESSOR) 100 MG tablet TAKE 1 TABLET BY MOUTH TWICE DAILY 180 tablet 3    Misc. Devices (Pulse Oximeter For Finger) misc Use 1 each 4 (Four) Times a Day. 1 each 0    pantoprazole (PROTONIX) 40 MG EC tablet TAKE 1 TABLET BY MOUTH 30 MINUTES BEFORE DINNER      Praluent 75 MG/ML solution auto-injector ADMINISTER 1 ML UNDER THE SKIN EVERY 14 DAYS AS DIRECTED 2 mL 3    Triamcinolone Acetonide (NASACORT) 55 MCG/ACT nasal inhaler USE 1 SPRAY IN EACH NOSTRIL DAILY IN THE MORNING      Trulicity 1.5 MG/0.5ML solution auto-injector ADMINISTER 1.5 MG UNDER THE SKIN 1 TIME EVERY WEEK AS DIRECTED      [DISCONTINUED] losartan (Cozaar) 50 MG tablet Take 1 tablet by mouth 2 (Two) Times a Day. 180 tablet 1    [DISCONTINUED] montelukast (SINGULAIR) 10 MG tablet Take 1 tablet by mouth Every Night. 30 tablet 0     No current facility-administered medications on file prior to visit.       The following portions of the patient's history were reviewed and updated as appropriate: allergies, current medications, past family history, past medical history, past social history, past surgical history, and problem list.    Review of Systems   HENT:  Positive for trouble swallowing and voice change.    Respiratory:  Positive for cough. Negative for wheezing.    Gastrointestinal:  Negative for GERD.    Psychiatric/Behavioral:  Positive for dysphoric mood.        Objective   Physical Exam  Vitals reviewed.   Constitutional:       General: She is not in acute distress.     Appearance: She is well-developed. She is obese. She is not ill-appearing or toxic-appearing.   HENT:      Head: Normocephalic and atraumatic.      Right Ear: Tympanic membrane, ear canal and external ear normal.      Left Ear: Tympanic membrane, ear canal and external ear normal.      Nose: Nose normal.      Mouth/Throat:      Mouth: Mucous membranes are moist.      Pharynx: No posterior oropharyngeal erythema.   Eyes:      Extraocular Movements: Extraocular movements intact.      Conjunctiva/sclera: Conjunctivae normal.      Pupils: Pupils are equal, round, and reactive to light.   Neck:      Vascular: No carotid bruit.   Cardiovascular:      Rate and Rhythm: Normal rate and regular rhythm.      Pulses:           Dorsalis pedis pulses are 1+ on the right side and 1+ on the left side.        Posterior tibial pulses are 1+ on the right side and 1+ on the left side.      Heart sounds: Normal heart sounds.   Pulmonary:      Effort: Pulmonary effort is normal.      Breath sounds: Normal breath sounds.      Comments: Decreased breath sounds eliu  Abdominal:      General: Bowel sounds are normal. There is no distension.      Palpations: Abdomen is soft. There is no mass.      Tenderness: There is no abdominal tenderness. There is no right CVA tenderness or left CVA tenderness.   Musculoskeletal:      Cervical back: Neck supple. No tenderness.      Right lower leg: No edema.      Left lower leg: No edema.   Feet:      Right foot:      Protective Sensation: 10 sites tested.  10 sites sensed.      Skin integrity: Callus present.      Toenail Condition: Right toenails are normal.      Left foot:      Protective Sensation: 10 sites tested.  10 sites sensed.      Skin integrity: Callus present.      Toenail Condition: Left toenails are normal.       Comments: Diabetic Foot Exam Performed and Monofilament Test Performed    Lymphadenopathy:      Cervical: No cervical adenopathy.   Skin:     General: Skin is warm.   Neurological:      General: No focal deficit present.      Mental Status: She is alert and oriented to person, place, and time.   Psychiatric:         Mood and Affect: Mood normal.         Behavior: Behavior normal.       Physical Exam  Mouth/Throat: Mucous membranes moist, no erythema, no exudate  Respiratory: Clear to auscultation, no wheezing, rales or rhonchi  Cardiovascular: Regular rate and rhythm, no murmurs, rubs, or gallops  Skin: Mosquito bite noted    PHQ-9 Total Score:    Results  Imaging   - Bone density scan: 08/22/2023, Showed osteopenia         Assessment & Plan   Diagnoses and all orders for this visit:    1. Type 2 diabetes mellitus with stage 2 chronic kidney disease, without long-term current use of insulin (Primary)  -     Comprehensive Metabolic Panel; Future  -     Hemoglobin A1c; Future  -     Microalbumin / Creatinine Urine Ratio - Urine, Clean Catch; Future    2. Post-menopausal  -     DEXA Bone Density Axial; Future    3. Benign essential HTN  -     CBC Auto Differential; Future    4. Chronic cough    5. Class 1 obesity due to excess calories with serious comorbidity and body mass index (BMI) of 31.0 to 31.9 in adult    6. Depression, unspecified depression type  -     Magnesium; Future  -     TSH Rfx On Abnormal To Free T4; Future  -     Vitamin B12; Future    7. Gastroesophageal reflux disease, unspecified whether esophagitis present  -     Magnesium; Future    8. History of stroke    9. Hyperlipidemia, unspecified hyperlipidemia type  -     Lipid Panel; Future    10. Transient cerebral ischemia, unspecified type    11. Vitamin D deficiency  -     Vitamin D,25-Hydroxy; Future    12. Bronchitis    Other orders  -     losartan (Cozaar) 50 MG tablet; Take one tablet daily  Indications: High Blood Pressure      Assessment &  Plan  1. Chronic cough.  - Persistent dry cough, previously productive with green and yellow sputum.  - Prescribed prednisone and Flonase by urgent care; allergy testing revealed sensitivities to airborne allergens but not to cats.  - Reflux suggested as a potential cause; advised to start taking Protonix once daily, regardless of meal timing.  - Consider trying Singulair again if voice does not improve; discussed new inhaler with steroid component but not prescribed due to cost concerns.    2. Diarrhea.  - Recent episodes of diarrhea after eating, experienced in the past.  - Advised to avoid spicy and greasy foods and limit dairy intake.  - Recommended increasing fruit, fluid, fiber, and water intake to manage symptoms.    3. Osteopenia.  - Last bone density scan on 08/22/2023 showed thinning of the bones.  - Follow-up DEXA scan scheduled for 09/2025 to monitor bone health.    4. Type 2 diabetes mellitus.  - Not checking blood sugars regularly.  - A1c test will be conducted to assess current glycemic control.    5. Vitamin D deficiency.  - Not currently taking vitamin D supplements.  - Vitamin D levels will be measured with upcoming labs; advised accordingly.    6. Benign essential hypertension.  - Blood pressure readings today were 122/80 and 129/85, within normal limits.  - Currently taking losartan 50 mg once daily; reduced amlodipine dosage due to concerns about dry cough.    7. Depression.  - Not taking Singulair due to concerns about potential side effects exacerbating depression.    8. Gastroesophageal reflux disease (GERD).  - History of GERD; prescribed Protonix but not taking it regularly.  - Advised to start taking Protonix once daily at the same time each day to manage symptoms.    Follow-up  - Follow up in 3 months.    Patient Instructions     Health Maintenance Due   Topic Date Due    DIABETIC FOOT EXAM  12/01/2024    COVID-19 Vaccine (7 - 2024-25 season) 03/30/2025    DIABETIC EYE EXAM  04/08/2025     DXA SCAN  08/22/2025           Patient or patient representative verbalized consent for the use of Ambient Listening during the visit with  Aubree Alas MD for chart documentation. 6/10/2025  12:52 EDT

## 2025-06-11 ENCOUNTER — RESULTS FOLLOW-UP (OUTPATIENT)
Dept: FAMILY MEDICINE CLINIC | Facility: CLINIC | Age: 70
End: 2025-06-11
Payer: MEDICARE

## 2025-06-11 LAB
ALBUMIN UR-MCNC: <1.2 MG/DL
CREAT UR-MCNC: 42.2 MG/DL
MICROALBUMIN/CREAT UR: NORMAL MG/G{CREAT}

## 2025-06-11 NOTE — PROGRESS NOTES
Glucose was 155 but A1c shows good control at 5.7.  Vitamin B12 is low so increase to 1000 units a couple of days per week call if any other questions or concerns

## 2025-06-11 NOTE — TELEPHONE ENCOUNTER
Odalis Solo notified and voiced comprehension and understanding.    CBC Auto Differential; Hemoglobin A1c; Comprehensive Metabolic Panel; Magnesium; Lipid Panel (4 more orders)

## 2025-06-18 RX ORDER — ONDANSETRON 4 MG/1
4 TABLET, FILM COATED ORAL
Qty: 30 TABLET | Refills: 0 | OUTPATIENT
Start: 2025-06-18

## 2025-06-19 ENCOUNTER — TELEPHONE (OUTPATIENT)
Dept: FAMILY MEDICINE CLINIC | Facility: CLINIC | Age: 70
End: 2025-06-19
Payer: MEDICARE

## 2025-06-19 RX ORDER — ONDANSETRON 4 MG/1
4 TABLET, FILM COATED ORAL EVERY 8 HOURS PRN
Qty: 30 TABLET | Refills: 0 | Status: CANCELLED | OUTPATIENT
Start: 2025-06-19

## 2025-06-19 RX ORDER — ONDANSETRON 8 MG/1
8 TABLET, ORALLY DISINTEGRATING ORAL EVERY 8 HOURS PRN
Qty: 30 TABLET | Refills: 1 | Status: SHIPPED | OUTPATIENT
Start: 2025-06-19

## 2025-06-19 NOTE — TELEPHONE ENCOUNTER
Caller: Odalis Solo    Relationship to patient: Self    Best call back number: [phone]    Patient is needing:   PATIENT IS WANTING TO KNOW WHY THE REQUEST FOR THE ZOFRAN WAS DENIED. SHE STATES SHE REALLY NEEDS THAT TO HAVE ON HAND.    PLEASE CALL TO DISCUSS.

## 2025-06-19 NOTE — TELEPHONE ENCOUNTER
Please let patient know that the prescription has been refilled for 30 tablets with 1 refill.  If her symptoms worsen we should probably repeat her EGD as it has not been done since 2015.  Has her nausea worsened recently?  For some reason that prescription is not on your med list so that is probably why it was denied.  We have placed it back on your med list

## 2025-06-23 RX ORDER — FUROSEMIDE 40 MG/1
40 TABLET ORAL DAILY
Qty: 90 TABLET | Refills: 0 | Status: SHIPPED | OUTPATIENT
Start: 2025-06-23

## 2025-07-02 NOTE — TELEPHONE ENCOUNTER
Rx Refill Note  Requested Prescriptions     Pending Prescriptions Disp Refills   • Praluent 75 MG/ML solution auto-injector [Pharmacy Med Name: PRALUENT 75MG/ML PF PEN INJ 2X1ML] 2 mL 3     Sig: ADMINISTER 1 ML UNDER THE SKIN EVERY 14 DAYS AS DIRECTED      Last office visit with prescribing clinician: 6/10/2025   Last telemedicine visit with prescribing clinician: Visit date not found   Next office visit with prescribing clinician: 9/11/2025       Would you like a call back once the refill request has been completed: [] Yes [] No    If the office needs to give you a call back, can they leave a voicemail: [] Yes [] No    Erika Silva MA  07/02/25, 09:49 EDT

## 2025-07-03 RX ORDER — ALIROCUMAB 75 MG/ML
INJECTION, SOLUTION SUBCUTANEOUS
Qty: 2 ML | Refills: 3 | Status: SHIPPED | OUTPATIENT
Start: 2025-07-03

## 2025-07-15 ENCOUNTER — TELEPHONE (OUTPATIENT)
Dept: FAMILY MEDICINE CLINIC | Facility: CLINIC | Age: 70
End: 2025-07-15
Payer: MEDICARE

## 2025-07-15 NOTE — TELEPHONE ENCOUNTER
Please call the patient and see if she is agreeable to a low-dose statin along with her Praluent.  If she had a statin problem we should tamra her chart statin declined.

## 2025-07-15 NOTE — TELEPHONE ENCOUNTER
Caller: TEGAN    Relationship:     Best call back number: [phone]     What was the call regarding:   TEGAN RECOMMENDS THE PATIENT BE ON A STATIN MEDICATION THERAPY

## 2025-07-22 RX ORDER — DAPAGLIFLOZIN 5 MG/1
5 TABLET, FILM COATED ORAL DAILY
Qty: 30 TABLET | Refills: 1 | Status: SHIPPED | OUTPATIENT
Start: 2025-07-22

## 2025-08-01 RX ORDER — FLUOXETINE HYDROCHLORIDE 40 MG/1
40 CAPSULE ORAL DAILY
Qty: 90 CAPSULE | Refills: 0 | Status: SHIPPED | OUTPATIENT
Start: 2025-08-01

## 2025-08-07 ENCOUNTER — PRIOR AUTHORIZATION (OUTPATIENT)
Dept: FAMILY MEDICINE CLINIC | Facility: CLINIC | Age: 70
End: 2025-08-07
Payer: MEDICARE

## 2025-08-07 ENCOUNTER — EXTERNAL PBMM DATA (OUTPATIENT)
Dept: PHARMACY | Facility: OTHER | Age: 70
End: 2025-08-07
Payer: MEDICARE

## 2025-08-12 RX ORDER — BLOOD-GLUCOSE METER
1 EACH MISCELLANEOUS 2 TIMES DAILY
Qty: 1 KIT | Refills: 0 | OUTPATIENT
Start: 2025-08-12

## 2025-08-12 RX ORDER — LANCETS
1 EACH MISCELLANEOUS 2 TIMES DAILY
Qty: 100 EACH | Refills: 3 | OUTPATIENT
Start: 2025-08-12

## 2025-08-18 ENCOUNTER — TELEPHONE (OUTPATIENT)
Dept: FAMILY MEDICINE CLINIC | Facility: CLINIC | Age: 70
End: 2025-08-18
Payer: MEDICARE

## 2025-08-18 RX ORDER — GLUCOSAMINE HCL/CHONDROITIN SU 500-400 MG
1 CAPSULE ORAL
Qty: 100 EACH | Refills: 3 | Status: SHIPPED | OUTPATIENT
Start: 2025-08-18

## 2025-08-18 RX ORDER — BLOOD-GLUCOSE METER
1 EACH MISCELLANEOUS
Qty: 1 KIT | Refills: 0 | Status: SHIPPED | OUTPATIENT
Start: 2025-08-18

## 2025-08-18 RX ORDER — LANCETS
1 EACH MISCELLANEOUS
Qty: 102 EACH | Refills: 3 | Status: SHIPPED | OUTPATIENT
Start: 2025-08-18

## 2025-08-19 ENCOUNTER — OFFICE (AMBULATORY)
Dept: URBAN - METROPOLITAN AREA CLINIC 64 | Facility: CLINIC | Age: 70
End: 2025-08-19
Payer: MEDICARE

## 2025-08-19 VITALS
HEIGHT: 60 IN | HEART RATE: 75 BPM | WEIGHT: 156 LBS | SYSTOLIC BLOOD PRESSURE: 104 MMHG | DIASTOLIC BLOOD PRESSURE: 71 MMHG

## 2025-08-19 DIAGNOSIS — R10.13 EPIGASTRIC PAIN: ICD-10-CM

## 2025-08-19 DIAGNOSIS — R11.0 NAUSEA: ICD-10-CM

## 2025-08-19 DIAGNOSIS — R19.7 DIARRHEA, UNSPECIFIED: ICD-10-CM

## 2025-08-19 PROCEDURE — 99214 OFFICE O/P EST MOD 30 MIN: CPT | Performed by: NURSE PRACTITIONER

## 2025-08-19 RX ORDER — PROMETHAZINE HYDROCHLORIDE 25 MG/1
TABLET ORAL
Qty: 30 | Refills: 6 | Status: ACTIVE
Start: 2025-08-19

## 2025-08-21 ENCOUNTER — EXTERNAL PBMM DATA (OUTPATIENT)
Dept: PHARMACY | Facility: OTHER | Age: 70
End: 2025-08-21
Payer: MEDICARE

## 2025-08-22 ENCOUNTER — TELEPHONE (OUTPATIENT)
Dept: FAMILY MEDICINE CLINIC | Facility: CLINIC | Age: 70
End: 2025-08-22
Payer: MEDICARE

## 2025-08-25 ENCOUNTER — HOSPITAL ENCOUNTER (OUTPATIENT)
Dept: BONE DENSITY | Facility: HOSPITAL | Age: 70
Discharge: HOME | End: 2025-08-25
Admitting: PREVENTIVE MEDICINE
Payer: MEDICARE

## 2025-08-25 DIAGNOSIS — Z78.0 POST-MENOPAUSAL: ICD-10-CM

## 2025-08-25 PROCEDURE — 77080 DXA BONE DENSITY AXIAL: CPT

## (undated) DEVICE — PK ENDO GI 50

## (undated) DEVICE — STERILE PATIENT PROTECTIVE PAD FOR IMP® KNEE POSITIONERS & COHESIVE WRAP (10 / CASE): Brand: DE MAYO KNEE POSITIONER®

## (undated) DEVICE — GLV SURG SENSICARE PI ORTHO PF SZ7 LF STRL

## (undated) DEVICE — GOWN,SLEEVE,STERILE,W/CSR WRAP,1/P: Brand: MEDLINE

## (undated) DEVICE — TBG PENCL TELESCP MEGADYNE SMOKE EVAC 15FT

## (undated) DEVICE — SUT VIC 2/0 CT2 27IN J269H

## (undated) DEVICE — 3M™ STERI-STRIP™ REINFORCED ADHESIVE SKIN CLOSURES, R1547, 1/2 IN X 4 IN (12 MM X 100 MM), 6 STRIPS/ENVELOPE: Brand: 3M™ STERI-STRIP™

## (undated) DEVICE — CATH IV INSYTE AUTOGARD 14G 1 1/2IN ORNG

## (undated) DEVICE — SOL IRRIG NACL 1000ML

## (undated) DEVICE — NDL HYPO PRECISIONGLIDE REG 25G 1 1/2

## (undated) DEVICE — 450 ML BOTTLE OF 0.05% CHLORHEXIDINE GLUCONATE IN 99.95% STERILE WATER FOR IRRIGATION, USP AND APPLICATOR.: Brand: IRRISEPT ANTIMICROBIAL WOUND LAVAGE

## (undated) DEVICE — ZIP 24 SURGICAL SKIN CLOSURE DEVICE, PSA: Brand: ZIP 24 SURGICAL SKIN CLOSURE DEVICE

## (undated) DEVICE — SUTURELASSO CRV 25D LT

## (undated) DEVICE — PK BASIC SPINE 50

## (undated) DEVICE — TBG ARTHSCP DUALWAVE

## (undated) DEVICE — SUT MONOCRYL 4/0 PS2 27IN Y426H ETY426H

## (undated) DEVICE — DUAL CUT SAGITTAL BLADE

## (undated) DEVICE — DECANTER: Brand: UNBRANDED

## (undated) DEVICE — SOL IRRIG NACL 9PCT 1000ML BTL

## (undated) DEVICE — PICO 7 10CM X 30CM: Brand: PICO™ 7

## (undated) DEVICE — COVER,MAYO STAND,STERILE: Brand: MEDLINE

## (undated) DEVICE — PK TOTL KN 50

## (undated) DEVICE — SOL ISO/ALC RUB 70PCT 4OZ

## (undated) DEVICE — NEEDLE, QUINCKE, 20GX3.5": Brand: MEDLINE

## (undated) DEVICE — ZIPPERED TOGA, 2X LARGE: Brand: FLYTE

## (undated) DEVICE — BLD SHAVER EXCALIBUR CRV 4MM

## (undated) DEVICE — CUFF SCD HEMOFORCE SEQ CALF STD MD

## (undated) DEVICE — DISTRACT SCRW 12MM STRL

## (undated) DEVICE — GLV SURG TRIUMPH LT PF LTX 8 STRL

## (undated) DEVICE — SUT ETHIB 2 CV V37 MS/4 30IN MX69G

## (undated) DEVICE — GLV SURG SENSICARE PI ORTHO SZ8.5 LF STRL

## (undated) DEVICE — Device

## (undated) DEVICE — SYR LUERLOK 50ML

## (undated) DEVICE — GLV SURG SIGNATURE ESSENTIAL PF LTX SZ8

## (undated) DEVICE — COVER,C-ARM,41X74: Brand: MEDLINE

## (undated) DEVICE — GLV SURG SENSICARE PI ORTHO SZ8 LF STRL

## (undated) DEVICE — 3.0MM PRECISION NEURO (MATCH HEAD)

## (undated) DEVICE — CANN TRPL DAM 7X7MM

## (undated) DEVICE — SINGLE-USE BIOPSY FORCEPS: Brand: RADIAL JAW 4

## (undated) DEVICE — TBG ARTHSCP PT W CONN/REDUC 8FT

## (undated) DEVICE — APPL CHLORAPREP HI/LITE 26ML ORNG

## (undated) DEVICE — UNDRGLV SURG BIOGEL PUNCTUREINDICATION SZ7 PF STRL

## (undated) DEVICE — GLV SURG SIGNATURE ESSENTIAL PF LTX SZ8.5

## (undated) DEVICE — TUBING, SUCTION, 1/4" X 12', STRAIGHT: Brand: MEDLINE

## (undated) DEVICE — KT SURG TURNOVER 050

## (undated) DEVICE — CEMENT MIXING SYSTEM WITH FEMORAL BREAKWAY NOZZLE: Brand: REVOLUTION

## (undated) DEVICE — CODMAN® SURGICAL PATTIES 1/2" X 1/2" (1.27CM X 1.27CM): Brand: CODMAN®

## (undated) DEVICE — MARKR SKIN W/RULR

## (undated) DEVICE — SOL IRRIG H2O 1000ML STRL

## (undated) DEVICE — ORG INST STRIP/TS ADHS 2X10IN YEL STRL

## (undated) DEVICE — PENCL HND ROCKRSWTCH HOLSTR EZ CLEAN TP CRD 10FT

## (undated) DEVICE — CANN PASSPORT BUTN 8MM 3CM

## (undated) DEVICE — SPONGE: SPECIALTY CHERRY DISS XR 100/CS: Brand: MEDICAL ACTION INDUSTRIES

## (undated) DEVICE — CODMAN® BIPOLAR CORD DISPOSABLE: Brand: CODMAN®

## (undated) DEVICE — GLV SURG TRIUMPH GREEN W/ALOE PF LTX 7 STRL

## (undated) DEVICE — BITEBLOCK ENDO W/STRAP 60F A/ LF DISP

## (undated) DEVICE — UNDERGLV SURG BIOGEL INDICAT PI SZ8.5 BLU

## (undated) DEVICE — SOL IRRIG SOD CHL 0.9PCT 3000ML

## (undated) DEVICE — ELECTRD BLD EZ CLN MOD XLNG 2.75IN

## (undated) DEVICE — DRSNG WND BORDR/ADHS NONADHR/GZ LF 4X4IN STRL

## (undated) DEVICE — GLV SURG DERMASSURE GRN LF PF 8.5

## (undated) DEVICE — PK SHLDR ARTHROSCOPY 50

## (undated) DEVICE — SKIN AFFIX SURG ADHESIVE 72/CS 0.55ML: Brand: MEDLINE

## (undated) DEVICE — SUT ETHLN 2/0 PS 18IN 585H

## (undated) DEVICE — DRSNG SURESITE WNDW 4X4.5

## (undated) DEVICE — PK PROC TURNOVER

## (undated) DEVICE — CVR HNDL LT SURG ACCSSRY BLU STRL

## (undated) DEVICE — GLV SURG TRIUMPH LT PF LTX 6.5 STRL

## (undated) DEVICE — SYR LL TP 10ML STRL

## (undated) DEVICE — DISPOSABLE TOURNIQUET CUFF 30"X4", 1-LINE, WHITE, STERILE, 1EA/PK, 10PK/CS: Brand: ASP MEDICAL

## (undated) DEVICE — CUFF TOURNI 1BLADDER 1PRT 30IN STRL

## (undated) DEVICE — C-ARM: Brand: UNBRANDED

## (undated) DEVICE — SPNG GZ AVANT 6PLY 4X4IN STRL PK/2

## (undated) DEVICE — DRSNG TELFA PAD NONADH STR 1S 3X8IN

## (undated) DEVICE — DRSNG BURN ACTICOAT FLEX 7 1X24IN

## (undated) DEVICE — SPNG GZ WOVN 4X4IN 12PLY 10/BX STRL

## (undated) DEVICE — ADHS LIQ MASTISOL 2/3ML

## (undated) DEVICE — WRAP KNEE COLD THERAPY

## (undated) DEVICE — TP NDL SCORPION MULTIFIRE

## (undated) DEVICE — 3M™ IOBAN™ 2 ANTIMICROBIAL INCISE DRAPE 6650EZ: Brand: IOBAN™ 2

## (undated) DEVICE — SUTURELASSO CRV 25D RT

## (undated) DEVICE — UNDYED BRAIDED (POLYGLACTIN 910), SYNTHETIC ABSORBABLE SUTURE: Brand: COATED VICRYL

## (undated) DEVICE — ADHS SKIN PREMIERPRO EXOFIN TOPICAL HI/VISC .5ML

## (undated) DEVICE — ANTIBACTERIAL UNDYED BRAIDED (POLYGLACTIN 910), SYNTHETIC ABSORBABLE SUTURE: Brand: COATED VICRYL

## (undated) DEVICE — SPNG LAP PREWSH SFTPK 18X18IN STRL PK/5

## (undated) DEVICE — SUT PDS 0 CT 36IN DYED Z358T

## (undated) DEVICE — TBG ARTHSCP PUMP W CONN/REDUC 8FT

## (undated) DEVICE — PAD,ABDOMINAL,5"X9",STERILE,LF,1/PK: Brand: MEDLINE INDUSTRIES, INC.

## (undated) DEVICE — GLV SURG TRIUMPH GREEN W/ALOE PF LTX 8.5 STRL

## (undated) DEVICE — ABL ASP APOLLO RF XL 90D